# Patient Record
Sex: MALE | Race: BLACK OR AFRICAN AMERICAN
[De-identification: names, ages, dates, MRNs, and addresses within clinical notes are randomized per-mention and may not be internally consistent; named-entity substitution may affect disease eponyms.]

---

## 2017-01-01 ENCOUNTER — HOSPITAL ENCOUNTER (EMERGENCY)
Dept: HOSPITAL 61 - ER | Age: 37
Discharge: HOME | End: 2017-01-01
Payer: COMMERCIAL

## 2017-01-01 VITALS — WEIGHT: 315 LBS | HEIGHT: 74 IN | BODY MASS INDEX: 40.43 KG/M2

## 2017-01-01 VITALS — SYSTOLIC BLOOD PRESSURE: 142 MMHG | DIASTOLIC BLOOD PRESSURE: 85 MMHG

## 2017-01-01 DIAGNOSIS — R09.89: ICD-10-CM

## 2017-01-01 DIAGNOSIS — E11.65: Primary | ICD-10-CM

## 2017-01-01 DIAGNOSIS — Z79.4: ICD-10-CM

## 2017-01-01 DIAGNOSIS — E11.40: ICD-10-CM

## 2017-01-01 DIAGNOSIS — Z91.048: ICD-10-CM

## 2017-01-01 DIAGNOSIS — E66.01: ICD-10-CM

## 2017-01-01 DIAGNOSIS — I10: ICD-10-CM

## 2017-01-01 DIAGNOSIS — Z88.2: ICD-10-CM

## 2017-01-01 DIAGNOSIS — F12.10: ICD-10-CM

## 2017-01-01 LAB
ALBUMIN SERPL-MCNC: 3.4 G/DL (ref 3.4–5)
ALBUMIN/GLOB SERPL: 0.8 {RATIO} (ref 1–1.7)
ALP SERPL-CCNC: 87 U/L (ref 46–116)
ALT SERPL-CCNC: 21 U/L (ref 16–63)
ANION GAP SERPL CALC-SCNC: 7 MMOL/L (ref 6–14)
AST SERPL-CCNC: 8 U/L (ref 15–37)
BACTERIA #/AREA URNS HPF: (no result) /HPF
BASOPHILS # BLD AUTO: 0.1 X10^3/UL (ref 0–0.2)
BASOPHILS NFR BLD: 1 % (ref 0–3)
BILIRUB SERPL-MCNC: 0.2 MG/DL (ref 0.2–1)
BILIRUB UR QL STRIP: NEGATIVE
BUN SERPL-MCNC: 17 MG/DL (ref 8–26)
BUN/CREAT SERPL: 15 (ref 6–20)
CALCIUM SERPL-MCNC: 8.8 MG/DL (ref 8.5–10.1)
CHLORIDE SERPL-SCNC: 99 MMOL/L (ref 98–107)
CO2 SERPL-SCNC: 29 MMOL/L (ref 21–32)
CREAT SERPL-MCNC: 1.1 MG/DL (ref 0.7–1.3)
EOSINOPHIL NFR BLD: 3 % (ref 0–3)
ERYTHROCYTE [DISTWIDTH] IN BLOOD BY AUTOMATED COUNT: 14.2 % (ref 11.5–14.5)
GFR SERPLBLD BASED ON 1.73 SQ M-ARVRAT: 90.6 ML/MIN
GLOBULIN SER-MCNC: 4.5 G/DL (ref 2.2–3.8)
GLUCOSE SERPL-MCNC: 338 MG/DL (ref 70–99)
GLUCOSE UR STRIP-MCNC: >=1000 MG/DL
HCT VFR BLD CALC: 41.1 % (ref 39–53)
HGB BLD-MCNC: 13.1 G/DL (ref 13–17.5)
LYMPHOCYTES # BLD: 2 X10^3/UL (ref 1–4.8)
LYMPHOCYTES NFR BLD AUTO: 20 % (ref 24–48)
MCH RBC QN AUTO: 25 PG (ref 25–35)
MCHC RBC AUTO-ENTMCNC: 32 G/DL (ref 31–37)
MCV RBC AUTO: 77 FL (ref 79–100)
MONOCYTES NFR BLD: 8 % (ref 0–9)
NEUTROPHILS NFR BLD AUTO: 68 % (ref 31–73)
NITRITE UR QL STRIP: NEGATIVE
PH UR STRIP: 5.5 [PH]
PLATELET # BLD AUTO: 295 X10^3/UL (ref 140–400)
POTASSIUM SERPL-SCNC: 4 MMOL/L (ref 3.5–5.1)
PROT SERPL-MCNC: 7.9 G/DL (ref 6.4–8.2)
PROT UR STRIP-MCNC: NEGATIVE MG/DL
RBC # BLD AUTO: 5.31 X10^6/UL (ref 4.3–5.7)
RBC #/AREA URNS HPF: 0 /HPF (ref 0–2)
SODIUM SERPL-SCNC: 135 MMOL/L (ref 136–145)
SP GR UR STRIP: 1.02
SQUAMOUS #/AREA URNS LPF: (no result) /LPF
UROBILINOGEN UR-MCNC: 0.2 MG/DL
WBC # BLD AUTO: 10 X10^3/UL (ref 4–11)
WBC #/AREA URNS HPF: (no result) /HPF (ref 0–4)

## 2017-01-01 PROCEDURE — 82947 ASSAY GLUCOSE BLOOD QUANT: CPT

## 2017-01-01 PROCEDURE — 71020: CPT

## 2017-01-01 PROCEDURE — 80053 COMPREHEN METABOLIC PANEL: CPT

## 2017-01-01 PROCEDURE — 96360 HYDRATION IV INFUSION INIT: CPT

## 2017-01-01 PROCEDURE — 36415 COLL VENOUS BLD VENIPUNCTURE: CPT

## 2017-01-01 PROCEDURE — 81001 URINALYSIS AUTO W/SCOPE: CPT

## 2017-01-01 PROCEDURE — 96361 HYDRATE IV INFUSION ADD-ON: CPT

## 2017-01-01 PROCEDURE — 85027 COMPLETE CBC AUTOMATED: CPT

## 2017-01-01 PROCEDURE — 99285 EMERGENCY DEPT VISIT HI MDM: CPT

## 2017-01-01 NOTE — RAD
Indication: Hyperglycemia and short of air today. Hypertension.



Technique: Two-view chest radiograph was obtained.  Comparison is from October 2, 2012.



Findings: The lungs are clear.  The cardiopulmonary silhouette is within

normal limits.  There is no pleural effusion.  The bony structures are intact.

Leads overlie the patient.



Impression: 

No acute thoracic findings.

## 2017-01-01 NOTE — PHYS DOC
Past Medical History


Past Medical History:  Diabetes-Type II, Hypertension


Additional Past Medical Histor:  Morbid obesity, neuropathy


Past Surgical History:  No Surgical History


Alcohol Use:  Occasionally


Drug Use:  Marijuana





Adult General


Chief Complaint


Chief Complaint:  HYPERGLYCEMIA





HPI


HPI


Patient is a 36  year old female who presents with hyperglycemia. He reports a 

past 3 days his blood sugars been reading high on his home glucometer. He also 

reports polyuria, polydipsia, increased fatigue. He says he feels dehydrated. 

He has been taking his insulin as prescribed; he says he takes 30 units novolog 

3 times a day. Diet has been typical with no significant change. Only other 

acute complaint is runny nose.





Review of Systems


Review of Systems


Constitutional: Fatigue. Denies fever or chills 


Eyes: Denies change in visual acuity or eye pain 


HENT: Rhinorrhea. Denies sore throat 


Respiratory: Denies cough or shortness of breath 


Cardiovascular: Denies chest pain


GI: Denies abdominal pain, nausea, vomiting, bloody stools or diarrhea 


: Denies dysuria or hematuria 


Musculoskeletal: Denies back pain or joint pain 


Endocrine: Polyuria, polydipsia


Neurologic: Denies headache, focal weakness or sensory changes





Current Medications


Current Medications





 Current Medications








 Medications


  (Trade)  Dose


 Ordered  Sig/Pratik  Start Time


 Stop Time Status Last Admin


Dose Admin


 


 Lactated Ringer's


  (Iv Lactated


 Ringers)  1,000 ml @ 


 1,000 mls/hr  1X  ONCE  1/1/17 13:45


 1/1/17 14:44 DC 1/1/17 14:19


1,000 MLS/HR


 


 Sodium Chloride  1,000 ml @ 


 1,000 mls/hr  Q1H  1/1/17 11:46


 1/1/17 12:45 DC 1/1/17 12:39


1,000 MLS/HR











Allergies


Allergies





 Allergies








Coded Allergies Type Severity Reaction Last Updated Verified


 


  Sulfa (Sulfonamide Antibiotics) Allergy Intermediate  3/30/16 Yes


 


  adhesive tape Allergy Intermediate skin sensitive to tape 3/30/16 Yes











Physical Exam


Physical Exam


Constitutional: Well developed, well nourished, no acute distress, non-toxic 

appearance 


HENT: Normocephalic, atraumatic, bilateral external ears normal


Eyes: PERRL, EOMI, conjunctiva normal, no discharge


Neck: Normal range of motion, no stridor


Cardiovascular: Heart rate normal, regular rhythm, no murmur 


Lungs & Thorax:  Bilateral breath sounds clear to auscultation


Abdomen: Obese, bowel sounds normal, soft, non-distended, no TTP


Skin: Warm, dry, no erythema, no rash


Extremities: No obvious deformity, no edema


Neurologic: Somnolent but easily aroused, oriented X 3, no gross deficits noted





Current Patient Data


Vital Signs





 Vital Signs








  Date Time  Temp Pulse Resp B/P Pulse Ox O2 Delivery O2 Flow Rate FiO2


 


1/1/17 15:53  89 28 142/85 98 Room Air  


 


1/1/17 11:10 97.6       





 97.6       








Lab Values





 Laboratory Tests








Test


  1/1/17


11:25 1/1/17


11:37 1/1/17


13:39 1/1/17


14:20


 


White Blood Count


  10.0x10^3/uL


(4.0-11.0) 


  


  


 


 


Red Blood Count


  5.31x10^6/uL


(4.30-5.70) 


  


  


 


 


Hemoglobin


  13.1g/dL


(13.0-17.5) 


  


  


 


 


Hematocrit


  41.1%


(39.0-53.0) 


  


  


 


 


Mean Corpuscular Volume


  77fL ()


L 


  


  


 


 


Mean Corpuscular Hemoglobin 25pg (25-35)     


 


Mean Corpuscular Hemoglobin


Concent 32g/dL (31-37)


  


  


  


 


 


Red Cell Distribution Width


  14.2%


(11.5-14.5) 


  


  


 


 


Platelet Count


  295x10^3/uL


(140-400) 


  


  


 


 


Neutrophils (%) (Auto) 68% (31-73)     


 


Lymphocytes (%) (Auto) 20% (24-48)  L   


 


Monocytes (%) (Auto) 8% (0-9)     


 


Eosinophils (%) (Auto) 3% (0-3)     


 


Basophils (%) (Auto) 1% (0-3)     


 


Neutrophils # (Auto)


  6.8x10^3uL


(1.8-7.7) 


  


  


 


 


Lymphocytes # (Auto)


  2.0x10^3/uL


(1.0-4.8) 


  


  


 


 


Monocytes # (Auto)


  0.8x10^3/uL


(0.0-1.1) 


  


  


 


 


Eosinophils # (Auto)


  0.3x10^3/uL


(0.0-0.7) 


  


  


 


 


Basophils # (Auto)


  0.1x10^3/uL


(0.0-0.2) 


  


  


 


 


Sodium Level


  135mmol/L


(136-145)  L 


  


  


 


 


Potassium Level


  4.0mmol/L


(3.5-5.1) 


  


  


 


 


Chloride Level


  99mmol/L


() 


  


  


 


 


Carbon Dioxide Level


  29mmol/L


(21-32) 


  


  


 


 


Anion Gap 7 (6-14)     


 


Blood Urea Nitrogen


  17mg/dL (8-26)


  


  


  


 


 


Creatinine


  1.1mg/dL


(0.7-1.3) 


  


  


 


 


Estimated GFR


(Cockcroft-Gault) 90.6  


  


  


  


 


 


BUN/Creatinine Ratio 15 (6-20)     


 


Glucose Level


  338mg/dL


(70-99)  H 


  


  


 


 


Calcium Level


  8.8mg/dL


(8.5-10.1) 


  


  


 


 


Total Bilirubin


  0.2mg/dL


(0.2-1.0) 


  


  


 


 


Aspartate Amino Transferase


(AST) 8U/L (15-37)  L


  


  


  


 


 


Alanine Aminotransferase (ALT) 21U/L (16-63)     


 


Alkaline Phosphatase


  87U/L ()


  


  


  


 


 


Total Protein


  7.9g/dL


(6.4-8.2) 


  


  


 


 


Albumin


  3.4g/dL


(3.4-5.0) 


  


  


 


 


Albumin/Globulin Ratio


  0.8 (1.0-1.7)


L 


  


  


 


 


Glucose (Fingerstick)


  


  339mg/dL


(70-99)  H 154mg/dL


(70-99)  H 


 


 


Urine Collection Type    Unknown  


 


Urine Color    Yellow  


 


Urine Clarity    Clear  


 


Urine pH    5.5  


 


Urine Specific Gravity    1.025  


 


Urine Protein


  


  


  


  Negativemg/dL


(NEG-TRACE)


 


Urine Glucose (UA)


  


  


  


  >=1000mg/dL


(NEG)


 


Urine Ketones (Stick)


  


  


  


  Negativemg/dL


(NEG)


 


Urine Blood


  


  


  


  Negative (NEG)


 


 


Urine Nitrite


  


  


  


  Negative (NEG)


 


 


Urine Bilirubin


  


  


  


  Negative (NEG)


 


 


Urine Urobilinogen Dipstick


  


  


  


  0.2mg/dL (0.2


mg/dL)


 


Urine Leukocyte Esterase


  


  


  


  Negative (NEG)


 


 


Urine RBC    0/HPF (0-2)  


 


Urine WBC    1-4/HPF (0-4)  


 


Urine Squamous Epithelial


Cells 


  


  


  Few/LPF  


 


 


Urine Bacteria


  


  


  


  Few/HPF


(0-FEW)


 


Urine Mucus    Slight/LPF  














Test


  1/1/17


15:45 


  


  


 


 


Glucose (Fingerstick)


  112mg/dL


(70-99)  H 


  


  


 





 Laboratory Tests


1/1/17 11:25








 Laboratory Tests


1/1/17 11:25














EKG


EKG


[]





Radiology/Procedures


Radiology/Procedures


CXR:


Impression: 


No acute thoracic findings.





Course & Med Decision Making


Course & Med Decision Making


Pertinent Labs and Imaging studies reviewed. (See chart for details)


Patient is 36-year-old male who presents with hyperglycemia. Had been given 50 

units of NovoLog better prior to coming in, blood glucose level down to 300s on 

arrival to the ED. Will check labs to rule out serious complication such as 

DKA. IV fluid bolus ordered. Labs notable for for blood glucose level greater 

than 300. After fluid bolus, this had gone down to 154. Additional fluids 

ordered. Patient in ED to monitor blood sugar make sure that it did not drop to 

dangerous levels. Repeat fingerstick after several hours in ED was 112. 

Discussed results with patient. Discussed importance of following up with PCP 

for possible adjustment of diabetes meds; also discussed return precautions. 

Patient discharged home.





Dragon Disclaimer


Dragon Disclaimer


This electronic medical record was generated, in whole or in part, using a 

voice recognition dictation system.





Departure


Departure


Impression:  


 Primary Impression:  


 Hyperglycemia


Disposition:  01 HOME, SELF-CARE


Condition:  IMPROVED


Referrals:  


JAROD PATINO MD (PCP)


Patient Instructions:  Hyperglycemia





Additional Instructions:


Thank you for allowing us to provide care today in the Emergency Department.


Schedule a follow up appointment with your primary care doctor. You may need 

your diabetes medications adjusted.


Return promptly to the Emergency Department if you develop any new or 

concerning symptoms.








ROSE MARIE HARMON MD Jan 1, 2017 11:49

## 2017-05-13 ENCOUNTER — HOSPITAL ENCOUNTER (EMERGENCY)
Dept: HOSPITAL 61 - ER | Age: 37
Discharge: HOME | End: 2017-05-13
Payer: COMMERCIAL

## 2017-05-13 VITALS — SYSTOLIC BLOOD PRESSURE: 166 MMHG | DIASTOLIC BLOOD PRESSURE: 87 MMHG

## 2017-05-13 DIAGNOSIS — E66.9: ICD-10-CM

## 2017-05-13 DIAGNOSIS — F12.10: ICD-10-CM

## 2017-05-13 DIAGNOSIS — E11.40: ICD-10-CM

## 2017-05-13 DIAGNOSIS — J18.9: Primary | ICD-10-CM

## 2017-05-13 DIAGNOSIS — Z88.8: ICD-10-CM

## 2017-05-13 DIAGNOSIS — Z88.2: ICD-10-CM

## 2017-05-13 DIAGNOSIS — J45.909: ICD-10-CM

## 2017-05-13 DIAGNOSIS — I10: ICD-10-CM

## 2017-05-13 PROCEDURE — 99284 EMERGENCY DEPT VISIT MOD MDM: CPT

## 2017-05-13 PROCEDURE — 96372 THER/PROPH/DIAG INJ SC/IM: CPT

## 2017-05-13 PROCEDURE — 71020: CPT

## 2017-05-13 NOTE — PHYS DOC
Past Medical History


Past Medical History:  Diabetes-Type II, Hypertension


Additional Past Medical Histor:  Morbid obesity, neuropathy


Past Surgical History:  No Surgical History


Alcohol Use:  Occasionally


Drug Use:  Marijuana





Adult General


Chief Complaint


Chief Complaint:  ASTHMA





HPI


HPI





Patient is a 36  year old gentleman who presents with a history of productive 

cough for the past 4-5 days with shortness of air. Patient has a history of 

asthma and has been doing breathing treatments at home. Patient denies smoking. 

Patient denies any fevers. Patient denies any chest pain. Patient is diabetic 

and severe obese. Patient has no other complaints. Patient denies any cardiac 

history or any history of PE.





Pertinent exam findings:


Tachycardia


Lungs: CTAB





ED course:


1627: CXR ordered


1800: Patient reevaluated who is feeling much better and is ready to go home. 

Explained chest x-ray results the patient may need placement on antibiotics and 

steroids. Patient states he'll follow up was recently in 1-2 days





Pertinent findings:


Two-view chest x-ray shows left lower lobe infiltrates





ED decision-making:


After reviewing the chart, CC/HPI/PMH, PE, chest x-ray results I do not believe 

the patient is having a severe respiratory infection warranting further workup 

and admission at this time. Low suspicion for an acute PE with the well's score 

of 1.5.  On re-examination patient states he feels better and desired to go 

home. I believe the patient is stable for discharge with oral antibiotics.  

Additional verbal discharge instructions were provided to the patient and that 

if symptoms get worse or any new symptoms arise or worsen the patient is to 

returned emergency room immediately.





Review of Systems


Review of Systems





Constitutional: Denies fever or chills []


Eyes: Denies change in visual acuity, redness, or eye pain []


HENT: Denies nasal congestion or sore throat []


Respiratory: SOA, cough


Cardiovascular: No additional information not addressed in HPI []


GI: Denies abdominal pain, nausea, vomiting, bloody stools or diarrhea []


: Denies dysuria or hematuria []


Musculoskeletal: Denies back pain or joint pain []


Integument: Denies rash or skin lesions []


Neurologic: Denies headache, focal weakness or sensory changes []


Endocrine: Denies polyuria or polydipsia []





Current Medications


Current Medications





Current Medications








 Medications


  (Trade)  Dose


 Ordered  Sig/Pratik  Start Time


 Stop Time Status Last Admin


Dose Admin


 


 Dexamethasone


 Sodium Phosphate


  (Decadron)  10 mg  1X  ONCE  5/13/17 16:30


 5/13/17 16:31 DC 5/13/17 16:30


10 MG











Allergies


Allergies





Allergies








Coded Allergies Type Severity Reaction Last Updated Verified


 


  Sulfa (Sulfonamide Antibiotics) Allergy Intermediate  3/30/16 Yes


 


  adhesive tape Allergy Intermediate skin sensitive to tape 3/30/16 Yes











Physical Exam


Physical Exam





Constitutional: Well developed, well nourished, no acute distress, non-toxic 

appearance. []


HENT: Normocephalic, atraumatic, bilateral external ears normal, oropharynx 

moist, no oral exudates, nose normal. []


Eyes: PERRLA, EOMI, conjunctiva normal, no discharge. [] 


Neck: Normal range of motion, no tenderness, supple, no stridor. [] 


Cardiovascular:Tachy, regular rhythm, no murmur []


Lungs & Thorax:  Bilateral breath sounds clear to auscultation []


Abdomen: Bowel sounds normal, soft, no tenderness, no masses, no pulsatile 

masses. [] 


Skin: Warm, dry, no erythema, no rash. [] 


Back: No tenderness, no CVA tenderness. [] 


Extremities: No tenderness, no cyanosis, no clubbing, ROM intact, no edema. [] 


Neurologic: Alert and oriented X 3, normal motor function, normal sensory 

function, no focal deficits noted. []


Psychologic: Affect normal, judgement normal, mood normal. []





Current Patient Data


Vital Signs





 Vital Signs








  Date Time  Temp Pulse Resp B/P (MAP) Pulse Ox O2 Delivery O2 Flow Rate FiO2


 


5/13/17 16:27 98.7 99 20  95 Room Air  





 98.7       











EKG


EKG


[]





Radiology/Procedures


Radiology/Procedures


Two-view chest x-ray 


Infiltrates left lower lobe []





Course & Med Decision Making


Course & Med Decision Making


Pertinent Labs and Imaging studies reviewed. (See chart for details)





[]





Dragon Disclaimer


Dragon Disclaimer


This electronic medical record was generated, in whole or in part, using a 

voice recognition dictation system.





Departure


Departure


Impression:  


 Primary Impression:  


 Left lower lobe pneumonia


Disposition:  01 HOME, SELF-CARE


Condition:  IMPROVED


Referrals:  


UNKNOWN PCP NAME (PCP)


Patient Instructions:  Pneumonia, Adult


Scripts


Azithromycin (ZITHROMAX PACKET) 1 Gm Packet


1 PACKET PO ONCE, #1 PACKET


   Prov: BINU CHO DO         5/13/17 


Prednisone (PREDNISONE) 50 Mg Tablet


1 TAB PO DAILY, #5 TAB


   Prov: BINU CHO DO         5/13/17





Problem Qualifiers








 Primary Impression:  


 Left lower lobe pneumonia


 Pneumonia type:  due to unspecified organism  Qualified Codes:  J18.1 - Lobar 

pneumonia, unspecified organism








BINU CHO DO May 13, 2017 16:29

## 2017-05-14 NOTE — RAD
EXAM: CHEST 2 VIEWS 



History: Shortness of breath



COMPARISON: 1/1/2017



TECHNIQUE: PA and lateral chest radiographs



FINDINGS:  The cardiomediastinal silhouette is within normal limits. The lungs

are clear bilaterally. The costophrenic sulci are clear and well demarcated

bilaterally. 



IMPRESSION:  No radiographic evidence of an acute cardiopulmonary abnormality.

## 2018-06-05 ENCOUNTER — HOSPITAL ENCOUNTER (EMERGENCY)
Dept: HOSPITAL 61 - ER | Age: 38
LOS: 1 days | Discharge: ANOTHER HOSPITAL | End: 2018-06-06
Payer: COMMERCIAL
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
## 2018-06-05 DIAGNOSIS — J45.909: ICD-10-CM

## 2018-06-05 DIAGNOSIS — R07.89: ICD-10-CM

## 2018-06-05 DIAGNOSIS — Z88.8: ICD-10-CM

## 2018-06-05 DIAGNOSIS — Z88.2: ICD-10-CM

## 2018-06-05 DIAGNOSIS — E66.01: ICD-10-CM

## 2018-06-05 DIAGNOSIS — E11.40: ICD-10-CM
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
## 2018-06-05 DIAGNOSIS — M79.671: ICD-10-CM

## 2018-06-05 DIAGNOSIS — I10: ICD-10-CM

## 2018-06-05 DIAGNOSIS — K80.20: Primary | ICD-10-CM

## 2018-06-05 LAB
ADD MAN DIFF?: NO
ALBUMIN SERPL-MCNC: 3.3 G/DL (ref 3.4–5)
ALBUMIN/GLOB SERPL: 0.8 {RATIO} (ref 1–1.7)
ALP SERPL-CCNC: 97 U/L (ref 46–116)
ALT (SGPT): 24 U/L (ref 16–63)
ANION GAP SERPL CALC-SCNC: 10 MMOL/L (ref 6–14)
AST SERPL-CCNC: 10 U/L (ref 15–37)
BASO #: 0.1 X10^3/UL (ref 0–0.2)
BASO %: 1 % (ref 0–3)
BLOOD UREA NITROGEN: 13 MG/DL (ref 8–26)
BUN/CREAT SERPL: 13 (ref 6–20)
CALCIUM: 8.2 MG/DL (ref 8.5–10.1)
CHLORIDE: 100 MMOL/L (ref 98–107)
CK SERPL-CCNC: 87 U/L (ref 39–308)
CK SERPL-CCNC: 92 U/L (ref 39–308)
CKMB INDEX: 1 % (ref 0–4)
CKMB MASS: 0.9 NG/ML (ref 0–3.6)
CO2 SERPL-SCNC: 27 MMOL/L (ref 21–32)
CREAT SERPL-MCNC: 1 MG/DL (ref 0.7–1.3)
EOS #: 0.4 X10^3/UL (ref 0–0.7)
EOS %: 3 % (ref 0–3)
GFR SERPLBLD BASED ON 1.73 SQ M-ARVRAT: 100.7 ML/MIN
GLOBULIN SER-MCNC: 4.3 G/DL (ref 2.2–3.8)
GLUCOSE SERPL-MCNC: 305 MG/DL (ref 70–99)
HCG SERPL-ACNC: 12.1 X10^3/UL (ref 4–11)
HEMATOCRIT: 41.8 % (ref 39–53)
HEMOGLOBIN: 13.8 G/DL (ref 13–17.5)
INR: 1 (ref 0.8–1.1)
LIPASE: 43 U/L (ref 73–393)
LYMPH #: 2.1 X10^3/UL (ref 1–4.8)
LYMPH %: 17 % (ref 24–48)
MEAN CORPUSCULAR HEMOGLOBIN: 26 PG (ref 25–35)
MEAN CORPUSCULAR HGB CONC: 33 G/DL (ref 31–37)
MEAN CORPUSCULAR VOLUME: 78 FL (ref 79–100)
MONO #: 1 X10^3/UL (ref 0–1.1)
MONO %: 8 % (ref 0–9)
NEUT #: 8.6 X10^3UL (ref 1.8–7.7)
NEUT %: 71 % (ref 31–73)
PLATELET COUNT: 339 X10^3/UL (ref 140–400)
POTASSIUM SERPL-SCNC: 4.2 MMOL/L (ref 3.5–5.1)
PROTHROMBIN TIME PATIENT: 12.3 SEC (ref 11.7–14)
RED BLOOD COUNT: 5.34 X10^6/UL (ref 4.3–5.7)
RED CELL DISTRIBUTION WIDTH: 15.2 % (ref 11.5–14.5)
SODIUM: 137 MMOL/L (ref 136–145)
TOTAL BILIRUBIN: 0.4 MG/DL (ref 0.2–1)
TOTAL PROTEIN: 7.6 G/DL (ref 6.4–8.2)
TROPONINI: < 0.017 NG/ML (ref 0–0.06)

## 2018-06-05 PROCEDURE — 83690 ASSAY OF LIPASE: CPT

## 2018-06-05 PROCEDURE — 71260 CT THORAX DX C+: CPT

## 2018-06-05 PROCEDURE — 73630 X-RAY EXAM OF FOOT: CPT

## 2018-06-05 PROCEDURE — 93005 ELECTROCARDIOGRAM TRACING: CPT

## 2018-06-05 PROCEDURE — 85610 PROTHROMBIN TIME: CPT

## 2018-06-05 PROCEDURE — 85025 COMPLETE CBC W/AUTO DIFF WBC: CPT

## 2018-06-05 PROCEDURE — 80053 COMPREHEN METABOLIC PANEL: CPT

## 2018-06-05 PROCEDURE — 36415 COLL VENOUS BLD VENIPUNCTURE: CPT

## 2018-06-05 PROCEDURE — 71045 X-RAY EXAM CHEST 1 VIEW: CPT

## 2018-06-05 PROCEDURE — 82553 CREATINE MB FRACTION: CPT

## 2018-06-05 PROCEDURE — 74177 CT ABD & PELVIS W/CONTRAST: CPT

## 2018-06-05 PROCEDURE — 96375 TX/PRO/DX INJ NEW DRUG ADDON: CPT

## 2018-06-05 PROCEDURE — 84484 ASSAY OF TROPONIN QUANT: CPT

## 2018-06-05 PROCEDURE — 96365 THER/PROPH/DIAG IV INF INIT: CPT

## 2018-06-05 PROCEDURE — 82550 ASSAY OF CK (CPK): CPT

## 2018-06-05 PROCEDURE — 99285 EMERGENCY DEPT VISIT HI MDM: CPT

## 2018-06-05 PROCEDURE — 96368 THER/DIAG CONCURRENT INF: CPT

## 2018-06-05 RX ADMIN — ONDANSETRON 1 MG: 2 INJECTION INTRAMUSCULAR; INTRAVENOUS at 22:28

## 2018-06-05 RX ADMIN — FENTANYL CITRATE 1 MCG: 50 INJECTION INTRAMUSCULAR; INTRAVENOUS at 22:27

## 2018-06-05 RX ADMIN — IOHEXOL 1 ML: 300 INJECTION, SOLUTION INTRAVENOUS at 22:30

## 2018-06-06 RX ADMIN — BACITRACIN 1 MLS/HR: 5000 INJECTION, POWDER, FOR SOLUTION INTRAMUSCULAR at 00:52

## 2018-06-06 RX ADMIN — MORPHINE SULFATE 1 MG: 4 INJECTION, SOLUTION INTRAMUSCULAR; INTRAVENOUS at 00:51

## 2018-07-02 ENCOUNTER — HOSPITAL ENCOUNTER (OUTPATIENT)
Dept: HOSPITAL 61 - SLPLAB | Age: 38
Discharge: HOME | End: 2018-07-02
Attending: NURSE PRACTITIONER
Payer: COMMERCIAL

## 2018-07-02 DIAGNOSIS — I10: ICD-10-CM

## 2018-07-02 DIAGNOSIS — G47.61: ICD-10-CM

## 2018-07-02 DIAGNOSIS — E78.00: ICD-10-CM

## 2018-07-02 DIAGNOSIS — E11.65: ICD-10-CM

## 2018-07-02 DIAGNOSIS — G47.33: Primary | ICD-10-CM

## 2018-07-02 PROCEDURE — 95810 POLYSOM 6/> YRS 4/> PARAM: CPT

## 2018-08-01 ENCOUNTER — HOSPITAL ENCOUNTER (EMERGENCY)
Dept: HOSPITAL 61 - ER | Age: 38
Discharge: HOME | End: 2018-08-01
Payer: COMMERCIAL

## 2018-08-01 DIAGNOSIS — E11.40: ICD-10-CM

## 2018-08-01 DIAGNOSIS — E78.00: ICD-10-CM

## 2018-08-01 DIAGNOSIS — M79.605: ICD-10-CM

## 2018-08-01 DIAGNOSIS — J45.909: ICD-10-CM

## 2018-08-01 DIAGNOSIS — M54.5: Primary | ICD-10-CM

## 2018-08-01 LAB
AMORPHOUS SEDIMENT,UR: PRESENT /HPF
BACTERIA,URINE: (no result) /HPF
BILIRUBIN,URINE: NEGATIVE
CLARITY,URINE: CLEAR
COLOR,URINE: YELLOW
GLUCOSE,URINE: 100 MG/DL
NITRITE,URINE: NEGATIVE
PH,URINE: 5.5
PROTEIN,URINE: 30 MG/DL
RBC,URINE: 0 /HPF (ref 0–2)
SPECIFIC GRAVITY,URINE: >=1.03
SQUAMOUS EPITHELIAL CELL,UR: (no result) /LPF
UROBILINOGEN,URINE: 1 MG/DL

## 2018-08-01 PROCEDURE — 99284 EMERGENCY DEPT VISIT MOD MDM: CPT

## 2018-08-01 PROCEDURE — 87086 URINE CULTURE/COLONY COUNT: CPT

## 2018-08-01 PROCEDURE — 81001 URINALYSIS AUTO W/SCOPE: CPT

## 2018-08-01 PROCEDURE — 96372 THER/PROPH/DIAG INJ SC/IM: CPT

## 2018-08-01 RX ADMIN — KETOROLAC TROMETHAMINE 1 MG: 30 INJECTION, SOLUTION INTRAMUSCULAR at 03:54

## 2018-08-01 RX ADMIN — LIDOCAINE 1 PATCH: 50 PATCH CUTANEOUS at 03:53

## 2018-08-01 RX ADMIN — ORPHENADRINE CITRATE 1 MG: 60 INJECTION INTRAMUSCULAR; INTRAVENOUS at 03:53

## 2018-08-01 RX ADMIN — HYDROCODONE BITARTRATE AND ACETAMINOPHEN 1 TAB: 5; 325 TABLET ORAL at 03:53

## 2018-08-01 RX ADMIN — CEPHALEXIN 1 MG: 250 CAPSULE ORAL at 03:54

## 2019-03-20 ENCOUNTER — HOSPITAL ENCOUNTER (INPATIENT)
Dept: HOSPITAL 61 - ER | Age: 39
LOS: 2 days | Discharge: HOME | DRG: 871 | End: 2019-03-22
Attending: INTERNAL MEDICINE | Admitting: INTERNAL MEDICINE
Payer: COMMERCIAL

## 2019-03-20 VITALS — BODY MASS INDEX: 40.43 KG/M2 | HEIGHT: 74 IN | WEIGHT: 315 LBS

## 2019-03-20 VITALS — SYSTOLIC BLOOD PRESSURE: 128 MMHG | DIASTOLIC BLOOD PRESSURE: 83 MMHG

## 2019-03-20 VITALS — DIASTOLIC BLOOD PRESSURE: 78 MMHG | SYSTOLIC BLOOD PRESSURE: 132 MMHG

## 2019-03-20 VITALS — SYSTOLIC BLOOD PRESSURE: 140 MMHG | DIASTOLIC BLOOD PRESSURE: 78 MMHG

## 2019-03-20 VITALS — DIASTOLIC BLOOD PRESSURE: 58 MMHG | SYSTOLIC BLOOD PRESSURE: 106 MMHG

## 2019-03-20 VITALS — DIASTOLIC BLOOD PRESSURE: 64 MMHG | SYSTOLIC BLOOD PRESSURE: 103 MMHG

## 2019-03-20 VITALS — DIASTOLIC BLOOD PRESSURE: 70 MMHG | SYSTOLIC BLOOD PRESSURE: 144 MMHG

## 2019-03-20 DIAGNOSIS — E11.649: ICD-10-CM

## 2019-03-20 DIAGNOSIS — J45.901: ICD-10-CM

## 2019-03-20 DIAGNOSIS — E11.40: ICD-10-CM

## 2019-03-20 DIAGNOSIS — A41.9: Primary | ICD-10-CM

## 2019-03-20 DIAGNOSIS — Z82.49: ICD-10-CM

## 2019-03-20 DIAGNOSIS — E66.2: ICD-10-CM

## 2019-03-20 DIAGNOSIS — Z87.891: ICD-10-CM

## 2019-03-20 DIAGNOSIS — Z83.3: ICD-10-CM

## 2019-03-20 DIAGNOSIS — Z88.2: ICD-10-CM

## 2019-03-20 DIAGNOSIS — I10: ICD-10-CM

## 2019-03-20 DIAGNOSIS — J20.9: ICD-10-CM

## 2019-03-20 DIAGNOSIS — E78.00: ICD-10-CM

## 2019-03-20 DIAGNOSIS — J96.01: ICD-10-CM

## 2019-03-20 DIAGNOSIS — Z91.048: ICD-10-CM

## 2019-03-20 LAB
ALBUMIN SERPL-MCNC: 3.2 G/DL (ref 3.4–5)
ALBUMIN/GLOB SERPL: 0.7 {RATIO} (ref 1–1.7)
ALP SERPL-CCNC: 103 U/L (ref 46–116)
ALT SERPL-CCNC: 34 U/L (ref 16–63)
ANION GAP SERPL CALC-SCNC: 10 MMOL/L (ref 6–14)
AST SERPL-CCNC: 23 U/L (ref 15–37)
BASOPHILS # BLD AUTO: 0.1 X10^3/UL (ref 0–0.2)
BASOPHILS NFR BLD: 1 % (ref 0–3)
BILIRUB SERPL-MCNC: 0.2 MG/DL (ref 0.2–1)
BUN SERPL-MCNC: 20 MG/DL (ref 8–26)
BUN/CREAT SERPL: 22 (ref 6–20)
CALCIUM SERPL-MCNC: 8.6 MG/DL (ref 8.5–10.1)
CHLORIDE SERPL-SCNC: 102 MMOL/L (ref 98–107)
CO2 SERPL-SCNC: 28 MMOL/L (ref 21–32)
CREAT SERPL-MCNC: 0.9 MG/DL (ref 0.7–1.3)
EOSINOPHIL NFR BLD: 0 % (ref 0–3)
EOSINOPHIL NFR BLD: 0 X10^3/UL (ref 0–0.7)
ERYTHROCYTE [DISTWIDTH] IN BLOOD BY AUTOMATED COUNT: 15.6 % (ref 11.5–14.5)
GFR SERPLBLD BASED ON 1.73 SQ M-ARVRAT: 113.1 ML/MIN
GLOBULIN SER-MCNC: 4.5 G/DL (ref 2.2–3.8)
GLUCOSE SERPL-MCNC: 114 MG/DL (ref 70–99)
HCT VFR BLD CALC: 37.9 % (ref 39–53)
HGB BLD-MCNC: 12.1 G/DL (ref 13–17.5)
INFLUENZA A PATIENT: NEGATIVE
INFLUENZA B PATIENT: NEGATIVE
LYMPHOCYTES # BLD: 1 X10^3/UL (ref 1–4.8)
LYMPHOCYTES NFR BLD AUTO: 8 % (ref 24–48)
MCH RBC QN AUTO: 25 PG (ref 25–35)
MCHC RBC AUTO-ENTMCNC: 32 G/DL (ref 31–37)
MCV RBC AUTO: 78 FL (ref 79–100)
MONO #: 1.2 X10^3/UL (ref 0–1.1)
MONOCYTES NFR BLD: 10 % (ref 0–9)
NEUT #: 10.3 X10^3UL (ref 1.8–7.7)
NEUTROPHILS NFR BLD AUTO: 82 % (ref 31–73)
PLATELET # BLD AUTO: 391 X10^3/UL (ref 140–400)
POTASSIUM SERPL-SCNC: 4.5 MMOL/L (ref 3.5–5.1)
PROT SERPL-MCNC: 7.7 G/DL (ref 6.4–8.2)
RBC # BLD AUTO: 4.88 X10^6/UL (ref 4.3–5.7)
SODIUM SERPL-SCNC: 140 MMOL/L (ref 136–145)
WBC # BLD AUTO: 12.6 X10^3/UL (ref 4–11)

## 2019-03-20 PROCEDURE — 94760 N-INVAS EAR/PLS OXIMETRY 1: CPT

## 2019-03-20 PROCEDURE — 93970 EXTREMITY STUDY: CPT

## 2019-03-20 PROCEDURE — 96375 TX/PRO/DX INJ NEW DRUG ADDON: CPT

## 2019-03-20 PROCEDURE — 94660 CPAP INITIATION&MGMT: CPT

## 2019-03-20 PROCEDURE — 36415 COLL VENOUS BLD VENIPUNCTURE: CPT

## 2019-03-20 PROCEDURE — 80048 BASIC METABOLIC PNL TOTAL CA: CPT

## 2019-03-20 PROCEDURE — 93005 ELECTROCARDIOGRAM TRACING: CPT

## 2019-03-20 PROCEDURE — 94644 CONT INHLJ TX 1ST HOUR: CPT

## 2019-03-20 PROCEDURE — C8929 TTE W OR WO FOL WCON,DOPPLER: HCPCS

## 2019-03-20 PROCEDURE — 94640 AIRWAY INHALATION TREATMENT: CPT

## 2019-03-20 PROCEDURE — 84484 ASSAY OF TROPONIN QUANT: CPT

## 2019-03-20 PROCEDURE — 83880 ASSAY OF NATRIURETIC PEPTIDE: CPT

## 2019-03-20 PROCEDURE — 87040 BLOOD CULTURE FOR BACTERIA: CPT

## 2019-03-20 PROCEDURE — 80053 COMPREHEN METABOLIC PANEL: CPT

## 2019-03-20 PROCEDURE — 71045 X-RAY EXAM CHEST 1 VIEW: CPT

## 2019-03-20 PROCEDURE — 96374 THER/PROPH/DIAG INJ IV PUSH: CPT

## 2019-03-20 PROCEDURE — 83605 ASSAY OF LACTIC ACID: CPT

## 2019-03-20 PROCEDURE — 85025 COMPLETE CBC W/AUTO DIFF WBC: CPT

## 2019-03-20 PROCEDURE — 82962 GLUCOSE BLOOD TEST: CPT

## 2019-03-20 PROCEDURE — 87804 INFLUENZA ASSAY W/OPTIC: CPT

## 2019-03-20 PROCEDURE — 71275 CT ANGIOGRAPHY CHEST: CPT

## 2019-03-20 PROCEDURE — 5A09357 ASSISTANCE WITH RESPIRATORY VENTILATION, LESS THAN 24 CONSECUTIVE HOURS, CONTINUOUS POSITIVE AIRWAY PRESSURE: ICD-10-PCS | Performed by: INTERNAL MEDICINE

## 2019-03-20 RX ADMIN — INSULIN GLARGINE SCH UNITS: 100 INJECTION, SOLUTION SUBCUTANEOUS at 14:15

## 2019-03-20 RX ADMIN — IPRATROPIUM BROMIDE AND ALBUTEROL SULFATE SCH ML: .5; 3 SOLUTION RESPIRATORY (INHALATION) at 15:21

## 2019-03-20 RX ADMIN — BUDESONIDE SCH MG: 0.5 INHALANT RESPIRATORY (INHALATION) at 19:39

## 2019-03-20 RX ADMIN — INSULIN LISPRO SCH UNITS: 100 INJECTION, SOLUTION INTRAVENOUS; SUBCUTANEOUS at 12:39

## 2019-03-20 RX ADMIN — IPRATROPIUM BROMIDE AND ALBUTEROL SULFATE SCH ML: .5; 3 SOLUTION RESPIRATORY (INHALATION) at 07:53

## 2019-03-20 RX ADMIN — INSULIN LISPRO SCH UNITS: 100 INJECTION, SOLUTION INTRAVENOUS; SUBCUTANEOUS at 18:59

## 2019-03-20 RX ADMIN — IPRATROPIUM BROMIDE AND ALBUTEROL SULFATE SCH ML: .5; 3 SOLUTION RESPIRATORY (INHALATION) at 11:11

## 2019-03-20 RX ADMIN — AMITRIPTYLINE HYDROCHLORIDE SCH MG: 50 TABLET, FILM COATED ORAL at 21:27

## 2019-03-20 RX ADMIN — IPRATROPIUM BROMIDE AND ALBUTEROL SULFATE SCH ML: .5; 3 SOLUTION RESPIRATORY (INHALATION) at 19:39

## 2019-03-20 RX ADMIN — INSULIN GLARGINE SCH UNITS: 100 INJECTION, SOLUTION SUBCUTANEOUS at 21:30

## 2019-03-20 RX ADMIN — LISINOPRIL SCH MG: 20 TABLET ORAL at 12:34

## 2019-03-20 RX ADMIN — DOXYCYCLINE HYCLATE SCH MG: 100 TABLET, COATED ORAL at 18:55

## 2019-03-20 RX ADMIN — INSULIN LISPRO SCH UNITS: 100 INJECTION, SOLUTION INTRAVENOUS; SUBCUTANEOUS at 12:38

## 2019-03-20 NOTE — NUR
SW following for discharge planning. Discussed with RN, pt is from home with girlfriend. RN 
advised no SW needs at this time. SW will continue to follow.

## 2019-03-20 NOTE — NUR
Patient was placed on a bariatric bed at 1030. His blood glucose at 0700 was 398 then at 
1100 it went up to 459. MD informed and ordered were received. Patient remains asymptomatic 
at this time.

## 2019-03-20 NOTE — PHYS DOC
Past Medical History


Past Medical History:  Asthma, Diabetes-Type II, High Cholesterol, Hypertension


Additional Past Medical Histor:  Morbid obesity, neuropathy


Past Surgical History:  No Surgical History


Alcohol Use:  Occasionally


Drug Use:  Marijuana





Adult General


Chief Complaint


Chief Complaint:  SHORTNESS OF BREATH





HPI


HPI





Patient is a 38  year old patient has a history of asthma coughing up yellow 

sputum having fevers having chest pain with coughing and quite short of breath 

overall.


The chest pain is center of the chest feeling like pressure but also worse with 

coughing.





Review of Systems


Review of Systems





Constitutional: 


Eyes: Denies change in visual acuity, redness, or eye pain []


HENT: D





GI: Denies abdominal pain, nausea, vomiting, bloody stools or diarrhea []


: Denies dysuria or hematuria []


Musculoskeletal: Denies back pain or joint pain []





Neurologic: Denies headache, focal weakness or sensory changes []








All other systems were reviewed and found to be within normal limits, except as 

documented in this note.





Current Medications


Current Medications





Current Medications








 Medications


  (Trade)  Dose


 Ordered  Sig/Pratik  Start Time


 Stop Time Status Last Admin


Dose Admin


 


 Albuterol Sulfate


  (Ventolin Neb


 Soln)  10 mg  1X  ONCE  3/20/19 01:00


 3/20/19 01:01 DC 3/20/19 00:47


10 MG


 


 Methylprednisolone


 Sodium Succinate


  (SOLU-Medrol


 125MG VIAL)  125 mg  1X  ONCE  3/20/19 01:00


 3/20/19 01:01 DC 3/20/19 01:13


125 MG











Allergies


Allergies





Allergies








Coded Allergies Type Severity Reaction Last Updated Verified


 


  Sulfa (Sulfonamide Antibiotics) Allergy Intermediate  3/30/16 Yes


 


  adhesive tape Allergy Intermediate skin sensitive to tape 3/30/16 Yes











Physical Exam


Physical Exam





Constitutional: Well developed, well nourished, no acute distress, non-toxic 

appearance. []


HENT: Normocephalic, atraumatic, bilateral external ears normal, oropharynx 

moist, no oral exudates, nose normal. []


Eyes: PERRLA,  conjunctiva normal, no discharge. [] 


Neck: Normal range of motion, no tenderness, supple, no stridor. [] 


Cardiovascular: mild tachy no definite murmurs. 


Lungs & Thorax: wheezing noted b/l. pt has frequent reactive cough. 


Abdomen: Bowel sounds normal, soft, no tenderness, no masses, no pulsatile 

masses. [] 


Skin: Warm, dry, no erythema, no rash. [] 


Back: No tenderness, no CVA tenderness. [] 


Extremities: No tenderness, no cyanosis, no clubbing, ROM intact, no edema. [] 


Neurologic: Alert and oriented X 3, normal motor function, normal sensory 

function, no focal deficits noted. []





Current Patient Data


Vital Signs





 Vital Signs








  Date Time  Temp Pulse Resp B/P (MAP) Pulse Ox O2 Delivery O2 Flow Rate FiO2


 


3/20/19 00:46     96 Room Air  


 


3/20/19 00:28  116  143/65 (91)    


 


3/20/19 00:25 99.5  30     





 99.5       








Lab Values





 Laboratory Tests








Test


 3/20/19


00:56


 


White Blood Count


 12.6 x10^3/uL


(4.0-11.0)  H


 


Red Blood Count


 4.88 x10^6/uL


(4.30-5.70)


 


Hemoglobin


 12.1 g/dL


(13.0-17.5)  L


 


Hematocrit


 37.9 %


(39.0-53.0)  L


 


Mean Corpuscular Volume


 78 fL ()


L


 


Mean Corpuscular Hemoglobin 25 pg (25-35)  


 


Mean Corpuscular Hemoglobin


Concent 32 g/dL


(31-37)


 


Red Cell Distribution Width


 15.6 %


(11.5-14.5)  H


 


Platelet Count


 391 x10^3/uL


(140-400)


 


Neutrophils (%) (Auto) 82 % (31-73)  H


 


Lymphocytes (%) (Auto) 8 % (24-48)  L


 


Monocytes (%) (Auto) 10 % (0-9)  H


 


Eosinophils (%) (Auto) 0 % (0-3)  


 


Basophils (%) (Auto) 1 % (0-3)  


 


Neutrophils # (Auto)


 10.3 x10^3uL


(1.8-7.7)  H


 


Lymphocytes # (Auto)


 1.0 x10^3/uL


(1.0-4.8)


 


Monocytes # (Auto)


 1.2 x10^3/uL


(0.0-1.1)  H


 


Eosinophils # (Auto)


 0.0 x10^3/uL


(0.0-0.7)


 


Basophils # (Auto)


 0.1 x10^3/uL


(0.0-0.2)


 


Sodium Level


 140 mmol/L


(136-145)


 


Potassium Level


 4.5 mmol/L


(3.5-5.1)


 


Chloride Level


 102 mmol/L


()


 


Carbon Dioxide Level


 28 mmol/L


(21-32)


 


Anion Gap 10 (6-14)  


 


Blood Urea Nitrogen


 20 mg/dL


(8-26)


 


Creatinine


 0.9 mg/dL


(0.7-1.3)


 


Estimated GFR


(Cockcroft-Gault) 113.1  





 


BUN/Creatinine Ratio 22 (6-20)  H


 


Glucose Level


 114 mg/dL


(70-99)  H


 


Lactic Acid Level


 1.7 mmol/L


(0.4-2.0)


 


Calcium Level


 8.6 mg/dL


(8.5-10.1)


 


Total Bilirubin


 0.2 mg/dL


(0.2-1.0)


 


Aspartate Amino Transferase


(AST) 23 U/L (15-37)





 


Alanine Aminotransferase (ALT)


 34 U/L (16-63)





 


Alkaline Phosphatase


 103 U/L


()


 


Troponin I Quantitative


 < 0.017 ng/mL


(0.000-0.055)


 


NT-Pro-B-Type Natriuretic


Peptide 62 pg/mL


(0-124)


 


Total Protein


 7.7 g/dL


(6.4-8.2)


 


Albumin


 3.2 g/dL


(3.4-5.0)  L


 


Albumin/Globulin Ratio


 0.7 (1.0-1.7)


L


 


Influenza Type A Antigen


 Negative


(NEGATIVE)


 


Influenza Type B Antigen


 Negative


(NEGATIVE)





 Laboratory Tests


3/20/19 00:56








 Laboratory Tests


3/20/19 00:56














EKG


EKG


Sinus tach rate 113 no obvious acute ischemic changes noted interpreted by me 

time of encounter[]





Radiology/Procedures


Radiology/Procedures


[]


Impressions:


Chest x-ray very poor quality film due to body habitus I thought I might 

suspect a small left basilar pneumonia however this is a questionable read 

awaiting final read





Course & Med Decision Making


Course & Med Decision Making


Pertinent Labs and Imaging studies reviewed. (See chart for details)





[]38-year-old male with known asthma presenting with coughing having fevers at 

home short of breath wheezing on examination oxygen saturation is okay however 

patient appears moderately short of breath given his body habitus and his 

underlying asthma he had continued wheezing despite beta agonist therapy in the 

emergency room possible pneumonia on my read of chest x-ray final read is 

pending. Given his continued bronchospasm he will be admitted to the service of 

Dr. Lerma for further evaluation and management. I did give some antibiotics 

while we are waiting for final read of x-ray. Cardiac workup in the emergency 

room was negative





Dragon Disclaimer


Dragon Disclaimer


This electronic medical record was generated, in whole or in part, using a 

voice recognition dictation system.





Departure


Departure


Impression:  


 Primary Impression:  


 Asthma exacerbation


Disposition:  09 ADMITTED AS INPATIENT


Admitting Physician:  Other


Condition:  STABLE


Referrals:  


NO PCP (PCP)











FADY RUFF MD Mar 20, 2019 05:08

## 2019-03-20 NOTE — PDOC1
History and Physical


Date of Admission


Date of Admission


DATE: 3/20/19 


TIME: 08:05





Identification/Chief Complaint


Chief Complaint


Shortness of breath





Source


Source:  Chart review, Patient





History of Present Illness


History of Present Illness


Mr Haq is a 38 year old Male patient w/ PMHx asthma, HTN, DM2, morbid 

obesity, and newly diagnosed sleep apnea recently who presented to ED coughing 

up yellow sputum having fevers having chest pain with wheezing and quite short 

of breath overall.


He has associated chest pain in center of the chest feeling like pressure but 

also worse with coughing and with palpation.


He denies any GI or  symptoms





Past Medical History


Cardiovascular:  HTN


Pulmonary:  Asthma, Bronchitis


GI:  No pertinent hx


Heme/Onc:  No pertinent hx


Hepatobiliary:  No pertinent hx


Psych:  No pertinent hx


Rheumatologic:  No pertinent hx


Infectious disease:  No pertinent hx


ENT:  No pertinent hx


Renal/:  No pertinent hx


Endocrine:  Diabetes


Dermatology:  No pertinent hx





Past Surgical History


Past Surgical History:  No pertinent history





Family History


Family History:  Diabetes, High Cholestrol, Hypertension





Social History


Smoke:  No


ALCOHOL:  none


Drugs:  None





Current Medications


Current Medications





Current Medications


Albuterol Sulfate (Ventolin Neb Soln) 10 mg 1X  ONCE CONT NEB  Last 

administered on 3/20/19at 00:47;  Start 3/20/19 at 01:00;  Stop 3/20/19 at 01:01

;  Status DC


Methylprednisolone Sodium Succinate (SOLU-Medrol 125MG VIAL) 125 mg 1X  ONCE IV

  Last administered on 3/20/19at 01:13;  Start 3/20/19 at 01:00;  Stop 3/20/19 

at 01:01;  Status DC


Albuterol/ Ipratropium (Duoneb) 3 ml RTQID NEB  Last administered on 3/20/19at 

07:53;  Start 3/20/19 at 08:00;  Stop 3/21/19 at 07:59


Ceftriaxone Sodium (Rocephin) 1 gm 1X  ONCE IVP  Last administered on 3/20/19at 

02:33;  Start 3/20/19 at 02:30;  Stop 3/20/19 at 02:31;  Status DC


Doxycycline Hyclate (Vibra-Tab) 100 mg 1X  ONCE PO  Last administered on 3/20/

19at 02:33;  Start 3/20/19 at 02:30;  Stop 3/20/19 at 02:31;  Status DC





Active Scripts


Active


Keflex (Cephalexin) 500 Mg Capsule 1 Cap PO TID


Norco 5-325 Tablet (Acetaminophen/Hydrocodone Bitart) 1 Each Tablet 1 Tab PO 

PRN Q6HRS PRN 5 Days


Orphenadrine Citrate 100 Mg Tablet.er 1 Tab PO BID PRN


Naproxen 500 Mg Tablet 1 Tab PO BID PRN


Lidocaine 1 Each Adh..patch 1 Each TP DAILY PRN 5 Days


     Apply to affected area for 12 hours then remove and keep


     off for 12 hours.  May repeat.


Zithromax Packet (Azithromycin) 1 Gm Packet 1 Packet PO ONCE


Prednisone 50 Mg Tablet 1 Tab PO DAILY


Reported


Lantus Solostar (Insulin Glargine,Hum.rec.anlog) 100 Unit/1 Ml Insuln.pen 30 

Unit SQ BID


Amitriptyline Hcl 50 Mg Tablet 1 Tab PO QHS


Novolog (Insulin Aspart) 100 Unit/1 Ml Cartridge 30 Unit SQ TIDAC


Aspirin 81 Mg Tab.chew 1 Tab PO DAILY


Lisinopril 20 Mg Tablet 1 Tab PO DAILY





Allergies


Allergies:  


Coded Allergies:  


     Sulfa (Sulfonamide Antibiotics) (Verified  Allergy, Intermediate, 3/30/16)


     adhesive tape (Verified  Allergy, Intermediate, skin sensitive to tape, 3/

30/16)





ROS


General:  YES: Fatigue, Malaise; 


   No: Chills, Night Sweats, Appetite, Other


PSYCHOLOGICAL ROS:  No: Anxiety, Behavioral Disorder, Concentration difficultie

, Decreased libido, Depression, Disorientation, Hallucinations, Hostility, 

Irritablity, Memory difficulties, Mood Swings, Obsessive thoughts, Physical 

abuse, Sexual abuse, Sleep disturbances, Suicidal ideation, Other


Eyes:  No Blurry vision, No Decreased vision, No Double vision, No Dry eyes, No 

Excessive tearing, No Eye Pain, No Itchy Eyes, No Loss of vision, No Photophobia

, No Scotomata, No Uses contacts, No Uses glasses, No Other


HEENT:  No: Heacaches, Visual Changes, Hearing change, Nasal congestion, Nasal 

discharge, Oral lesions, Sinus pain, Sore Throat, Epistaxis, Sneezing, Snoring, 

Tinnitus, Vertigo, Vocal changes, Other


ALLERGY AND IMMUNOLOGY:  No: Hives, Insect Bite Sensitivity, Itchy/Watery Eyes, 

Nasal Congestion, Post Nasal Drip, Seasonal Allergies, Other


Hematological and Lymphatic:  No: Bleeding Problems, Blood Clots, Blood 

Transfusions, Brusing, Night Sweats, Pallor, Swollen Lymph Nodes, Other


ENDOCRINE:  No: Breast Changes, Galactorrhea, Hair Pattern Changes, Hot Flashes

, Malaise/lethargy, Mood Swings, Palpitations, Polydipsia/polyuria, Skin Changes

, Temperature Intolerance, Unexpected Weight Changes, Other


Breast:  No New/Changing Breast Lumps, No Nipple changes, No Nipple discharge, 

No Other


Respiratory:  YES: Cough, Pleuritic Pain, Shortness of breath, SOB with 

excertion, Sputum Changes, Tachypnea, Wheezing; 


   No: Hemoptysis, Orthopnea, Stridor, Other


Cardiovascular:  yes Chest Pain; 


   No Palpitations, No Orthopnea, No Paroxysmal Noc. Dyspnea, No Edema, No Lt 

Headedness, No Other


Gastrointestinal:  Yes Nausea; 


   No Vomiting, No Abdominal Pain, No Diarrhea, No Constipation, No Melena, No 

Hematochezia, No Other


Genitourinary:  No Dysuria, No Frequency, No Incontinence, No Hematuria, No 

Retention, No Discharge, No Urgency, No Pain, No Flank Pain, No Other, No , No 

, No , No , No , No , No 


Musculoskeletal:  No Gait Disturbance, No Joint Pain, No Joint Stiffness, No 

Joint Swelling, No Muscle Pain, No Muscular Weakness, No Pain In:, No Swelling 

In:, No Other


Neurological:  No Behavorial Changes, No Bowel/Bladder ControlChng, No Confusion

, No Dizziness, No Gait Disturbance, No Headaches, No Impaired Coord/balance, 

No Memory Loss, No Numbness/Tingling, No Seizures, No Speech Problems, No 

Tremors, No Visual Changes, No Weakness, No Other


Skin:  No Dry Skin, No Eczema, No Hair Changes, No Lumps, No Mole Changes, No 

Mottling, No Nail Changes, No Pruritus, No Rash, No Skin Lesion Changes, No 

Other, No Acne





Vitals


Vitals





Vital Signs








  Date Time  Temp Pulse Resp B/P (MAP) Pulse Ox O2 Delivery O2 Flow Rate FiO2


 


3/20/19 07:53     98 Room Air  


 


3/20/19 04:15 98.8 106 22 106/58 (74)    





 98.8       











Labs


Labs





Laboratory Tests








Test


 3/20/19


00:56 3/20/19


07:52


 


White Blood Count


 12.6 x10^3/uL


(4.0-11.0) 





 


Red Blood Count


 4.88 x10^6/uL


(4.30-5.70) 





 


Hemoglobin


 12.1 g/dL


(13.0-17.5) 





 


Hematocrit


 37.9 %


(39.0-53.0) 





 


Mean Corpuscular Volume 78 fL ()  


 


Mean Corpuscular Hemoglobin 25 pg (25-35)  


 


Mean Corpuscular Hemoglobin


Concent 32 g/dL


(31-37) 





 


Red Cell Distribution Width


 15.6 %


(11.5-14.5) 





 


Platelet Count


 391 x10^3/uL


(140-400) 





 


Neutrophils (%) (Auto) 82 % (31-73)  


 


Lymphocytes (%) (Auto) 8 % (24-48)  


 


Monocytes (%) (Auto) 10 % (0-9)  


 


Eosinophils (%) (Auto) 0 % (0-3)  


 


Basophils (%) (Auto) 1 % (0-3)  


 


Neutrophils # (Auto)


 10.3 x10^3uL


(1.8-7.7) 





 


Lymphocytes # (Auto)


 1.0 x10^3/uL


(1.0-4.8) 





 


Monocytes # (Auto)


 1.2 x10^3/uL


(0.0-1.1) 





 


Eosinophils # (Auto)


 0.0 x10^3/uL


(0.0-0.7) 





 


Basophils # (Auto)


 0.1 x10^3/uL


(0.0-0.2) 





 


Sodium Level


 140 mmol/L


(136-145) 





 


Potassium Level


 4.5 mmol/L


(3.5-5.1) 





 


Chloride Level


 102 mmol/L


() 





 


Carbon Dioxide Level


 28 mmol/L


(21-32) 





 


Anion Gap 10 (6-14)  


 


Blood Urea Nitrogen


 20 mg/dL


(8-26) 





 


Creatinine


 0.9 mg/dL


(0.7-1.3) 





 


Estimated GFR


(Cockcroft-Gault) 113.1 


 





 


BUN/Creatinine Ratio 22 (6-20)  


 


Glucose Level


 114 mg/dL


(70-99) 





 


Lactic Acid Level


 1.7 mmol/L


(0.4-2.0) 





 


Calcium Level


 8.6 mg/dL


(8.5-10.1) 





 


Total Bilirubin


 0.2 mg/dL


(0.2-1.0) 





 


Aspartate Amino Transf


(AST/SGOT) 23 U/L (15-37) 


 





 


Alanine Aminotransferase


(ALT/SGPT) 34 U/L (16-63) 


 





 


Alkaline Phosphatase


 103 U/L


() 





 


Troponin I Quantitative


 < 0.017 ng/mL


(0.000-0.055) 





 


NT-Pro-B-Type Natriuretic


Peptide 62 pg/mL


(0-124) 





 


Total Protein


 7.7 g/dL


(6.4-8.2) 





 


Albumin


 3.2 g/dL


(3.4-5.0) 





 


Albumin/Globulin Ratio 0.7 (1.0-1.7)  


 


Influenza Type A Antigen


 Negative


(NEGATIVE) 





 


Influenza Type B Antigen


 Negative


(NEGATIVE) 





 


Glucose (Fingerstick)


 


 398 mg/dL


(70-99)








Laboratory Tests








Test


 3/20/19


00:56 3/20/19


07:52


 


White Blood Count


 12.6 x10^3/uL


(4.0-11.0) 





 


Red Blood Count


 4.88 x10^6/uL


(4.30-5.70) 





 


Hemoglobin


 12.1 g/dL


(13.0-17.5) 





 


Hematocrit


 37.9 %


(39.0-53.0) 





 


Mean Corpuscular Volume 78 fL ()  


 


Mean Corpuscular Hemoglobin 25 pg (25-35)  


 


Mean Corpuscular Hemoglobin


Concent 32 g/dL


(31-37) 





 


Red Cell Distribution Width


 15.6 %


(11.5-14.5) 





 


Platelet Count


 391 x10^3/uL


(140-400) 





 


Neutrophils (%) (Auto) 82 % (31-73)  


 


Lymphocytes (%) (Auto) 8 % (24-48)  


 


Monocytes (%) (Auto) 10 % (0-9)  


 


Eosinophils (%) (Auto) 0 % (0-3)  


 


Basophils (%) (Auto) 1 % (0-3)  


 


Neutrophils # (Auto)


 10.3 x10^3uL


(1.8-7.7) 





 


Lymphocytes # (Auto)


 1.0 x10^3/uL


(1.0-4.8) 





 


Monocytes # (Auto)


 1.2 x10^3/uL


(0.0-1.1) 





 


Eosinophils # (Auto)


 0.0 x10^3/uL


(0.0-0.7) 





 


Basophils # (Auto)


 0.1 x10^3/uL


(0.0-0.2) 





 


Sodium Level


 140 mmol/L


(136-145) 





 


Potassium Level


 4.5 mmol/L


(3.5-5.1) 





 


Chloride Level


 102 mmol/L


() 





 


Carbon Dioxide Level


 28 mmol/L


(21-32) 





 


Anion Gap 10 (6-14)  


 


Blood Urea Nitrogen


 20 mg/dL


(8-26) 





 


Creatinine


 0.9 mg/dL


(0.7-1.3) 





 


Estimated GFR


(Cockcroft-Gault) 113.1 


 





 


BUN/Creatinine Ratio 22 (6-20)  


 


Glucose Level


 114 mg/dL


(70-99) 





 


Lactic Acid Level


 1.7 mmol/L


(0.4-2.0) 





 


Calcium Level


 8.6 mg/dL


(8.5-10.1) 





 


Total Bilirubin


 0.2 mg/dL


(0.2-1.0) 





 


Aspartate Amino Transf


(AST/SGOT) 23 U/L (15-37) 


 





 


Alanine Aminotransferase


(ALT/SGPT) 34 U/L (16-63) 


 





 


Alkaline Phosphatase


 103 U/L


() 





 


Troponin I Quantitative


 < 0.017 ng/mL


(0.000-0.055) 





 


NT-Pro-B-Type Natriuretic


Peptide 62 pg/mL


(0-124) 





 


Total Protein


 7.7 g/dL


(6.4-8.2) 





 


Albumin


 3.2 g/dL


(3.4-5.0) 





 


Albumin/Globulin Ratio 0.7 (1.0-1.7)  


 


Influenza Type A Antigen


 Negative


(NEGATIVE) 





 


Influenza Type B Antigen


 Negative


(NEGATIVE) 





 


Glucose (Fingerstick)


 


 398 mg/dL


(70-99)











VTE Prophylaxis Ordered


VTE Prophylaxis Devices:  Yes


VTE Pharmacological Prophylaxi:  No





Assessment/Plan


Assessment/Plan


A/P:


Acute asthma exacerbation - will cont steroids, nebs, add pulmicort. He does 

not have a primary pulmonologist, would like to see one while in house


Acute bronchitis - meets sepsis criteria with his RR and leukocytosis, covered 

with rocephin and doxy. Given IVF


Chest pain - EKG and trop negative, seems pleuritic


HTN - will continue home meds


DM2 - on 30u BID lantus and 35u TID novolog with frequent hypoglycemia, I will 

reduce his novolog dosing and add bolus plus sliding scale to 25U TID and high 

sliding


Morbid obesity - counseled on weight loss


Newly diagnosed sleep apnea - he would like a repeat CPAP titration tonight





FEN - ADA diet


PPX - SCDs


FULL CODE


Inpatient for acute asthma exacerbation











GUDELIA SCHMID MD Mar 20, 2019 08:07

## 2019-03-20 NOTE — EKG
[facility]

              [address] [place] [address]

Test Date:    2019               Test Time:    00:30:27

Pat Name:     TERESA SAMSON         Department:   

Patient ID:   [id number]           Room:         569 1

Gender:       M                        Technician:   

:          1980               Requested By: FADY RUFF

Order Number: [id number]           Reading MD:   Keo Mederos MD

                                 Measurements

Intervals                              Axis          

Rate:         113                      P:            54

MA:           140                      QRS:          17

QRSD:         90                       T:            40

QT:           306                                    

QTc:          419                                    

                           Interpretive Statements

SINUS TACHYCARDIA

VENTRICULAR PREMATURE COMPLEX(ES)





Electronically Signed On 3- 16:15:37 CDT by Keo Mederos MD

## 2019-03-20 NOTE — NUR
The patient, TERESA SAMSON, 39 y/o, M admitted by GUDELIA SCHMID MD, was given 
written information regarding hospital policies, unit procedures and contact persons. 
Patient arrived to room via wheelchair assisted by ED staff member. 



Valuables were checked and noted. Patient is currently laying in bed at this time. Patient 
was provided with a hospital gown and ice water upon patient request. Patient states that he 
is not experiencing any pain or shortness of breath at this time. This RN will continue to 
monitor the patient.

## 2019-03-20 NOTE — RAD
Indication:fever

 

TECHNIQUE:Portable AP chest X-ray

 

COMPARISON:6/12/2018

 

FINDINGS: Heart is normal in size. Bilateral prominent bronchial markings 

are seen with interstitial opacities. No focal consolidation. No 

pneumothorax or pleural effusion. Visualized bony thorax is within normal 

limits.

 

IMPRESSION: Findings of bronchitis/atypical/viral infection.

 

Electronically signed by: Stanton Benoit DO (3/20/2019 7:32 AM) [facility]

## 2019-03-21 VITALS — DIASTOLIC BLOOD PRESSURE: 67 MMHG | SYSTOLIC BLOOD PRESSURE: 110 MMHG

## 2019-03-21 VITALS — DIASTOLIC BLOOD PRESSURE: 74 MMHG | SYSTOLIC BLOOD PRESSURE: 120 MMHG

## 2019-03-21 VITALS — DIASTOLIC BLOOD PRESSURE: 73 MMHG | SYSTOLIC BLOOD PRESSURE: 113 MMHG

## 2019-03-21 VITALS — SYSTOLIC BLOOD PRESSURE: 118 MMHG | DIASTOLIC BLOOD PRESSURE: 77 MMHG

## 2019-03-21 VITALS — DIASTOLIC BLOOD PRESSURE: 79 MMHG | SYSTOLIC BLOOD PRESSURE: 132 MMHG

## 2019-03-21 VITALS — DIASTOLIC BLOOD PRESSURE: 86 MMHG | SYSTOLIC BLOOD PRESSURE: 144 MMHG

## 2019-03-21 VITALS — DIASTOLIC BLOOD PRESSURE: 98 MMHG | SYSTOLIC BLOOD PRESSURE: 173 MMHG

## 2019-03-21 VITALS — DIASTOLIC BLOOD PRESSURE: 74 MMHG | SYSTOLIC BLOOD PRESSURE: 123 MMHG

## 2019-03-21 VITALS — SYSTOLIC BLOOD PRESSURE: 126 MMHG | DIASTOLIC BLOOD PRESSURE: 65 MMHG

## 2019-03-21 LAB
ANION GAP SERPL CALC-SCNC: 8 MMOL/L (ref 6–14)
BUN SERPL-MCNC: 15 MG/DL (ref 8–26)
CALCIUM SERPL-MCNC: 8.2 MG/DL (ref 8.5–10.1)
CHLORIDE SERPL-SCNC: 97 MMOL/L (ref 98–107)
CO2 SERPL-SCNC: 31 MMOL/L (ref 21–32)
CREAT SERPL-MCNC: 1.1 MG/DL (ref 0.7–1.3)
GFR SERPLBLD BASED ON 1.73 SQ M-ARVRAT: 89.7 ML/MIN
GLUCOSE SERPL-MCNC: 293 MG/DL (ref 70–99)
POTASSIUM SERPL-SCNC: 3.9 MMOL/L (ref 3.5–5.1)
SODIUM SERPL-SCNC: 136 MMOL/L (ref 136–145)

## 2019-03-21 PROCEDURE — 5A09357 ASSISTANCE WITH RESPIRATORY VENTILATION, LESS THAN 24 CONSECUTIVE HOURS, CONTINUOUS POSITIVE AIRWAY PRESSURE: ICD-10-PCS | Performed by: INTERNAL MEDICINE

## 2019-03-21 RX ADMIN — INSULIN GLARGINE SCH UNITS: 100 INJECTION, SOLUTION SUBCUTANEOUS at 08:45

## 2019-03-21 RX ADMIN — DOXYCYCLINE HYCLATE SCH MG: 100 TABLET, COATED ORAL at 20:57

## 2019-03-21 RX ADMIN — INSULIN GLARGINE SCH UNITS: 100 INJECTION, SOLUTION SUBCUTANEOUS at 21:08

## 2019-03-21 RX ADMIN — IPRATROPIUM BROMIDE AND ALBUTEROL SULFATE SCH ML: .5; 3 SOLUTION RESPIRATORY (INHALATION) at 20:12

## 2019-03-21 RX ADMIN — DOXYCYCLINE HYCLATE SCH MG: 100 TABLET, COATED ORAL at 08:42

## 2019-03-21 RX ADMIN — BUDESONIDE SCH MG: 0.5 INHALANT RESPIRATORY (INHALATION) at 20:12

## 2019-03-21 RX ADMIN — FAMOTIDINE SCH MG: 20 TABLET ORAL at 20:57

## 2019-03-21 RX ADMIN — SERTRALINE HYDROCHLORIDE SCH MG: 50 TABLET ORAL at 16:28

## 2019-03-21 RX ADMIN — INSULIN LISPRO SCH UNITS: 100 INJECTION, SOLUTION INTRAVENOUS; SUBCUTANEOUS at 08:18

## 2019-03-21 RX ADMIN — INSULIN LISPRO SCH UNITS: 100 INJECTION, SOLUTION INTRAVENOUS; SUBCUTANEOUS at 08:19

## 2019-03-21 RX ADMIN — AMITRIPTYLINE HYDROCHLORIDE SCH MG: 50 TABLET, FILM COATED ORAL at 20:57

## 2019-03-21 RX ADMIN — IPRATROPIUM BROMIDE AND ALBUTEROL SULFATE SCH ML: .5; 3 SOLUTION RESPIRATORY (INHALATION) at 14:47

## 2019-03-21 RX ADMIN — BUDESONIDE SCH MG: 0.5 INHALANT RESPIRATORY (INHALATION) at 08:32

## 2019-03-21 RX ADMIN — ASPIRIN 81 MG SCH MG: 81 TABLET ORAL at 08:42

## 2019-03-21 RX ADMIN — IPRATROPIUM BROMIDE AND ALBUTEROL SULFATE SCH ML: .5; 3 SOLUTION RESPIRATORY (INHALATION) at 12:34

## 2019-03-21 RX ADMIN — IPRATROPIUM BROMIDE AND ALBUTEROL SULFATE SCH ML: .5; 3 SOLUTION RESPIRATORY (INHALATION) at 08:32

## 2019-03-21 RX ADMIN — LISINOPRIL SCH MG: 20 TABLET ORAL at 08:41

## 2019-03-21 RX ADMIN — INSULIN LISPRO SCH UNITS: 100 INJECTION, SOLUTION INTRAVENOUS; SUBCUTANEOUS at 12:14

## 2019-03-21 RX ADMIN — INSULIN LISPRO SCH UNITS: 100 INJECTION, SOLUTION INTRAVENOUS; SUBCUTANEOUS at 12:15

## 2019-03-21 RX ADMIN — INSULIN LISPRO SCH UNITS: 100 INJECTION, SOLUTION INTRAVENOUS; SUBCUTANEOUS at 17:06

## 2019-03-21 NOTE — RAD
Examination: CT angiography chest

 

HISTORY: History of chest pain, shortness of breath

 

COMPARISON: None available

 

TECHNIQUE: Axial CT and radiographic images of chest were performed with 

IV contrast. Coronal and sagittal 3-D MIP reformats are performed 

 

Exposure: One or more of the following individualized dose reduction 

techniques were utilized for this examination:  1. Automated exposure 

control  2. Adjustment of the mA and/or kV according to patient size  3. 

Use of iterative reconstruction technique

 

FINDINGS:

 

The central airways are patent.

 

Mild cardiomegaly.

 

There is not enough contrast within the pulmonary arteries and its 

branches. Examination is nondiagnostic for pulmonary embolism.

 

The caliber of the aorta grossly appears unremarkable.

 

Few prominent mediastinal lymph nodes identified with the largest 

measuring 1.2 cm in the pretracheal region. Minimal bibasilar lung 

atelectasis. The visualized liver, spleen, adrenals grossly appears 

unremarkable.

 

Multiple large gallstones identified in the gallbladder.

 

Mild degenerative changes thoracic spine.

 

Examination limited due to patient body habitus.

 

 

IMPRESSION:

 

1. Examination is nondiagnostic as there is not enough contrast within the

pulmonary artery and its branches for evaluation of pulmonary embolism.

 

2. Minimal bibasilar lung atelectasis.

 

3. Cholelithiasis.

 

4. Minimal prominent mediastinal lymph nodes, nonspecific.

 

Electronically signed by: Pablo Sheikh MD (3/21/2019 3:19 PM) [id number]

## 2019-03-21 NOTE — NUR
Dr. Lipscomb notified [facility] called at 1440 with patient complaint of chest pressure 
and pain rating at 9-10/10 and chest tightness.  Zoila RN ICU and Natalie RN nursing 
supervisor responded, 12 lead EKG shows ST, patient had breathing treatment and pain 
medication and pain now 5-6/10.  Patient verb. feeling better and breathing easier.  See 
frequent VS record and orders.

## 2019-03-21 NOTE — CONS
DATE OF CONSULTATION:  



ATTENDING PHYSICIAN:  Dr. Ramon.



REASON FOR CONSULTATION:  Dyspnea.



HISTORY OF PRESENT ILLNESS:  The patient is a 38-year-old male who weighs 533

pounds with a BMI of 67.  He is morbidly obese.  He presented to the hospital

with complaint of shortness of breath for over a week.  He had a cough with

green sputum production.  He had some subjective fever.  He has also chest pain

along with wheezing.  The patient has no significant history of tobacco use.  He

had no headaches, no nausea, vomiting, no diarrhea.  He said he has been

diagnosed with sleep apnea; however, he has not been approved for CPAP because

of his insurance.  The patient has gained about 20 pounds in the last one month.

 He quit tobacco in 2004, smoked probably for less than 10 years.  No history of

deep vein thrombosis or pulmonary embolism.  He has some lower extremity edema

as well.  His chest x-ray showed prominent interstitial markings.  I have been

asked to see him for further evaluation.



PAST MEDICAL HISTORY:  History of asthmatic bronchitis, hypertension, morbid

obesity, obstructive sleep apnea, diabetes.



PAST SURGICAL HISTORY:  No recent surgeries.



FAMILY HISTORY:  Diabetes, dyslipidemia and hypertension.



SOCIAL HISTORY:  Nonsmoker.  He quit in 2004.  Smoked less than 10 years.



ALLERGIES:  SULFA.



CURRENT MEDICATIONS:  Reviewed as listed in the MRAD including oral prednisone,

nebulizers and oral doxycycline.



REVIEW OF SYSTEMS:  Twelve-point system obtained.  Pertinent positives discussed

in my history of present illness, otherwise noncontributory.  All systems that

were negative were reviewed as well.



PHYSICAL EXAMINATION:

VITAL SIGNS:  Reviewed.  Pulse ox 96% room air, blood pressure on the high side.

 Afebrile.

HEENT:  Sclerae nonicteric.

NECK:  Supple.

LUNGS:  No wheezing.

CARDIOVASCULAR:  Regular rate and rhythm.

ABDOMEN:  Markedly obese.

EXTREMITIES:  With 1+ pitting edema.



LABORATORY DATA:  Reviewed.  Influenza screen is negative.  BUN 15, creatinine

1.1.  White cell count 12.6, hemoglobin 12.1 and platelets are 391.



IMPRESSION:

1.  Dyspnea with acute hypoxic respiratory failure requiring BiPAP initially and

now on room air.  He has mildly prominent interstitial markings.  He has cough

with green sputum production and subjective fever at home and has chest pain. 

The differential diagnosis would include the following.

A.  Acute viral pneumonitis.  Influenza screen, however, was negative.

B.  A 20-pound weight gain in last one month contributing to the patient's

symptoms.  Possible acute cor pulmonale.

C.  Chest pain, could be atypical, but needs to rule out thromboembolic disease.

 With this size, we will see if we can get a CT angiogram.

2.  Morbid obesity with suspected obstructive sleep apnea.  He says he was

diagnosed with sleep apnea, but his insurance has not approved CPAP.

3.  Underlying obesity hypoventilation syndrome.

4.  No significant history of tobacco use.

5.  Abnormal chest x-ray with prominent interstitial markings, likely viral

pneumonitis.  Needs to do an echo to rule out any LV dysfunction and congestive

heart failure.



RECOMMENDATIONS:

1.  Continue with present bronchodilators.

2.  Oral prednisone.

3.  Continue oral antibiotics.

4.  We will obtain venous Dopplers of lower extremities and will also obtain a

CTA chest.

5.  Weight loss is strongly emphasized to the patient.

6.  P.r.n.  BiPAP.

7.  Further recommendations to follow.

 



______________________________

MOLLY DIANA MD DR:  CHLOE/jairo  JOB#:  [id number] / [id number]

DD:  03/21/2019 10:19  DT:  03/21/2019 10:51

## 2019-03-21 NOTE — RAD
Bilateral lower extremity venous duplex study 3/21/2019 10:19 AM

 

Clinical History: Shortness of breath, EDEMA

 

Comparison: None

 

Technique: Using a combination of real time ultrasound imaging and 

color-flow and pulse Doppler imaging techniques along with graded 

compression and augmentation, duplex evaluation of the deep venous system 

of the both lower extremities was performed. Multiple images were 

obtained.

 

Findings: Exam somewhat limited by body habitus. There is no sonographic 

evidence of deep venous thrombosis involving the visualized deep venous 

structures of either lower extremity.

 

Impression: No evidence of deep venous thrombosis involving either lower 

extremity.

 

Electronically signed by: Joey Ramirez MD (3/21/2019 2:16 PM) [id number]

## 2019-03-21 NOTE — EKG
[facility]

              [address] [place] [address]

Test Date:    2019               Test Time:    14:53:35

Pat Name:     TERESA SAMSON         Department:   

Patient ID:   [id number]           Room:         569 1

Gender:       M                        Technician:   

:          1980               Requested By: GUDELIA SCHMID

Order Number: [id number]           Reading MD:   Keo Mederos MD

                                 Measurements

Intervals                              Axis          

Rate:         108                      P:            51

MT:           150                      QRS:          42

QRSD:         88                       T:            43

QT:           316                                    

QTc:          427                                    

                           Interpretive Statements

SINUS TACHYCARDIA



Electronically Signed On 3- 17:26:01 CDT by Keo Mederos MD

## 2019-03-21 NOTE — CARD
MR#: [id number]

Account#: [id number]

Accession#: [id number]

Date of Study: 03/21/2019

Ordering Physician: MOLLY DIANA, 

Referring Physician: GUDELIA SCHMID, 

Tech: Tati Farah ALEC





--------------- APPROVED REPORT --------------





EXAM: Two-dimensional and M-mode echocardiogram with Doppler, color Doppler with contrast.



Other Information 

Quality : Technically LimitedHR: 97bpm

Rhythm : NSRTechnically limited study due to  body habitus.



INDICATION

Congestive Heart Failure 



Echo Enhancing Agent

Indication: Endocardial border delineation

Agent/Amount Used: Optison 1mL



2D DIMENSIONS 

RVDd2.9 (2.9-3.5cm)Left Atrium(2D)3.6 (1.6-4.0cm)

IVSd1.4 (0.7-1.1cm)Aortic Root(2D)3.5 (2.0-3.7cm)

LVDd5.4 (3.9-5.9cm)LVOT Diameter2.5 (1.8-2.4cm)

PWd1.5 (0.7-1.1cm)LVDs3.6 (2.5-4.0cm)

FS (%) 33.4 %SV86.9 ml

LVEF(%)61.5 (>50%)



M-Mode DIMENSIONS 

Left Atrium(MM)3.48 (2.5-4.0cm)Aortic Root4.01 (2.2-3.7cm)



Aortic Valve

AoV Peak Suhail.119.8cm/sAoV VTI18.1cm

AO Peak GR.5.7mmHgLVOT Peak Suhail.107.9cm/s

AO Mean GR.3mmHgAVA (VMAX)4.28cm2

BAILEY   (VTI)3.90cm2



Mitral Valve

MV E Ecuhtykl57.2cm/sMV E Peak Gr.4mmHg

MV DECEL [id number] A Vwupeyvs60.9cm/s

MV E Mean Gr.2mmHgE/A  Ratio1.2

MV A Hosygfuv05ad



Pulmonary Valve

PV Peak Cmnnypso58.9cm/s



 LEFT VENTRICLE 

The left ventricle is normal size. There is moderate concentric left ventricular hypertrophy. The lef
t ventricular systolic function is normal and the ejection fraction is within normal range. The Eject
ion Fraction is 60-65%. There is normal LV segmental wall motion. Tissue Doppler imaging reveals mode
rate left ventricular diastolic dysfunction.



 RIGHT VENTRICLE 

The right ventricle is not well visualized.



 ATRIA 

Not well visualized. 



 AORTIC VALVE 

The aortic valve is not well visualized.



 MITRAL VALVE 

The mitral valve is not well visualized.



 TRICUSPID VALVE 

The tricuspid valve is not well visualized.



 PULMONIC VALVE 

The pulmonic valve is not well visualized.



 GREAT VESSELS 

Not well visualized. 



 PERICARDIAL EFFUSION 

There is no evidence of significant pericardial effusion.



Critical Notification

Critical Value: No



<Conclusion>

The left ventricular systolic function is normal and the ejection fraction is within normal range. Th
e Ejection Fraction is 60-65%.

There is normal LV segmental wall motion.

Technically very difficult study despite contrast use. Unable to visualize majority of the RV and masha
ves. 



Signed by : Keo Mederos, 

Electronically Approved : 03/21/2019 12:05:24

## 2019-03-21 NOTE — PDOC
PROGRESS NOTES


Chief Complaint


Chief Complaint


A/P:


Acute asthma exacerbation - will cont steroids, nebs, add pulmicort. He does 

not have a primary pulmonologist, would like to see one while in house


Acute bronchitis - meets sepsis criteria with his RR and leukocytosis, covered 

with rocephin and doxy. Given IVF


Chest pain - EKG and trop negative, seems pleuritic. He states he has been 

gaining weight, Echo shows normal cardiac function


HTN - will continue home meds


DM2 - on 30u BID lantus and 35u TID novolog with frequent hypoglycemia, I will 

reduce his novolog dosing and add bolus plus sliding scale to 25U TID and high 

sliding


Morbid obesity - counseled on weight loss


Newly diagnosed sleep apnea - BIPAP 26/22 overnight. Unfortunately, apparently 

his insurance has not approved this





FEN - ADA diet


PPX - SCDs


FULL CODE


Inpatient for acute asthma exacerbation, may have element of cor pulmonale, 

will consult pulm on his case. May need CT chest





History of Present Illness


History of Present Illness


Mr Haq is a 38 year old Male patient w/ PMHx asthma, HTN, DM2, morbid 

obesity, and newly diagnosed sleep apnea recently who presented to ED coughing 

up yellow sputum having fevers having chest pain with wheezing and quite short 

of breath overall.


He has associated chest pain in center of the chest feeling like pressure but 

also worse with coughing and with palpation.


He denies any GI or  symptoms





Feeling a bit better today. Going for CTPA per pulmonology.





ECHO:


The left ventricular systolic function is normal and the ejection fraction is 

within normal range. The Ejection Fraction is 60-65%.


There is normal LV segmental wall motion.


Technically very difficult study despite contrast use. Unable to visualize 

majority of the RV and valves.





Vitals


Vitals





Vital Signs








  Date Time  Temp Pulse Resp B/P (MAP) Pulse Ox O2 Delivery O2 Flow Rate FiO2


 


3/21/19 05:51      BiPAP/CPAP  


 


3/21/19 03:00 98.0 94 20 113/73 (86) 98   





 98.0       











Physical Exam


General:  Alert, Oriented X3, Cooperative, mild distress


Heart:  Regular rate, Normal S1, Normal S2


Lungs:  Other (Scattered bilateral wheezing)


Abdomen:  Normal bowel sounds, Soft


Extremities:  No clubbing, No cyanosis


Skin:  No rashes, No breakdown





Labs


LABS





Laboratory Tests








Test


 3/20/19


11:28 3/20/19


16:57 3/20/19


20:48


 


Glucose (Fingerstick)


 459 mg/dL


(70-99) 322 mg/dL


(70-99) 397 mg/dL


(70-99)











Comment


Review of Relevant


I have reviewed the following items martha (where applicable) has been applied.


Labs





Laboratory Tests








Test


 3/20/19


00:56 3/20/19


07:52 3/20/19


11:28 3/20/19


16:57


 


White Blood Count


 12.6 x10^3/uL


(4.0-11.0) 


 


 





 


Red Blood Count


 4.88 x10^6/uL


(4.30-5.70) 


 


 





 


Hemoglobin


 12.1 g/dL


(13.0-17.5) 


 


 





 


Hematocrit


 37.9 %


(39.0-53.0) 


 


 





 


Mean Corpuscular Volume 78 fL ()    


 


Mean Corpuscular Hemoglobin 25 pg (25-35)    


 


Mean Corpuscular Hemoglobin


Concent 32 g/dL


(31-37) 


 


 





 


Red Cell Distribution Width


 15.6 %


(11.5-14.5) 


 


 





 


Platelet Count


 391 x10^3/uL


(140-400) 


 


 





 


Neutrophils (%) (Auto) 82 % (31-73)    


 


Lymphocytes (%) (Auto) 8 % (24-48)    


 


Monocytes (%) (Auto) 10 % (0-9)    


 


Eosinophils (%) (Auto) 0 % (0-3)    


 


Basophils (%) (Auto) 1 % (0-3)    


 


Neutrophils # (Auto)


 10.3 x10^3uL


(1.8-7.7) 


 


 





 


Lymphocytes # (Auto)


 1.0 x10^3/uL


(1.0-4.8) 


 


 





 


Monocytes # (Auto)


 1.2 x10^3/uL


(0.0-1.1) 


 


 





 


Eosinophils # (Auto)


 0.0 x10^3/uL


(0.0-0.7) 


 


 





 


Basophils # (Auto)


 0.1 x10^3/uL


(0.0-0.2) 


 


 





 


Sodium Level


 140 mmol/L


(136-145) 


 


 





 


Potassium Level


 4.5 mmol/L


(3.5-5.1) 


 


 





 


Chloride Level


 102 mmol/L


() 


 


 





 


Carbon Dioxide Level


 28 mmol/L


(21-32) 


 


 





 


Anion Gap 10 (6-14)    


 


Blood Urea Nitrogen


 20 mg/dL


(8-26) 


 


 





 


Creatinine


 0.9 mg/dL


(0.7-1.3) 


 


 





 


Estimated GFR


(Cockcroft-Gault) 113.1 


 


 


 





 


BUN/Creatinine Ratio 22 (6-20)    


 


Glucose Level


 114 mg/dL


(70-99) 


 


 





 


Lactic Acid Level


 1.7 mmol/L


(0.4-2.0) 


 


 





 


Calcium Level


 8.6 mg/dL


(8.5-10.1) 


 


 





 


Total Bilirubin


 0.2 mg/dL


(0.2-1.0) 


 


 





 


Aspartate Amino Transf


(AST/SGOT) 23 U/L (15-37) 


 


 


 





 


Alanine Aminotransferase


(ALT/SGPT) 34 U/L (16-63) 


 


 


 





 


Alkaline Phosphatase


 103 U/L


() 


 


 





 


Troponin I Quantitative


 < 0.017 ng/mL


(0.000-0.055) 


 


 





 


NT-Pro-B-Type Natriuretic


Peptide 62 pg/mL


(0-124) 


 


 





 


Total Protein


 7.7 g/dL


(6.4-8.2) 


 


 





 


Albumin


 3.2 g/dL


(3.4-5.0) 


 


 





 


Albumin/Globulin Ratio 0.7 (1.0-1.7)    


 


Influenza Type A Antigen


 Negative


(NEGATIVE) 


 


 





 


Influenza Type B Antigen


 Negative


(NEGATIVE) 


 


 





 


Glucose (Fingerstick)


 


 398 mg/dL


(70-99) 459 mg/dL


(70-99) 322 mg/dL


(70-99)


 


Test


 3/20/19


20:48 


 


 





 


Glucose (Fingerstick)


 397 mg/dL


(70-99) 


 


 











Laboratory Tests








Test


 3/20/19


11:28 3/20/19


16:57 3/20/19


20:48


 


Glucose (Fingerstick)


 459 mg/dL


(70-99) 322 mg/dL


(70-99) 397 mg/dL


(70-99)








Microbiology


3/20/19 Blood Culture - Preliminary, Resulted


          NO GROWTH AFTER 1 DAY


Medications





Current Medications


Albuterol Sulfate (Ventolin Neb Soln) 10 mg 1X  ONCE CONT NEB  Last 

administered on 3/20/19at 00:47;  Start 3/20/19 at 01:00;  Stop 3/20/19 at 01:01

;  Status DC


Methylprednisolone Sodium Succinate (SOLU-Medrol 125MG VIAL) 125 mg 1X  ONCE IV

  Last administered on 3/20/19at 01:13;  Start 3/20/19 at 01:00;  Stop 3/20/19 

at 01:01;  Status DC


Albuterol/ Ipratropium (Duoneb) 3 ml RTQID NEB  Last administered on 3/20/19at 

11:11;  Start 3/20/19 at 08:00;  Stop 3/20/19 at 15:10;  Status DC


Ceftriaxone Sodium (Rocephin) 1 gm 1X  ONCE IVP  Last administered on 3/20/19at 

02:33;  Start 3/20/19 at 02:30;  Stop 3/20/19 at 02:31;  Status DC


Doxycycline Hyclate (Vibra-Tab) 100 mg 1X  ONCE PO  Last administered on 3/20/

19at 02:33;  Start 3/20/19 at 02:30;  Stop 3/20/19 at 02:31;  Status DC


Amitriptyline HCl (Elavil) 50 mg QHS PO  Last administered on 3/20/19at 21:27;  

Start 3/20/19 at 21:00


Aspirin (Children'S Aspirin) 81 mg DAILY PO ;  Start 3/21/19 at 09:00


Acetaminophen/ Hydrocodone Bitart (Lortab 5/325) 1 tab PRN Q6HRS  PRN PO PAIN;  

Start 3/20/19 at 11:00


Insulin Glargine (Lantus) 30 units BID SQ  Last administered on 3/20/19at 21:30

;  Start 3/20/19 at 11:30;  Stop 3/21/19 at 07:59;  Status DC


Lisinopril (Prinivil) 20 mg DAILY PO  Last administered on 3/20/19at 12:34;  

Start 3/20/19 at 11:30


Non-Formulary Medication (Insulin Aspart (Novolog)) 10 unit TIDAC SQ ;  Start 3/

20/19 at 11:30;  Stop 3/20/19 at 11:30;  Status DC


Insulin Human Lispro (HumaLOG) 0-7 UNITS TIDWMEALS SQ  Last administered on 3/20

/19at 18:59;  Start 3/20/19 at 12:00


Dextrose (Dextrose 50%-Water Syringe) 12.5 gm PRN Q15MIN  PRN IV SEE COMMENTS;  

Start 3/20/19 at 11:00


Insulin Human Lispro (HumaLOG) 10 units TIDWMEALS SQ ;  Start 3/20/19 at 12:00;

  Stop 3/20/19 at 12:00;  Status DC


Insulin Human Lispro (HumaLOG) 25 units TIDWMEALS SQ  Last administered on 3/20/

19at 18:59;  Start 3/20/19 at 12:00;  Stop 3/21/19 at 08:00;  Status DC


Budesonide (Pulmicort) 0.5 mg RTBID NEB  Last administered on 3/20/19at 19:39;  

Start 3/20/19 at 20:00


Albuterol/ Ipratropium (Duoneb) 3 ml RTQID NEB  Last administered on 3/20/19at 

19:39;  Start 3/20/19 at 16:00


Doxycycline Hyclate (Vibra-Tab) 100 mg BID PO  Last administered on 3/20/19at 18

:55;  Start 3/20/19 at 15:30


Prednisone (Prednisone) 20 mg DAILY PO ;  Start 3/21/19 at 09:00


Insulin Glargine (Lantus) 35 units BID SQ ;  Start 3/21/19 at 09:00;  Status UNV


Insulin Human Lispro (HumaLOG) 30 units TIDWMEALS SQ ;  Start 3/21/19 at 08:00;

  Status UNV





Active Scripts


Active


Norco 5-325 Tablet (Acetaminophen/Hydrocodone Bitart) 1 Each Tablet 1 Tab PO 

PRN Q6HRS PRN 5 Days


Orphenadrine Citrate 100 Mg Tablet.er 1 Tab PO BID PRN


Naproxen 500 Mg Tablet 1 Tab PO BID PRN


Reported


Atorvastatin Calcium 40 Mg Tablet 40 Mg PO QHS


Sertraline Hcl 100 Mg Tablet 100 Mg PO DAILY


Fluticasone Propionate Nasal Spray (Fluticasone Propionate) 16 Gm [place].susp 2 

Spr ASIM DAILY


Famotidine 40 Mg Tablet 40 Mg PO DAILY


Ibuprofen 800 Mg Tablet 1 Tab PO PRN Q6-8HRS PRN


Trazodone Hcl 100 Mg Tablet 100 Mg PO PRN QHS PRN


Lantus Solostar (Insulin Glargine,Hum.rec.anlog) 100 Unit/1 Ml Insuln.pen 30 

Unit SQ BID


Amitriptyline Hcl 50 Mg Tablet 2 Tab PO QHS


Novolog (Insulin Aspart) 100 Unit/1 Ml Cartridge 50 Unit SQ TIDAC


Aspirin 81 Mg Tab.chew 1 Tab PO DAILY


Lisinopril 20 Mg Tablet 2 Tab PO DAILY


Vitals/I & O





Vital Sign - Last 24 Hours








 3/20/19 3/20/19 3/20/19 3/20/19





 11:00 11:11 12:34 15:00


 


Temp 98.2   97.4





 98.2   97.4


 


Pulse 106  106 101


 


Resp 24   20


 


B/P (MAP) 140/78 (98)  140/78 103/64 (77)


 


Pulse Ox 92   97


 


O2 Delivery Room Air Room Air  Room Air


 


    





    





 3/20/19 3/20/19 3/20/19 3/20/19





 15:21 19:00 19:40 19:40


 


Temp  98.0  





  98.0  


 


Pulse  100  


 


Resp  20  


 


B/P (MAP)  128/83 (98)  


 


Pulse Ox  99  


 


O2 Delivery Room Air Room Air Room Air Room Air


 


    





    





 3/20/19 3/20/19 3/21/19 3/21/19





 20:00 23:00 01:23 03:00


 


Temp  98.0  98.0





  98.0  98.0


 


Pulse  98  94


 


Resp  20  20


 


B/P (MAP)  144/90 (108)  113/73 (86)


 


Pulse Ox  100  98


 


O2 Delivery Room Air Room Air BiPAP/CPAP Room Air


 


    





    





 3/21/19 3/21/19  





 03:09 05:51  


 


O2 Delivery BiPAP/CPAP BiPAP/CPAP  














Intake and Output   


 


 3/20/19 3/20/19 3/21/19





 14:59 22:59 06:59


 


Intake Total  200 ml 300 ml


 


Balance  200 ml 300 ml

















GUDELIA SCHMID MD Mar 21, 2019 08:01

## 2019-03-22 VITALS — DIASTOLIC BLOOD PRESSURE: 102 MMHG | SYSTOLIC BLOOD PRESSURE: 161 MMHG

## 2019-03-22 VITALS — DIASTOLIC BLOOD PRESSURE: 81 MMHG | SYSTOLIC BLOOD PRESSURE: 116 MMHG

## 2019-03-22 VITALS — DIASTOLIC BLOOD PRESSURE: 74 MMHG | SYSTOLIC BLOOD PRESSURE: 153 MMHG

## 2019-03-22 RX ADMIN — INSULIN LISPRO SCH UNITS: 100 INJECTION, SOLUTION INTRAVENOUS; SUBCUTANEOUS at 12:29

## 2019-03-22 RX ADMIN — ASPIRIN 81 MG SCH MG: 81 TABLET ORAL at 08:41

## 2019-03-22 RX ADMIN — LISINOPRIL SCH MG: 20 TABLET ORAL at 08:41

## 2019-03-22 RX ADMIN — FAMOTIDINE SCH MG: 20 TABLET ORAL at 08:42

## 2019-03-22 RX ADMIN — IPRATROPIUM BROMIDE AND ALBUTEROL SULFATE SCH ML: .5; 3 SOLUTION RESPIRATORY (INHALATION) at 11:10

## 2019-03-22 RX ADMIN — INSULIN LISPRO SCH UNITS: 100 INJECTION, SOLUTION INTRAVENOUS; SUBCUTANEOUS at 08:00

## 2019-03-22 RX ADMIN — INSULIN GLARGINE SCH UNITS: 100 INJECTION, SOLUTION SUBCUTANEOUS at 08:49

## 2019-03-22 RX ADMIN — IPRATROPIUM BROMIDE AND ALBUTEROL SULFATE SCH ML: .5; 3 SOLUTION RESPIRATORY (INHALATION) at 07:37

## 2019-03-22 RX ADMIN — BUDESONIDE SCH MG: 0.5 INHALANT RESPIRATORY (INHALATION) at 07:37

## 2019-03-22 RX ADMIN — DOXYCYCLINE HYCLATE SCH MG: 100 TABLET, COATED ORAL at 08:41

## 2019-03-22 RX ADMIN — INSULIN LISPRO SCH UNITS: 100 INJECTION, SOLUTION INTRAVENOUS; SUBCUTANEOUS at 12:31

## 2019-03-22 RX ADMIN — SERTRALINE HYDROCHLORIDE SCH MG: 50 TABLET ORAL at 08:42

## 2019-03-22 NOTE — PDOC
PULMONARY PROGRESS NOTES


Subjective


no soa/ mild cough


Vitals





Vital Signs








  Date Time  Temp Pulse Resp B/P (MAP) Pulse Ox O2 Delivery O2 Flow Rate FiO2


 


3/22/19 11:12     100 Room Air  


 


3/22/19 08:41  98  161/102    


 


3/22/19 07:00 98.1  17     





 98.1       








General:  Alert, No acute distress


Lungs:  Other (decrease bs)


Cardiovascular:  S1


Abdomen:  Soft


Neuro Exam:  Alert


Extremities:  Other (1+edema)


Labs





Laboratory Tests








Test


 3/20/19


16:57 3/20/19


20:48 3/21/19


07:41 3/21/19


08:24


 


Glucose (Fingerstick)


 322 mg/dL


(70-99) 397 mg/dL


(70-99) 274 mg/dL


(70-99) 





 


Sodium Level


 


 


 


 136 mmol/L


(136-145)


 


Potassium Level


 


 


 


 3.9 mmol/L


(3.5-5.1)


 


Chloride Level


 


 


 


 97 mmol/L


()


 


Carbon Dioxide Level


 


 


 


 31 mmol/L


(21-32)


 


Anion Gap    8 (6-14) 


 


Blood Urea Nitrogen


 


 


 


 15 mg/dL


(8-26)


 


Creatinine


 


 


 


 1.1 mg/dL


(0.7-1.3)


 


Estimated GFR


(Cockcroft-Gault) 


 


 


 89.7 





 


Glucose Level


 


 


 


 293 mg/dL


(70-99)


 


Calcium Level


 


 


 


 8.2 mg/dL


(8.5-10.1)


 


Troponin I Quantitative


 


 


 


 < 0.017 ng/mL


(0.000-0.055)


 


Test


 3/21/19


11:37 3/21/19


16:52 3/21/19


21:00 





 


Glucose (Fingerstick)


 197 mg/dL


(70-99) 340 mg/dL


(70-99) 258 mg/dL


(70-99) 











Laboratory Tests








Test


 3/21/19


16:52 3/21/19


21:00


 


Glucose (Fingerstick)


 340 mg/dL


(70-99) 258 mg/dL


(70-99)








Medications





Active Scripts








 Medications  Dose


 Route/Sig


 Max Daily Dose Days Date Category


 


 Atorvastatin


 Calcium 40 Mg


 Tablet  40 Mg


 PO QHS


   3/20/19 Reported


 


 Sertraline Hcl


 100 Mg Tablet  100 Mg


 PO DAILY


   3/20/19 Reported


 


 Fluticasone


 Propionate Nasal


 Spray


  (Fluticasone


 Propionate) 16 Gm


 Spray.susp  2 Spr


 ASIM DAILY


   3/20/19 Reported


 


 Famotidine 40 Mg


 Tablet  40 Mg


 PO DAILY


   3/20/19 Reported


 


 Ibuprofen 800 Mg


 Tablet  1 Tab


 PO PRN Q6-8HRS PRN


   3/20/19 Reported


 


 Trazodone Hcl 100


 Mg Tablet  100 Mg


 PO PRN QHS PRN


   3/20/19 Reported


 


 Norco 5-325


 Tablet


  (Acetaminophen/Hydrocodone


 Bitart) 1 Each


 Tablet  1 Tab


 PO PRN Q6HRS PRN


  5 8/1/18 Rx


 


 Orphenadrine


 Citrate 100 Mg


 Tablet.er  1 Tab


 PO BID PRN


   8/1/18 Rx


 


 Naproxen 500 Mg


 Tablet  1 Tab


 PO BID PRN


   8/1/18 Rx


 


 Lantus Solostar


  (Insulin


 Glargine,Hum.rec.anlog)


 100 Unit/1 Ml


 Insuln.pen  30 Unit


 SQ BID


   3/28/16 Reported


 


 Amitriptyline Hcl


 50 Mg Tablet  2 Tab


 PO QHS


   1/25/16 Reported


 


 Novolog (Insulin


 Aspart) 100


 Unit/1 Ml


 Cartridge  50 Unit


 SQ TIDAC


   1/25/16 Reported


 


 Aspirin 81 Mg


 Tab.chew  1 Tab


 PO DAILY


   1/25/16 Reported


 


 Lisinopril 20 Mg


 Tablet  2 Tab


 PO DAILY


   1/25/16 Reported











Impression


.


1.  Dyspnea with acute hypoxic respiratory failure requiring BiPAP initially


.  He has mildly prominent interstitial markings.  He has cough


with green sputum production and subjective fever at home and has chest pain. 


The differential diagnosis would include the following.


A.  Acute viral pneumonitis.  Influenza screen, however, was negative.


B.  A 20-pound weight gain in last one month contributing to the patient's


symptoms.  Possible acute cor pulmonale.


C.  Chest pain, likely atypical, 


2.  Morbid obesity with suspected obstructive sleep apnea.  He says he was


diagnosed with sleep apnea, but his insurance has not approved CPAP.


3.  Underlying obesity hypoventilation syndrome.


4.  No significant history of tobacco use.


5.  Abnormal chest x-ray with prominent interstitial markings, likely viral


pneumonitis.





Plan


.





1.  Continue with present bronchodilators.


2.  Oral prednisone.


3.  Continue oral antibiotics.


4.  Neg venous Dopplers of lower extremities /CTA chest done . No contrast seen 

in [place]. Not a PE study. do not think clinically he has PE. no need for further 

testing.


5.  Weight loss is strongly emphasized to the patient.


6.  P.r.n.  BiPAP.


7.     Pt has gallstones. not symptomatic. Leave up to PCP











MOLLY DIANA MD Mar 22, 2019 11:40

## 2019-03-22 NOTE — PDOC3
Discharge Summary


Visit Information


Date of Admission:  Mar 20, 2019


Date of Discharge:  Mar 22, 2019


Admitting Diagnosis:  Asthma exacerbation


Final Diagnosis


Asthma exacerbation





Brief Hospital Course


Allergies





 Allergies








Coded Allergies Type Severity Reaction Last Updated Verified


 


  Sulfa (Sulfonamide Antibiotics) Allergy Intermediate  3/30/16 Yes


 


  adhesive tape Allergy Intermediate skin sensitive to tape 3/30/16 Yes








Vital Signs





Vital Signs








  Date Time  Temp Pulse Resp B/P (MAP) Pulse Ox O2 Delivery O2 Flow Rate FiO2


 


3/22/19 11:12     100 Room Air  


 


3/22/19 11:00 98.0 101 16 116/81 (93)    





 98.0       








Lab Results





Laboratory Tests








Test


 3/20/19


16:57 3/20/19


20:48 3/21/19


07:41 3/21/19


08:24


 


Glucose (Fingerstick)


 322 mg/dL


(70-99) 397 mg/dL


(70-99) 274 mg/dL


(70-99) 





 


Sodium Level


 


 


 


 136 mmol/L


(136-145)


 


Potassium Level


 


 


 


 3.9 mmol/L


(3.5-5.1)


 


Chloride Level


 


 


 


 97 mmol/L


()


 


Carbon Dioxide Level


 


 


 


 31 mmol/L


(21-32)


 


Anion Gap    8 (6-14) 


 


Blood Urea Nitrogen


 


 


 


 15 mg/dL


(8-26)


 


Creatinine


 


 


 


 1.1 mg/dL


(0.7-1.3)


 


Estimated GFR


(Cockcroft-Gault) 


 


 


 89.7 





 


Glucose Level


 


 


 


 293 mg/dL


(70-99)


 


Calcium Level


 


 


 


 8.2 mg/dL


(8.5-10.1)


 


Troponin I Quantitative


 


 


 


 < 0.017 ng/mL


(0.000-0.055)


 


Test


 3/21/19


11:37 3/21/19


16:52 3/21/19


21:00 3/22/19


08:02


 


Glucose (Fingerstick)


 197 mg/dL


(70-99) 340 mg/dL


(70-99) 258 mg/dL


(70-99) 143 mg/dL


(70-99)


 


Test


 3/22/19


10:52 


 


 





 


Glucose (Fingerstick)


 258 mg/dL


(70-99) 


 


 











Laboratory Tests








Test


 3/21/19


16:52 3/21/19


21:00 3/22/19


08:02 3/22/19


10:52


 


Glucose (Fingerstick)


 340 mg/dL


(70-99) 258 mg/dL


(70-99) 143 mg/dL


(70-99) 258 mg/dL


(70-99)








Brief Hospital Course


Mr Haq is a 38 year old Male patient w/ PMHx asthma, HTN, DM2, morbid 

obesity, and newly diagnosed sleep apnea recently who presented to ED coughing 

up yellow sputum having fevers having chest pain with wheezing and quite short 

of breath overall.


He has associated chest pain in center of the chest feeling like pressure but 

also worse with coughing and with palpation.


He denies any GI or  symptoms


3/21: CTPA negative for PE, not enough contrast, though. LE dopplers negative 

for DVT. Echo WNL (unable to visualize RV and valves, however)





Feeling a bit better today. Breathing improved. Unfortunately he cannot get 

BIPAP as he is on medicaid, apparently.





ECHO:


The left ventricular systolic function is normal and the ejection fraction is 

within normal range. The Ejection Fraction is 60-65%.


There is normal LV segmental wall motion.


Technically very difficult study despite contrast use. Unable to visualize 

majority of the RV and valves.





Greater than 30 minutes spent on discharge including prescriptions





A/P:


Acute asthma exacerbation - will cont steroids, nebs, add pulmicort. He does 

not have a primary pulmonologist, would like to see one while in house


Acute bronchitis - meets sepsis criteria with his RR and leukocytosis, covered 

with rocephin and doxy. Given IVF


Chest pain - EKG and trop negative, seems pleuritic. He states he has been 

gaining weight, Echo shows normal cardiac function


HTN - will continue home meds


DM2 - on 30u BID lantus and 35u TID novolog with frequent hypoglycemia, I will 

reduce his novolog dosing and add bolus plus sliding scale to 25U TID and high 

sliding


Morbid obesity - counseled on weight loss


Newly diagnosed sleep apnea - BIPAP 26/22 overnight. Unfortunately, apparently 

his insurance has not approved this





Discharge Information


Condition at Discharge:  Improved


Follow Up:  Weeks (2)


Disposition/Orders:  D/C to Home


Scheduled


Amitriptyline Hcl (Amitriptyline Hcl) 50 Mg Tablet, 2 TAB PO QHS for sleep, #30 

Ref 1 (Reported)


   Entered as Reported by: ELGIN VIZCARRA on 1/25/16 1612


   Last Action: Reviewed on 3/20/19 2126 by ANYI CASTILLO


Aspirin (Aspirin) 81 Mg Tab.chew, 1 TAB PO DAILY, #30 Ref 3 (Reported)


   Entered as Reported by: ELGIN VIZCARRA on 1/25/16 1611


   Last Action: Reviewed on 3/20/19 2126 by ANYI CASTILLO


Atorvastatin Calcium (Atorvastatin Calcium) 40 Mg Tablet, 40 MG PO QHS for 

cholesterol, (Reported)


   Entered as Reported by: ANYI CASTILLO on 3/20/19 2125


   Last Action: Continued on 3/21/19 1520 by GUDELIA SCHMID MD


Doxycycline Hyclate (Doxycycline Hyclate) 100 Mg Tablet, 100 MG PO BID for 

Pneumonia for 7 Days, #14


   Prescribed by: GUDELIA SCHMID MD on 3/22/19 1441


Famotidine (Famotidine) 40 Mg Tablet, 40 MG PO DAILY for indigestion, (Reported)


   Entered as Reported by: ANYI CASTILLO on 3/20/19 2125


   Last Action: Converted on 3/21/19 1520 by GUDELIA SCHMID MD


Fluticasone Propionate (Fluticasone Propionate Nasal Spray) 16 Gm [place].susp, 2 

SPR ASIM DAILY for sinuses, (Reported)


   Entered as Reported by: ANYI CASTILLO on 3/20/19 2125


   Last Action: Continued on 3/21/19 1520 by GUDELIA SCHMID MD


Insulin Aspart (Novolog) 100 Unit/1 Ml Cartridge, 50 UNIT SQ TIDAC for blood 

sugar, (Reported)


   Entered as Reported by: ELGIN VIZCARRA on 1/25/16 1612


   Last Action: Reviewed on 3/20/19 2126 by ANYI GREEN


Insulin Glargine,Hum.rec.anlog (Lantus Solostar) 100 Unit/1 Ml Insuln.pen, 30 

UNIT SQ BID, #15 Ref 3 (Reported)


   Entered as Reported by: RACHELE GRADY on 3/28/16 1834


   Last Action: Reviewed on 3/20/19 2126 by ANYI GREEN


Ipratropium/Albuterol Sulfate (Combivent Respimat Inhal) 4 Gm Aer.w.adap, 2 INH 

IH QID for Bronchitis for 30 Days, #1 Ref 2


   Prescribed by: GUDELIA SCHMID MD on 3/22/19 1441


Lisinopril (Lisinopril) 20 Mg Tablet, 2 TAB PO DAILY for blood pressure, #30 

Ref 5 (Reported)


   Entered as Reported by: ELGIN VIZCARRA on 1/25/16 1610


   Last Action: Reviewed on 3/20/19 2126 by ANYI CASTILLO


Prednisone (Prednisone) 20 Mg Tablet, 1 TAB PO DAILY for COPD, #5


   Prescribed by: GUDELIA SCHMID MD on 3/22/19 1441


Sertraline Hcl (Sertraline Hcl) 100 Mg Tablet, 100 MG PO DAILY for bipolar, (

Reported)


   Entered as Reported by: ANYI CASTILLO on 3/20/19 2125


   Last Action: Converted on 3/21/19 1520 by GUDELIA SCHMID MD





Scheduled PRN


Hydrocodone/Apap 5-325 (Norco 5-325 Tablet) 1 Each Tablet, 1 TAB PO PRN Q6HRS 

PRN for PAIN for 5 Days, #10 Ref 0


   Prescribed by: MEGHA WADDELL D.O. on 8/1/18 0354


   Last Action: Reviewed on 3/20/19 2126 by ANYI CASTILLO


Orphenadrine Citrate (Orphenadrine Citrate) 100 Mg Tablet.er, 1 TAB PO BID PRN 

for MUSCLE PAIN, #14 Ref 0


   Prescribed by: MEGHA WADDELL D.O. on 8/1/18 0354


   Last Action: HELD on 3/21/19 1519 by GUDELIA SCHMID MD


Trazodone Hcl (Trazodone Hcl) 100 Mg Tablet, 100 MG PO PRN QHS PRN for INSOMNIA,

 (Reported)


   Entered as Reported by: ANYI CASTILLO on 3/20/19 2125


   Last Action: Continued on 3/21/19 1520 by GUDELIA SCHMID MD





Discontinued Medications


Ibuprofen (Ibuprofen) 800 Mg Tablet, 1 TAB PO PRN Q6-8HRS PRN for PAIN, (

Reported)


   Entered as Reported by: ANYI CASTILLO on 3/20/19 2125


   Last Action: HELD on 3/21/19 1519 by GUDELIA SCHMID MD


Naproxen (Naproxen) 500 Mg Tablet, 1 TAB PO BID PRN for PAIN, #20 Ref 0


   Prescribed by: MEGHA WADDELL D.O. on 8/1/18 0354


   Last Action: HELD on 3/21/19 1519 by MD SHARMAINE LUGO CHRISTOPHER S MD Mar 22, 2019 14:52

## 2019-03-22 NOTE — NUR
pt discharged home with family.  meds and follow up reviewed.  pt given script for bipap and 
instructed to obtain compassionate care services through [facility] for assistance in 
obtaining device.  pt v/u. pt stable upon dc.

## 2019-03-22 NOTE — PDOC
PROGRESS NOTES


Chief Complaint


Chief Complaint


A/P:


Acute asthma exacerbation - will cont steroids, nebs, add pulmicort. He does 

not have a primary pulmonologist, would like to see one while in house


Acute bronchitis - meets sepsis criteria with his RR and leukocytosis, covered 

with rocephin and doxy. Given IVF


Chest pain - EKG and trop negative, seems pleuritic. He states he has been 

gaining weight, Echo shows normal cardiac function


HTN - will continue home meds


DM2 - on 30u BID lantus and 35u TID novolog with frequent hypoglycemia, I will 

reduce his novolog dosing and add bolus plus sliding scale to 25U TID and high 

sliding


Morbid obesity - counseled on weight loss


Newly diagnosed sleep apnea - BIPAP 26/22 overnight. Unfortunately, apparently 

his insurance has not approved this





FEN - ADA diet


PPX - SCDs


FULL CODE


Inpatient for acute asthma exacerbation, may have element of cor pulmonale, 

will consult pulm on his case. May need CT chest





History of Present Illness


History of Present Illness


Mr Haq is a 38 year old Male patient w/ PMHx asthma, HTN, DM2, morbid 

obesity, and newly diagnosed sleep apnea recently who presented to ED coughing 

up yellow sputum having fevers having chest pain with wheezing and quite short 

of breath overall.


He has associated chest pain in center of the chest feeling like pressure but 

also worse with coughing and with palpation.


He denies any GI or  symptoms


3/21: CTPA negative for PE, not enough contrast, though. LE dopplers negative 

for DVT. Echo WNL (unable to visualize RV and valves, however)





Feeling a bit better today. Breathing improved. Unfortunately he cannot get 

BIPAP as he is on medicaid, apparently.





ECHO:


The left ventricular systolic function is normal and the ejection fraction is 

within normal range. The Ejection Fraction is 60-65%.


There is normal LV segmental wall motion.


Technically very difficult study despite contrast use. Unable to visualize 

majority of the RV and valves.





Vitals


Vitals





Vital Signs








  Date Time  Temp Pulse Resp B/P (MAP) Pulse Ox O2 Delivery O2 Flow Rate FiO2


 


3/22/19 07:41     100 Room Air  


 


3/22/19 03:00 98.3 98 20 161/102 (121)    





 98.3       











Physical Exam


General:  Alert, Oriented X3, Cooperative, mild distress


Heart:  Regular rate, Normal S1, Normal S2


Lungs:  Other (Scattered bilateral wheezing)


Abdomen:  Normal bowel sounds, Soft


Extremities:  No clubbing, No cyanosis


Skin:  No rashes, No breakdown





Labs


LABS





Laboratory Tests








Test


 3/21/19


08:24 3/21/19


11:37 3/21/19


16:52 3/21/19


21:00


 


Sodium Level


 136 mmol/L


(136-145) 


 


 





 


Potassium Level


 3.9 mmol/L


(3.5-5.1) 


 


 





 


Chloride Level


 97 mmol/L


() 


 


 





 


Carbon Dioxide Level


 31 mmol/L


(21-32) 


 


 





 


Anion Gap 8 (6-14)    


 


Blood Urea Nitrogen


 15 mg/dL


(8-26) 


 


 





 


Creatinine


 1.1 mg/dL


(0.7-1.3) 


 


 





 


Estimated GFR


(Cockcroft-Gault) 89.7 


 


 


 





 


Glucose Level


 293 mg/dL


(70-99) 


 


 





 


Calcium Level


 8.2 mg/dL


(8.5-10.1) 


 


 





 


Troponin I Quantitative


 < 0.017 ng/mL


(0.000-0.055) 


 


 





 


Glucose (Fingerstick)


 


 197 mg/dL


(70-99) 340 mg/dL


(70-99) 258 mg/dL


(70-99)











Comment


Review of Relevant


I have reviewed the following items martha (where applicable) has been applied.


Labs





Laboratory Tests








Test


 3/20/19


11:28 3/20/19


16:57 3/20/19


20:48 3/21/19


07:41


 


Glucose (Fingerstick)


 459 mg/dL


(70-99) 322 mg/dL


(70-99) 397 mg/dL


(70-99) 274 mg/dL


(70-99)


 


Test


 3/21/19


08:24 3/21/19


11:37 3/21/19


16:52 3/21/19


21:00


 


Sodium Level


 136 mmol/L


(136-145) 


 


 





 


Potassium Level


 3.9 mmol/L


(3.5-5.1) 


 


 





 


Chloride Level


 97 mmol/L


() 


 


 





 


Carbon Dioxide Level


 31 mmol/L


(21-32) 


 


 





 


Anion Gap 8 (6-14)    


 


Blood Urea Nitrogen


 15 mg/dL


(8-26) 


 


 





 


Creatinine


 1.1 mg/dL


(0.7-1.3) 


 


 





 


Estimated GFR


(Cockcroft-Gault) 89.7 


 


 


 





 


Glucose Level


 293 mg/dL


(70-99) 


 


 





 


Calcium Level


 8.2 mg/dL


(8.5-10.1) 


 


 





 


Troponin I Quantitative


 < 0.017 ng/mL


(0.000-0.055) 


 


 





 


Glucose (Fingerstick)


 


 197 mg/dL


(70-99) 340 mg/dL


(70-99) 258 mg/dL


(70-99)








Laboratory Tests








Test


 3/21/19


08:24 3/21/19


11:37 3/21/19


16:52 3/21/19


21:00


 


Sodium Level


 136 mmol/L


(136-145) 


 


 





 


Potassium Level


 3.9 mmol/L


(3.5-5.1) 


 


 





 


Chloride Level


 97 mmol/L


() 


 


 





 


Carbon Dioxide Level


 31 mmol/L


(21-32) 


 


 





 


Anion Gap 8 (6-14)    


 


Blood Urea Nitrogen


 15 mg/dL


(8-26) 


 


 





 


Creatinine


 1.1 mg/dL


(0.7-1.3) 


 


 





 


Estimated GFR


(Cockcroft-Gault) 89.7 


 


 


 





 


Glucose Level


 293 mg/dL


(70-99) 


 


 





 


Calcium Level


 8.2 mg/dL


(8.5-10.1) 


 


 





 


Troponin I Quantitative


 < 0.017 ng/mL


(0.000-0.055) 


 


 





 


Glucose (Fingerstick)


 


 197 mg/dL


(70-99) 340 mg/dL


(70-99) 258 mg/dL


(70-99)








Microbiology


3/20/19 Blood Culture - Preliminary, Resulted


          NO GROWTH AFTER 2 DAYS


Medications





Current Medications


Albuterol Sulfate (Ventolin Neb Soln) 10 mg 1X  ONCE CONT NEB  Last 

administered on 3/20/19at 00:47;  Start 3/20/19 at 01:00;  Stop 3/20/19 at 01:01

;  Status DC


Methylprednisolone Sodium Succinate (SOLU-Medrol 125MG VIAL) 125 mg 1X  ONCE IV

  Last administered on 3/20/19at 01:13;  Start 3/20/19 at 01:00;  Stop 3/20/19 

at 01:01;  Status DC


Albuterol/ Ipratropium (Duoneb) 3 ml RTQID NEB  Last administered on 3/20/19at 

11:11;  Start 3/20/19 at 08:00;  Stop 3/20/19 at 15:10;  Status DC


Ceftriaxone Sodium (Rocephin) 1 gm 1X  ONCE IVP  Last administered on 3/20/19at 

02:33;  Start 3/20/19 at 02:30;  Stop 3/20/19 at 02:31;  Status DC


Doxycycline Hyclate (Vibra-Tab) 100 mg 1X  ONCE PO  Last administered on 3/20/

19at 02:33;  Start 3/20/19 at 02:30;  Stop 3/20/19 at 02:31;  Status DC


Amitriptyline HCl (Elavil) 50 mg QHS PO  Last administered on 3/21/19at 20:57;  

Start 3/20/19 at 21:00


Aspirin (Children'S Aspirin) 81 mg DAILY PO  Last administered on 3/21/19at 08:

42;  Start 3/21/19 at 09:00


Acetaminophen/ Hydrocodone Bitart (Lortab 5/325) 1 tab PRN Q6HRS  PRN PO PAIN 

Last administered on 3/21/19at 14:44;  Start 3/20/19 at 11:00


Insulin Glargine (Lantus) 30 units BID SQ  Last administered on 3/20/19at 21:30

;  Start 3/20/19 at 11:30;  Stop 3/21/19 at 07:59;  Status DC


Lisinopril (Prinivil) 20 mg DAILY PO  Last administered on 3/21/19at 08:41;  

Start 3/20/19 at 11:30


Non-Formulary Medication (Insulin Aspart (Novolog)) 10 unit TIDAC SQ ;  Start 3/

20/19 at 11:30;  Stop 3/20/19 at 11:30;  Status DC


Insulin Human Lispro (HumaLOG) 0-7 UNITS TIDWMEALS SQ  Last administered on 3/21

/19at 17:06;  Start 3/20/19 at 12:00


Dextrose (Dextrose 50%-Water Syringe) 12.5 gm PRN Q15MIN  PRN IV SEE COMMENTS;  

Start 3/20/19 at 11:00


Insulin Human Lispro (HumaLOG) 10 units TIDWMEALS SQ ;  Start 3/20/19 at 12:00;

  Stop 3/20/19 at 12:00;  Status DC


Insulin Human Lispro (HumaLOG) 25 units TIDWMEALS SQ  Last administered on 3/20/

19at 18:59;  Start 3/20/19 at 12:00;  Stop 3/21/19 at 08:00;  Status DC


Budesonide (Pulmicort) 0.5 mg RTBID NEB  Last administered on 3/22/19at 07:37;  

Start 3/20/19 at 20:00


Albuterol/ Ipratropium (Duoneb) 3 ml RTQID NEB  Last administered on 3/22/19at 

07:37;  Start 3/20/19 at 16:00


Doxycycline Hyclate (Vibra-Tab) 100 mg BID PO  Last administered on 3/21/19at 20

:57;  Start 3/20/19 at 15:30


Prednisone (Prednisone) 20 mg DAILY PO  Last administered on 3/21/19at 08:42;  

Start 3/21/19 at 09:00


Insulin Glargine (Lantus) 35 units BID SQ  Last administered on 3/21/19at 21:08

;  Start 3/21/19 at 09:00


Insulin Human Lispro (HumaLOG) 30 units TIDWMEALS SQ  Last administered on 3/21/

19at 17:06;  Start 3/21/19 at 08:00


Iohexol (Omnipaque 350 Mg/ml) 100 ml 1X  ONCE IV ;  Start 3/21/19 at 10:30;  

Stop 3/21/19 at 10:31;  Status DC


Info (CONTRAST GIVEN -- Rx MONITORING) 1 each PRN DAILY  PRN MC SEE COMMENTS;  

Start 3/21/19 at 10:30;  Stop 3/23/19 at 10:29


Perflutren Protein Type A Microsphe (Optison) 0.66 mg PRN 1X  PRN IV SEE 

COMMENTS Last administered on 3/21/19at 11:06;  Start 3/21/19 at 11:00;  Stop 3/

22/19 at 10:59


Perflutren Protein Type A Microsphe (Optison) 0.66 mg STK-MED ONCE IV ;  Start 3

/21/19 at 10:52;  Stop 3/21/19 at 10:53;  Status DC


Atorvastatin Calcium (Lipitor) 40 mg QHS PO  Last administered on 3/21/19at 20:

57;  Start 3/21/19 at 21:00


Fluticasone Propionate (Flonase) 2 spray DAILY NS ;  Start 3/22/19 at 09:00


Trazodone HCl (Desyrel) 100 mg PRN QHS  PRN PO INSOMNIA;  Start 3/21/19 at 15:30


Famotidine (Pepcid) 20 mg BID PO  Last administered on 3/21/19at 20:57;  Start 3

/21/19 at 21:00


Sertraline HCl (Zoloft) 100 mg DAILY PO  Last administered on 3/21/19at 16:28;  

Start 3/21/19 at 16:00





Active Scripts


Active


Norco 5-325 Tablet (Acetaminophen/Hydrocodone Bitart) 1 Each Tablet 1 Tab PO 

PRN Q6HRS PRN 5 Days


Orphenadrine Citrate 100 Mg Tablet.er 1 Tab PO BID PRN


Naproxen 500 Mg Tablet 1 Tab PO BID PRN


Reported


Atorvastatin Calcium 40 Mg Tablet 40 Mg PO QHS


Sertraline Hcl 100 Mg Tablet 100 Mg PO DAILY


Fluticasone Propionate Nasal Spray (Fluticasone Propionate) 16 Gm [place].susp 2 

Spr ASIM DAILY


Famotidine 40 Mg Tablet 40 Mg PO DAILY


Ibuprofen 800 Mg Tablet 1 Tab PO PRN Q6-8HRS PRN


Trazodone Hcl 100 Mg Tablet 100 Mg PO PRN QHS PRN


Lantus Solostar (Insulin Glargine,Hum.rec.anlog) 100 Unit/1 Ml Insuln.pen 30 

Unit SQ BID


Amitriptyline Hcl 50 Mg Tablet 2 Tab PO QHS


Novolog (Insulin Aspart) 100 Unit/1 Ml Cartridge 50 Unit SQ TIDAC


Aspirin 81 Mg Tab.chew 1 Tab PO DAILY


Lisinopril 20 Mg Tablet 2 Tab PO DAILY


Vitals/I & O





Vital Sign - Last 24 Hours








 3/21/19 3/21/19 3/21/19 3/21/19





 08:00 08:33 08:41 11:00


 


Temp    98.0





    98.0


 


Pulse   95 78


 


Resp    26


 


B/P (MAP)   173/98 118/77 (91)


 


Pulse Ox  96  95


 


O2 Delivery Room Air Room Air  Room Air


 


    





    





 3/21/19 3/21/19 3/21/19 3/21/19





 12:34 14:40 14:44 14:45


 


Pulse  124  108


 


Resp  20 20 24


 


B/P (MAP)  120/74 (89)  132/79 (96)


 


Pulse Ox  94  100


 


O2 Delivery Room Air Room Air  RT treatment





 3/21/19 3/21/19 3/21/19 3/21/19





 14:47 14:50 14:55 15:44


 


Pulse  109 108 


 


Resp  20 20 18


 


B/P (MAP)  144/86 (105) 126/65 (85) 


 


Pulse Ox 98 96 95 


 


O2 Delivery Room Air Room Air Room Air Room Air





 3/21/19 3/21/19 3/21/19 3/21/19





 19:00 20:07 20:10 23:00


 


Temp 97.6   98.4





 97.6   98.4


 


Pulse 103   101


 


Resp 20   20


 


B/P (MAP) 110/67 (81)   123/74 (90)


 


Pulse Ox 99  98 96


 


O2 Delivery Room Air Room Air Room Air Room Air


 


    





    





 3/22/19 3/22/19 3/22/19 3/22/19





 01:28 03:00 03:51 05:08


 


Temp  98.3  





  98.3  


 


Pulse  98  


 


Resp  20  


 


B/P (MAP)  161/102 (121)  


 


Pulse Ox  96  


 


O2 Delivery BiPAP/CPAP BiPAP/CPAP BiPAP/CPAP BiPAP/CPAP


 


    





    





 3/22/19   





 07:41   


 


Pulse Ox 100   


 


O2 Delivery Room Air   














Intake and Output   


 


 3/21/19 3/21/19 3/22/19





 15:00 23:00 07:00


 


Intake Total 240 ml 320 ml 500 ml


 


Balance 240 ml 320 ml 500 ml

















GUDELIA SCHMID MD Mar 22, 2019 07:58

## 2019-03-22 NOTE — NUR
SW following. Discussed with RN, pt is from home with wife. RN advised no SW needs and 
anticipates pt will discharge home today with self care.

## 2019-12-05 ENCOUNTER — HOSPITAL ENCOUNTER (EMERGENCY)
Dept: HOSPITAL 61 - ER | Age: 39
Discharge: HOME | End: 2019-12-05
Payer: MEDICAID

## 2019-12-05 VITALS — BODY MASS INDEX: 40.43 KG/M2 | HEIGHT: 74 IN | WEIGHT: 315 LBS

## 2019-12-05 VITALS — SYSTOLIC BLOOD PRESSURE: 164 MMHG | DIASTOLIC BLOOD PRESSURE: 86 MMHG

## 2019-12-05 DIAGNOSIS — M79.661: Primary | ICD-10-CM

## 2019-12-05 DIAGNOSIS — E11.40: ICD-10-CM

## 2019-12-05 DIAGNOSIS — Z88.2: ICD-10-CM

## 2019-12-05 DIAGNOSIS — R22.41: ICD-10-CM

## 2019-12-05 DIAGNOSIS — I10: ICD-10-CM

## 2019-12-05 DIAGNOSIS — J45.909: ICD-10-CM

## 2019-12-05 DIAGNOSIS — R06.02: ICD-10-CM

## 2019-12-05 DIAGNOSIS — E66.01: ICD-10-CM

## 2019-12-05 DIAGNOSIS — E78.00: ICD-10-CM

## 2019-12-05 DIAGNOSIS — R07.89: ICD-10-CM

## 2019-12-05 DIAGNOSIS — Z88.8: ICD-10-CM

## 2019-12-05 DIAGNOSIS — Z87.891: ICD-10-CM

## 2019-12-05 LAB
ALBUMIN SERPL-MCNC: 3 G/DL (ref 3.4–5)
ALBUMIN/GLOB SERPL: 0.7 {RATIO} (ref 1–1.7)
ALP SERPL-CCNC: 97 U/L (ref 46–116)
ALT SERPL-CCNC: 17 U/L (ref 16–63)
ANION GAP SERPL CALC-SCNC: 7 MMOL/L (ref 6–14)
APTT BLD: 29 SEC (ref 24–38)
APTT PPP: YELLOW S
AST SERPL-CCNC: 16 U/L (ref 15–37)
BACTERIA #/AREA URNS HPF: 0 /HPF
BASOPHILS # BLD AUTO: 0.1 X10^3/UL (ref 0–0.2)
BASOPHILS NFR BLD: 1 % (ref 0–3)
BILIRUB SERPL-MCNC: 0.1 MG/DL (ref 0.2–1)
BILIRUB UR QL STRIP: NEGATIVE
BUN SERPL-MCNC: 13 MG/DL (ref 8–26)
BUN/CREAT SERPL: 12 (ref 6–20)
CALCIUM SERPL-MCNC: 8.3 MG/DL (ref 8.5–10.1)
CHLORIDE SERPL-SCNC: 103 MMOL/L (ref 98–107)
CK SERPL-CCNC: 188 U/L (ref 39–308)
CO2 SERPL-SCNC: 28 MMOL/L (ref 21–32)
CREAT SERPL-MCNC: 1.1 MG/DL (ref 0.7–1.3)
D DIMER PPP FEU-MCNC: 0.55 UG/MLFEU (ref 0–0.5)
EOSINOPHIL NFR BLD: 0.4 X10^3/UL (ref 0–0.7)
EOSINOPHIL NFR BLD: 4 % (ref 0–3)
ERYTHROCYTE [DISTWIDTH] IN BLOOD BY AUTOMATED COUNT: 16.3 % (ref 11.5–14.5)
FIBRINOGEN PPP-MCNC: CLEAR MG/DL
GFR SERPLBLD BASED ON 1.73 SQ M-ARVRAT: 89.3 ML/MIN
GLOBULIN SER-MCNC: 4.4 G/DL (ref 2.2–3.8)
GLUCOSE SERPL-MCNC: 113 MG/DL (ref 70–99)
HCT VFR BLD CALC: 37 % (ref 39–53)
HGB BLD-MCNC: 11.9 G/DL (ref 13–17.5)
LYMPHOCYTES # BLD: 1.6 X10^3/UL (ref 1–4.8)
LYMPHOCYTES NFR BLD AUTO: 16 % (ref 24–48)
MAGNESIUM SERPL-MCNC: 2 MG/DL (ref 1.8–2.4)
MCH RBC QN AUTO: 25 PG (ref 25–35)
MCHC RBC AUTO-ENTMCNC: 32 G/DL (ref 31–37)
MCV RBC AUTO: 78 FL (ref 79–100)
MONO #: 0.9 X10^3/UL (ref 0–1.1)
MONOCYTES NFR BLD: 9 % (ref 0–9)
NEUT #: 7 X10^3/UL (ref 1.8–7.7)
NEUTROPHILS NFR BLD AUTO: 70 % (ref 31–73)
NITRITE UR QL STRIP: NEGATIVE
PH UR STRIP: 5.5 [PH]
PLATELET # BLD AUTO: 370 X10^3/UL (ref 140–400)
POTASSIUM SERPL-SCNC: 4.2 MMOL/L (ref 3.5–5.1)
PROT SERPL-MCNC: 7.4 G/DL (ref 6.4–8.2)
PROT UR STRIP-MCNC: NEGATIVE MG/DL
PROTHROMBIN TIME: 12.6 SEC (ref 11.7–14)
RBC # BLD AUTO: 4.72 X10^6/UL (ref 4.3–5.7)
RBC #/AREA URNS HPF: 0 /HPF (ref 0–2)
SODIUM SERPL-SCNC: 138 MMOL/L (ref 136–145)
SQUAMOUS #/AREA URNS LPF: (no result) /LPF
UROBILINOGEN UR-MCNC: 0.2 MG/DL
WBC # BLD AUTO: 10 X10^3/UL (ref 4–11)
WBC #/AREA URNS HPF: (no result) /HPF (ref 0–4)

## 2019-12-05 PROCEDURE — 80053 COMPREHEN METABOLIC PANEL: CPT

## 2019-12-05 PROCEDURE — 36415 COLL VENOUS BLD VENIPUNCTURE: CPT

## 2019-12-05 PROCEDURE — 93926 LOWER EXTREMITY STUDY: CPT

## 2019-12-05 PROCEDURE — 96374 THER/PROPH/DIAG INJ IV PUSH: CPT

## 2019-12-05 PROCEDURE — 93971 EXTREMITY STUDY: CPT

## 2019-12-05 PROCEDURE — 85730 THROMBOPLASTIN TIME PARTIAL: CPT

## 2019-12-05 PROCEDURE — 81001 URINALYSIS AUTO W/SCOPE: CPT

## 2019-12-05 PROCEDURE — 82553 CREATINE MB FRACTION: CPT

## 2019-12-05 PROCEDURE — 85025 COMPLETE CBC W/AUTO DIFF WBC: CPT

## 2019-12-05 PROCEDURE — 83735 ASSAY OF MAGNESIUM: CPT

## 2019-12-05 PROCEDURE — 85610 PROTHROMBIN TIME: CPT

## 2019-12-05 PROCEDURE — 93005 ELECTROCARDIOGRAM TRACING: CPT

## 2019-12-05 PROCEDURE — 84484 ASSAY OF TROPONIN QUANT: CPT

## 2019-12-05 PROCEDURE — 83880 ASSAY OF NATRIURETIC PEPTIDE: CPT

## 2019-12-05 PROCEDURE — 71275 CT ANGIOGRAPHY CHEST: CPT

## 2019-12-05 PROCEDURE — 99285 EMERGENCY DEPT VISIT HI MDM: CPT

## 2019-12-05 PROCEDURE — 85379 FIBRIN DEGRADATION QUANT: CPT

## 2019-12-05 NOTE — EKG
[facility]

              [address] [place] [address]

Test Date:    2019               Test Time:    10:14:44

Pat Name:     TERESA SAMSON         Department:   

Patient ID:   [id number]           Room:          

Gender:       M                        Technician:   

:          1980               Requested By: KATYA RODRIGUES

Order Number: [id number]           Reading MD:     

                                 Measurements

Intervals                              Axis          

Rate:         90                       P:            45

WI:           158                      QRS:          13

QRSD:         94                       T:            46

QT:           350                                    

QTc:          432                                    

                           Interpretive Statements

SINUS RHYTHM

NORMAL ECG

RI6.01

No previous ECG available for comparison

## 2019-12-05 NOTE — RAD
CT chest with contrast dated 12/5/2019.

 

No comparison available.

 

Clinical data indication: Elevated d-dimer. Shortness of breath and chest 

pain.

 

TECHNIQUE:

 

Contiguous axial imaging the chest performed following the intravenous 

demonstration of 100 cc Omnipaque 350. Study was performed as dedicated PE

protocol with thin cut coronal MIPS 3-D reconstruction. 

One or more of the following individualized dose reduction techniques were

utilized for this examination:  

1. Automated exposure control  

2. Adjustment of the mA and/or kV according to patient size  

3. Use of iterative reconstruction technique

 

FINDINGS:

 

Study is limited due to missed timing of contrast bolus. There is no 

evidence of central, main lobar or proximal segmental pulmonary embolus. 

The distal segmental and subsegmental branches are not well evaluated.

 

Heart size upper limits of normal. No pericardial effusion. No 

mediastinal, hilar or axillary lymphadenopathy.

 

Central airways are patent. Lungs are clear. No consolidation or pleural 

effusion. No pneumothorax.

 

Images of the upper abdomen show calcified stones in the gallbladder 

lumen. No acute bony abnormality. Multilevel spondylosis.

 

IMPRESSION:

1. Limited exam. No evidence of central, main lobar or proximal segmental 

pulmonary embolus. The distal segmental and subsegmental branches are not 

well evaluated due to missed timing of contrast bolus.

2. Clear lungs.

3. Cholelithiasis.

 

Electronically signed by: Richard Rosen MD (12/5/2019 12:38 PM) 

[id number]

## 2019-12-05 NOTE — RAD
EXAM: Right lower extremity venous Doppler sonogram; right lower extremity

arterial Doppler sonogram.

 

HISTORY: Pain and swelling.

 

TECHNIQUE: Gray scale and color Doppler sonographic evaluation of the 

right lower extremity veins and arteries with spectral waveform analysis 

was performed.

 

FINDINGS: There are triphasic and biphasic waveforms there are normal peak

systolic velocities throughout the right lower extremity arteries, with 

exception of the right peroneal artery which is not seen.

 

There is normal color flow, normal compressibility and there are normal 

spectral waveforms in the common femoral, superficial femoral, popliteal, 

and posterior tibial veins. The peroneal veins are not seen. There is soft

tissue edema.

 

IMPRESSION: 

1. Limited exam due to patient body habitus and soft tissue edema. The 

right peroneal artery and veins are not seen.

2. No Doppler evidence of lower extremity deep venous thrombosis or 

significant stenosis involving the visualized veins and arteries.

 

Electronically signed by: Katheryn Martinez MD (12/5/2019 11:41 AM) [id number]

## 2019-12-05 NOTE — PHYS DOC
Past Medical History


Past Medical History:  Asthma, Diabetes-Type II, High Cholesterol, Hypertension,

Other


Additional Past Medical Histor:  Morbid obesity, neuropathy


Past Surgical History:  Other


Additional Past Surgical Histo:  L SHOULDER ORIF


Alcohol Use:  Occasionally


Drug Use:  Marijuana





Adult General


Chief Complaint


Chief Complaint:  LOWER EXTREMITY SWELLING





HPI


HPI





Patient is a 39  year old AA male who presents to the emergency Department today

with complaints of right lower leg swelling and pain for the last 2 days. 

Patient states that his leg feels cool to touch this morning he denies any 

drainage or known injury. He also complains of chest pain and shortness of br

eath for the last 2 days. Patient states that he has been diaphoretic and 

nauseated with the chest pain. He denies any recent cough, vomiting, diarrhea, 

abdominal pain, back pain,recent decrease in mobility, recent travel, or recent 

air flights. Patient states that his pain as a 10 out of 10 on the pain scale, 

he denies any alleviating or exacerbating factors. He reports that he quit 

smoking cigarettes 3 years ago, he denies alcohol use, and states that he does 

not routinely use illicit drugs he only smokes marijuana on occasion. He denies 

any family cardiac history.





Review of Systems


Review of Systems





Constitutional: Denies fever or chills []


Eyes: Denies change in visual acuity, redness, or eye pain []


HENT: Denies nasal congestion or sore throat []


Respiratory: Denies cough, see history of present illness


Cardiovascular: Denies palpitations, No additional information not addressed in 

HPI []


GI: Denies abdominal pain, vomiting, bloody stools or diarrhea; see history of 

present illness[]


: Denies dysuria or hematuria []


Musculoskeletal: Denies back pain or joint pain []


Integument: Reports bruise to left hand and discoloration of right lower 

extremity with swelling of right lower extremity


Neurologic: Denies headache, focal weakness or sensory changes []


Endocrine: Denies polyuria or polydipsia []





Complete systems were reviewed and found to be within normal limits, except as 

documented in this note.





Current Medications


Current Medications





Current Medications








 Medications


  (Trade)  Dose


 Ordered  Sig/Pratik  Start Time


 Stop Time Status Last Admin


Dose Admin


 


 Aspirin


  (Alex Aspirin)  325 mg  1X  ONCE  12/5/19 10:30


 12/5/19 10:31 DC 12/5/19 10:37


325 MG


 


 Info


  (CONTRAST GIVEN


 -- Rx MONITORING)  1 each  PRN DAILY  PRN  12/5/19 12:15


 12/5/19 13:50 DC  





 


 Iohexol


  (Omnipaque 350


 Mg/ml)  100 ml  1X  ONCE  12/5/19 12:15


 12/5/19 12:16 DC 12/5/19 12:32


100 ML


 


 Morphine Sulfate


  (Morphine


 Sulfate)  4 mg  1X  ONCE  12/5/19 10:30


 12/5/19 10:31 DC 12/5/19 10:38


4 MG











Allergies


Allergies





Allergies








Coded Allergies Type Severity Reaction Last Updated Verified


 


  Sulfa (Sulfonamide Antibiotics) Allergy Intermediate  3/30/16 Yes


 


  adhesive tape Allergy Intermediate skin sensitive to tape 3/30/16 Yes











Physical Exam


Physical Exam





Constitutional: Well developed, well nourished, no acute distress, non-toxic 

appearance, obese []


HENT: Normocephalic, atraumatic, bilateral external ears normal, oropharynx 

moist, no oral exudates, nose normal. []


Eyes: PERRLA, EOMI, conjunctiva normal, no discharge. [] 


Neck: Normal range of motion, no stridor. [] 


Cardiovascular:Heart rate regular rhythm, no murmur []


Lungs & Thorax:  Bilateral breath sounds clear to auscultation, retractions, 

regular rate []


Abdomen: Bowel sounds normal, soft, no tenderness, no masses, no pulsatile 

masses. [] 


Skin: Warm, dry, no erythema, no rash; darkening of RLE, RLE 2+ edema and 

coolness to the touch; 1 cm bruise noted to L palm. [] 


Back: No tenderness


Extremities: RLE: TTP,  2+ edema, no deformity, weak pedal pulse; LLE: 

nontender, no cyanosis, no clubbing, ROM intact, 1+ edema. [] 


Neurologic: Alert and oriented X 3, no focal deficits noted. []


Psychologic: Affect normal, judgement normal, mood normal. []





Current Patient Data


Vital Signs





                                   Vital Signs








  Date Time  Temp Pulse Resp B/P (MAP) Pulse Ox O2 Delivery O2 Flow Rate FiO2


 


12/5/19 13:28  86 21 164/86 (112) 96 Room Air  


 


12/5/19 10:04 98.4       





 98.4       








Lab Values





                                Laboratory Tests








Test


 12/5/19


10:31 12/5/19


13:11


 


White Blood Count


 10.0 x10^3/uL


(4.0-11.0) 





 


Red Blood Count


 4.72 x10^6/uL


(4.30-5.70) 





 


Hemoglobin


 11.9 g/dL


(13.0-17.5)  L 





 


Hematocrit


 37.0 %


(39.0-53.0)  L 





 


Mean Corpuscular Volume


 78 fL ()


L 





 


Mean Corpuscular Hemoglobin 25 pg (25-35)   


 


Mean Corpuscular Hemoglobin


Concent 32 g/dL


(31-37) 





 


Red Cell Distribution Width


 16.3 %


(11.5-14.5)  H 





 


Platelet Count


 370 x10^3/uL


(140-400) 





 


Neutrophils (%) (Auto) 70 % (31-73)   


 


Lymphocytes (%) (Auto) 16 % (24-48)  L 


 


Monocytes (%) (Auto) 9 % (0-9)   


 


Eosinophils (%) (Auto) 4 % (0-3)  H 


 


Basophils (%) (Auto) 1 % (0-3)   


 


Neutrophils # (Auto)


 7.0 x10^3/uL


(1.8-7.7) 





 


Lymphocytes # (Auto)


 1.6 x10^3/uL


(1.0-4.8) 





 


Monocytes # (Auto)


 0.9 x10^3/uL


(0.0-1.1) 





 


Eosinophils # (Auto)


 0.4 x10^3/uL


(0.0-0.7) 





 


Basophils # (Auto)


 0.1 x10^3/uL


(0.0-0.2) 





 


Prothrombin Time


 12.6 SEC


(11.7-14.0) 





 


Prothrombin Time INR 1.0 (0.8-1.1)   


 


Activated Partial


Thromboplast Time 29 SEC (24-38)


 





 


D-Dimer ([place])


 0.55 ug/mlFEU


(0.00-0.50)  H 





 


Sodium Level


 138 mmol/L


(136-145) 





 


Potassium Level


 4.2 mmol/L


(3.5-5.1) 





 


Chloride Level


 103 mmol/L


() 





 


Carbon Dioxide Level


 28 mmol/L


(21-32) 





 


Anion Gap 7 (6-14)   


 


Blood Urea Nitrogen


 13 mg/dL


(8-26) 





 


Creatinine


 1.1 mg/dL


(0.7-1.3) 





 


Estimated GFR


(Cockcroft-Gault) 89.3  


 





 


BUN/Creatinine Ratio 12 (6-20)   


 


Glucose Level


 113 mg/dL


(70-99)  H 





 


Calcium Level


 8.3 mg/dL


(8.5-10.1)  L 





 


Magnesium Level


 2.0 mg/dL


(1.8-2.4) 





 


Total Bilirubin


 0.1 mg/dL


(0.2-1.0)  L 





 


Aspartate Amino Transferase


(AST) 16 U/L (15-37)


 





 


Alanine Aminotransferase (ALT)


 17 U/L (16-63)


 





 


Alkaline Phosphatase


 97 U/L


() 





 


Creatine Kinase


 188 U/L


() 





 


Creatine Kinase MB (Mass)


 3.2 ng/mL


(0.0-3.6) 





 


Creatine Kinase MB Relative


Index 1.7 % (0-4)  


 





 


Troponin I Quantitative


 < 0.017 ng/mL


(0.000-0.055) 





 


NT-Pro-B-Type Natriuretic


Peptide 269 pg/mL


(0-124)  H 





 


Total Protein


 7.4 g/dL


(6.4-8.2) 





 


Albumin


 3.0 g/dL


(3.4-5.0)  L 





 


Albumin/Globulin Ratio


 0.7 (1.0-1.7)


L 





 


Urine Collection Type  Void  


 


Urine Color  Yellow  


 


Urine Clarity  Clear  


 


Urine pH  5.5  


 


Urine Specific Gravity  1.020  


 


Urine Protein


 


 Negative mg/dL


(NEG-TRACE)


 


Urine Glucose (UA)


 


 Negative mg/dL


(NEG)


 


Urine Ketones (Stick)


 


 Negative mg/dL


(NEG)


 


Urine Blood


 


 Negative (NEG)





 


Urine Nitrite


 


 Negative (NEG)





 


Urine Bilirubin


 


 Negative (NEG)





 


Urine Urobilinogen Dipstick


 


 0.2 mg/dL (0.2


mg/dL)


 


Urine Leukocyte Esterase


 


 Negative (NEG)





 


Urine RBC  0 /HPF (0-2)  


 


Urine WBC


 


 1-4 /HPF (0-4)





 


Urine Squamous Epithelial


Cells 


 Occ /LPF  





 


Urine Bacteria


 


 0 /HPF (0-FEW)





 


Urine Mucus  Slight /LPF  





                                Laboratory Tests


12/5/19 10:31








                                Laboratory Tests


12/5/19 10:31














EKG


EKG


1014- NSR rate 90, no STEMI read by Dr. Hester[]





Radiology/Procedures


Radiology/Procedures


PROCEDURE: DUPLEX LOWER EX ARTERIAL RIGHT





 


EXAM: Right lower extremity venous Doppler sonogram; right lower extremity


arterial Doppler sonogram.


 


HISTORY: Pain and swelling.


 


TECHNIQUE: Gray scale and color Doppler sonographic evaluation of the 


right lower extremity veins and arteries with spectral waveform analysis 


was performed.


 


FINDINGS: There are triphasic and biphasic waveforms there are normal peak


systolic velocities throughout the right lower extremity arteries, with 


exception of the right peroneal artery which is not seen.


 


There is normal color flow, normal compressibility and there are normal 


spectral waveforms in the common femoral, superficial femoral, popliteal, 


and posterior tibial veins. The peroneal veins are not seen. There is soft


tissue edema.


 


IMPRESSION: 


1. Limited exam due to patient body habitus and soft tissue edema. The 


right peroneal artery and veins are not seen.


2. No Doppler evidence of lower extremity deep venous thrombosis or 


significant stenosis involving the visualized veins and arteries.[]





PROCEDURE: CT ANGIOGRAPHY CHEST





CT chest with contrast dated 12/5/2019.


 


No comparison available.


 


Clinical data indication: Elevated d-dimer. Shortness of breath and chest 


pain.


 


TECHNIQUE:


 


Contiguous axial imaging the chest performed following the intravenous 


demonstration of 100 cc Omnipaque 350. Study was performed as dedicated PE


protocol with thin cut coronal MIPS 3-D reconstruction. 


One or more of the following individualized dose reduction techniques were


utilized for this examination:  


1. Automated exposure control  


2. Adjustment of the mA and/or kV according to patient size  


3. Use of iterative reconstruction technique


 


FINDINGS:


 


Study is limited due to missed timing of contrast bolus. There is no 


evidence of central, main lobar or proximal segmental pulmonary embolus. 


The distal segmental and subsegmental branches are not well evaluated.


 


Heart size upper limits of normal. No pericardial effusion. No 


mediastinal, hilar or axillary lymphadenopathy.


 


Central airways are patent. Lungs are clear. No consolidation or pleural 


effusion. No pneumothorax.


 


Images of the upper abdomen show calcified stones in the gallbladder 


lumen. No acute bony abnormality. Multilevel spondylosis.


 


IMPRESSION:


1. Limited exam. No evidence of central, main lobar or proximal segmental 


pulmonary embolus. The distal segmental and subsegmental branches are not 


well evaluated due to missed timing of contrast bolus.


2. Clear lungs.


3. Cholelithiasis.





Course & Med Decision Making


Course & Med Decision Making


Pertinent Labs and Imaging studies reviewed. (See chart for details)


Patient is a 39-year-old -American male who presented to the emergency 

department with complaints of right lower leg swelling, coolness, and pain for 

the last 2 days in addition to chest pain and shortness of breath for the last 2

 days. His heart score was a 3, low suspicion for acute coronary syndrome. 


C revealed a hemoglobin of 11.9, hematocrit of 37, patient reported history of 

anemia, he denied any blood in his stools or urine. PT and INR were within 

normal limits, d-dimer was 0.55, CMP: [place] 113, calcium 8.3, , CK-MB 

negative, CK MB index also negative, troponin less than 0.017; UA revealed white

 blood cell count of 1-4, patient was asymptomatic. Patient's vital signs were 

stable throughout his stay, patient was noted to be hypertensive his highest 

blood pressure was 170/96, the lowest blood pressure was 163/77. Patient 

reported a history of hypertension. 


The ultrasound of his right lower extremity was negative for stenosis or a DVT, 

CT chest revealed no pulmonary embolism


The patient was given 325 mg of aspirin, and 4 mg of morphine in the emergency 

department.


Patient was encouraged to follow up with his primary care doctor for further 

evaluation of his extremity swelling, recommended elevation of legs when 

resting. Return to the ER if symptoms worsen.


The patient verbalized an understanding of home care, medications, follow-up, 

and return to ED instructions and was in agreement with the plan of care.


[]





Dragon Disclaimer


Dragon Disclaimer


This electronic medical record was generated, in whole or in part, using a voice

 recognition dictation system.





Departure


Departure


Impression:  


   Primary Impression:  


   Pain and swelling of right lower extremity


   Additional Impressions:  


   Chest pain of uncertain etiology


   Shortness of breath


Disposition:  01 HOME, SELF-CARE


Condition:  STABLE


Referrals:  


NO PCP (PCP)


Patient Instructions:  Chest Pain (Nonspecific), Easy-to-Read, Peripheral Edema,

 Shortness of Breath, Easy-to-Read





Additional Instructions:  


Your arterial and venous ultrasounds are negative today. EKG revealed no acute 

findings. Follow-up with your primary care doctor for further evaluation and 

treatment of the swelling in your lower extremities. Return to the ER if your 

symptoms worsen.





The HEART Score for CP Pts


HEART Score for Chest Pain:  








HEART Score for Chest Pain Response (Comments) Value


 


History Slighlty/Non-Suspicious 0


 


ECG Normal 0


 


Age >45 - < 65 1


 


Risk Factors >3 Risk Factors or Hx CAD 2


 


Troponin < Normal Limit 0


 


Total  3








Risk Factors:


Risk Factors:  DM, Current or recent (<one month) smoker, HTN, HLP, family 

history of CAD, obesity.


Risk Scores:


Score 0 - 3:  2.5% MACE over next 6 weeks - Discharge Home


Score 4 - 6:  20.3% MACE over next 6 weeks - Admit for Clinical Observation


Score 7 - 10:  72.7% MACE over next 6 weeks - Early Invasive Strategies





Problem Qualifiers











KATYA RODRIGUES        Dec 5, 2019 12:02

## 2021-01-04 ENCOUNTER — HOSPITAL ENCOUNTER (INPATIENT)
Dept: HOSPITAL 61 - ER | Age: 41
LOS: 1 days | Discharge: HOME | DRG: 871 | End: 2021-01-05
Attending: INTERNAL MEDICINE | Admitting: INTERNAL MEDICINE
Payer: MEDICAID

## 2021-01-04 VITALS — DIASTOLIC BLOOD PRESSURE: 92 MMHG | SYSTOLIC BLOOD PRESSURE: 203 MMHG

## 2021-01-04 VITALS — SYSTOLIC BLOOD PRESSURE: 116 MMHG | DIASTOLIC BLOOD PRESSURE: 92 MMHG

## 2021-01-04 VITALS — WEIGHT: 315 LBS | HEIGHT: 74 IN | BODY MASS INDEX: 40.43 KG/M2

## 2021-01-04 VITALS — DIASTOLIC BLOOD PRESSURE: 93 MMHG | SYSTOLIC BLOOD PRESSURE: 168 MMHG

## 2021-01-04 VITALS — DIASTOLIC BLOOD PRESSURE: 101 MMHG | SYSTOLIC BLOOD PRESSURE: 187 MMHG

## 2021-01-04 VITALS — SYSTOLIC BLOOD PRESSURE: 161 MMHG | DIASTOLIC BLOOD PRESSURE: 89 MMHG

## 2021-01-04 DIAGNOSIS — E78.00: ICD-10-CM

## 2021-01-04 DIAGNOSIS — E11.649: ICD-10-CM

## 2021-01-04 DIAGNOSIS — Z88.2: ICD-10-CM

## 2021-01-04 DIAGNOSIS — Z87.891: ICD-10-CM

## 2021-01-04 DIAGNOSIS — E78.5: ICD-10-CM

## 2021-01-04 DIAGNOSIS — E66.01: ICD-10-CM

## 2021-01-04 DIAGNOSIS — Z20.822: ICD-10-CM

## 2021-01-04 DIAGNOSIS — J45.901: ICD-10-CM

## 2021-01-04 DIAGNOSIS — I10: ICD-10-CM

## 2021-01-04 DIAGNOSIS — A41.9: Primary | ICD-10-CM

## 2021-01-04 DIAGNOSIS — Z91.048: ICD-10-CM

## 2021-01-04 DIAGNOSIS — E11.40: ICD-10-CM

## 2021-01-04 DIAGNOSIS — J18.9: ICD-10-CM

## 2021-01-04 DIAGNOSIS — J20.9: ICD-10-CM

## 2021-01-04 DIAGNOSIS — Z82.49: ICD-10-CM

## 2021-01-04 DIAGNOSIS — G47.00: ICD-10-CM

## 2021-01-04 DIAGNOSIS — G47.33: ICD-10-CM

## 2021-01-04 DIAGNOSIS — F31.9: ICD-10-CM

## 2021-01-04 DIAGNOSIS — Z83.3: ICD-10-CM

## 2021-01-04 DIAGNOSIS — J44.0: ICD-10-CM

## 2021-01-04 LAB
ALBUMIN SERPL-MCNC: 3.2 G/DL (ref 3.4–5)
ALBUMIN/GLOB SERPL: 0.8 {RATIO} (ref 1–1.7)
ALP SERPL-CCNC: 99 U/L (ref 46–116)
ALT SERPL-CCNC: 30 U/L (ref 16–63)
ANION GAP SERPL CALC-SCNC: 15 MMOL/L (ref 6–14)
AST SERPL-CCNC: 19 U/L (ref 15–37)
BASOPHILS # BLD AUTO: 0.1 X10^3/UL (ref 0–0.2)
BASOPHILS NFR BLD: 1 % (ref 0–3)
BILIRUB SERPL-MCNC: 0.3 MG/DL (ref 0.2–1)
BUN SERPL-MCNC: 19 MG/DL (ref 8–26)
BUN/CREAT SERPL: 17 (ref 6–20)
CALCIUM SERPL-MCNC: 8.8 MG/DL (ref 8.5–10.1)
CHLORIDE SERPL-SCNC: 103 MMOL/L (ref 98–107)
CO2 SERPL-SCNC: 25 MMOL/L (ref 21–32)
CREAT SERPL-MCNC: 1.1 MG/DL (ref 0.7–1.3)
EOSINOPHIL NFR BLD: 0.3 X10^3/UL (ref 0–0.7)
EOSINOPHIL NFR BLD: 2 % (ref 0–3)
ERYTHROCYTE [DISTWIDTH] IN BLOOD BY AUTOMATED COUNT: 14.9 % (ref 11.5–14.5)
GFR SERPLBLD BASED ON 1.73 SQ M-ARVRAT: 88.8 ML/MIN
GLUCOSE SERPL-MCNC: 130 MG/DL (ref 70–99)
HCT VFR BLD CALC: 39.9 % (ref 39–53)
HGB BLD-MCNC: 12.8 G/DL (ref 13–17.5)
INFLUENZA A PATIENT: NEGATIVE
INFLUENZA B PATIENT: NEGATIVE
LYMPHOCYTES # BLD: 1.9 X10^3/UL (ref 1–4.8)
LYMPHOCYTES NFR BLD AUTO: 13 % (ref 24–48)
MCH RBC QN AUTO: 25 PG (ref 25–35)
MCHC RBC AUTO-ENTMCNC: 32 G/DL (ref 31–37)
MCV RBC AUTO: 77 FL (ref 79–100)
MONO #: 1.2 X10^3/UL (ref 0–1.1)
MONOCYTES NFR BLD: 8 % (ref 0–9)
NEUT #: 11.3 X10^3/UL (ref 1.8–7.7)
NEUTROPHILS NFR BLD AUTO: 77 % (ref 31–73)
PLATELET # BLD AUTO: 389 X10^3/UL (ref 140–400)
POTASSIUM SERPL-SCNC: 4 MMOL/L (ref 3.5–5.1)
PROT SERPL-MCNC: 7.1 G/DL (ref 6.4–8.2)
RBC # BLD AUTO: 5.18 X10^6/UL (ref 4.3–5.7)
SODIUM SERPL-SCNC: 143 MMOL/L (ref 136–145)
WBC # BLD AUTO: 14.8 X10^3/UL (ref 4–11)

## 2021-01-04 PROCEDURE — 87804 INFLUENZA ASSAY W/OPTIC: CPT

## 2021-01-04 PROCEDURE — 82962 GLUCOSE BLOOD TEST: CPT

## 2021-01-04 PROCEDURE — 87040 BLOOD CULTURE FOR BACTERIA: CPT

## 2021-01-04 PROCEDURE — 36415 COLL VENOUS BLD VENIPUNCTURE: CPT

## 2021-01-04 PROCEDURE — 96374 THER/PROPH/DIAG INJ IV PUSH: CPT

## 2021-01-04 PROCEDURE — 80048 BASIC METABOLIC PNL TOTAL CA: CPT

## 2021-01-04 PROCEDURE — G0378 HOSPITAL OBSERVATION PER HR: HCPCS

## 2021-01-04 PROCEDURE — 83880 ASSAY OF NATRIURETIC PEPTIDE: CPT

## 2021-01-04 PROCEDURE — 80053 COMPREHEN METABOLIC PANEL: CPT

## 2021-01-04 PROCEDURE — 87186 SC STD MICRODIL/AGAR DIL: CPT

## 2021-01-04 PROCEDURE — 87205 SMEAR GRAM STAIN: CPT

## 2021-01-04 PROCEDURE — 99285 EMERGENCY DEPT VISIT HI MDM: CPT

## 2021-01-04 PROCEDURE — 83605 ASSAY OF LACTIC ACID: CPT

## 2021-01-04 PROCEDURE — 85025 COMPLETE CBC W/AUTO DIFF WBC: CPT

## 2021-01-04 PROCEDURE — 71045 X-RAY EXAM CHEST 1 VIEW: CPT

## 2021-01-04 PROCEDURE — 96375 TX/PRO/DX INJ NEW DRUG ADDON: CPT

## 2021-01-04 PROCEDURE — 84484 ASSAY OF TROPONIN QUANT: CPT

## 2021-01-04 PROCEDURE — U0003 INFECTIOUS AGENT DETECTION BY NUCLEIC ACID (DNA OR RNA); SEVERE ACUTE RESPIRATORY SYNDROME CORONAVIRUS 2 (SARS-COV-2) (CORONAVIRUS DISEASE [COVID-19]), AMPLIFIED PROBE TECHNIQUE, MAKING USE OF HIGH THROUGHPUT TECHNOLOGIES AS DESCRIBED BY CMS-2020-01-R: HCPCS

## 2021-01-04 PROCEDURE — 93005 ELECTROCARDIOGRAM TRACING: CPT

## 2021-01-04 PROCEDURE — 87077 CULTURE AEROBIC IDENTIFY: CPT

## 2021-01-04 RX ADMIN — FLUTICASONE FUROATE AND VILANTEROL TRIFENATATE SCH PUFF: 200; 25 POWDER RESPIRATORY (INHALATION) at 10:35

## 2021-01-04 RX ADMIN — HYDROCODONE BITARTRATE AND ACETAMINOPHEN PRN TAB: 5; 325 TABLET ORAL at 22:19

## 2021-01-04 RX ADMIN — INSULIN LISPRO SCH UNITS: 100 INJECTION, SOLUTION INTRAVENOUS; SUBCUTANEOUS at 17:29

## 2021-01-04 RX ADMIN — LABETALOL HYDROCHLORIDE PRN MG: 5 INJECTION, SOLUTION INTRAVENOUS at 17:19

## 2021-01-04 RX ADMIN — ENOXAPARIN SODIUM SCH MG: 100 INJECTION SUBCUTANEOUS at 22:10

## 2021-01-04 RX ADMIN — INSULIN LISPRO SCH UNITS: 100 INJECTION, SOLUTION INTRAVENOUS; SUBCUTANEOUS at 17:28

## 2021-01-04 RX ADMIN — DOXYCYCLINE HYCLATE SCH MG: 100 TABLET, COATED ORAL at 22:08

## 2021-01-04 NOTE — PDOC1
History and Physical


Date of Admission


Date of Admission


DATE: 1/4/21 


TIME: 07:25





Identification/Chief Complaint


Chief Complaint


Cough





Source


Source:  Patient





History of Present Illness


History of Present Illness


Mr Haq is a 38 year old Male patient w/ PMHx asthma, HTN, DM2, morbid 

obesity, and newly diagnosed sleep apnea recently who presented to ED coughing 

up yellow sputum having fevers with wheezing and quite short of breath which has

been progressive for the past 3 days prior to presentation.


He has associated chest pain in center of the chest feeling like pressure but 

also worse with coughing and with palpation.


Also c/o hot and cold flashes, and N/V x1 day.


Pt unaware of any recent covid exposure.


He denies any  symptoms


EKG Sinus rhythm no ST elevation no ST depression no acute MI.


Chest radiograph with bilateral lower lobe haziness.


Labs with WBC 14.8, Hb 12.8, platelets 389, Na 143, K 4, BUN 19, Cr 1.1, glucose

130. Rapid influenza negative. Troponin negative


Admitted for further care.





Past Medical History


Cardiovascular:  HTN


Pulmonary:  Asthma, Bronchitis


GI:  No pertinent hx


Heme/Onc:  No pertinent hx


Hepatobiliary:  No pertinent hx


Psych:  No pertinent hx


Rheumatologic:  No pertinent hx


Infectious disease:  No pertinent hx


Renal/:  No pertinent hx


Endocrine:  Diabetes





Past Surgical History


Past Surgical History:  No pertinent history





Family History


Family History:  Diabetes, High Cholestrol, Hypertension





Social History


Smoke:  No


ALCOHOL:  none


Drugs:  None





Current Problem List


Problem List


Problems


Medical Problems:


(1) Person under investigation for COVID-19


Status: Acute  





(2) Pneumonia


Status: Acute  











Current Medications


Current Medications





Current Medications


Succinylcholine Chloride (Anectine) 100 mg 1X  ONCE IV ;  Start 1/4/21 at 05:30;

 Stop 1/4/21 at 04:53;  Status DC


Etomidate (Amidate) 20 mg 1X  ONCE IV ;  Start 1/4/21 at 05:30;  Stop 1/4/21 at 

04:53;  Status DC


Fentanyl Citrate (Fentanyl 2ml Vial) 25 mcg PRN Q1HR  PRN IV COMM;  Start 1/4/21

at 05:00;  Stop 1/4/21 at 04:53;  Status DC


Fentanyl Citrate (Fentanyl 2ml Vial) 50 mcg PRN Q1HR  PRN IV SEE COMMENTS;  

Start 1/4/21 at 05:00;  Stop 1/4/21 at 04:53;  Status DC


Chlorhexidine Gluconate (Peridex) 15 ml BID MM ;  Start 1/4/21 at 09:00;  Stop 

1/4/21 at 04:53;  Status DC


Morphine Sulfate (Morphine Sulfate) 2 mg PRN Q1HR  PRN IV SEE COMMENTS.;  Start 

1/4/21 at 05:00;  Stop 1/4/21 at 04:53;  Status DC


Morphine Sulfate (Morphine Sulfate) 4 mg PRN Q1HR  PRN IV SEE COMMENTS.;  Start 

1/4/21 at 05:00;  Stop 1/4/21 at 04:53;  Status DC


Ceftriaxone Sodium (Rocephin) 1 gm 1X  ONCE IVP  Last administered on 1/4/21at 

05:17;  Start 1/4/21 at 05:30;  Stop 1/4/21 at 05:31;  Status DC


Dexamethasone Sodium Phosphate (Decadron) 10 mg 1X  ONCE IVP  Last administered 

on 1/4/21at 05:16;  Start 1/4/21 at 05:30;  Stop 1/4/21 at 05:31;  Status DC


Ondansetron HCl (Zofran) 4 mg PRN Q8HRS  PRN IV NAUSEA/VOMITING 1st choice;  

Start 1/4/21 at 05:30;  Stop 1/5/21 at 05:29





Active Scripts


Active


Combivent Respimat Inhal (Ipratropium/Albuterol Sulfate) 4 Gm Aer.w.adap 2 Inh 

IH QID 30 Days


Prednisone 20 Mg Tablet 1 Tab PO DAILY


Doxycycline Hyclate 100 Mg Tablet 100 Mg PO BID 7 Days


Freedom 5-325 Tablet (Acetaminophen/Hydrocodone Bitart) 1 Each Tablet 1 Tab PO PRN

Q6HRS PRN 5 Days


Orphenadrine Citrate 100 Mg Tablet.er 1 Tab PO BID PRN


Reported


Atorvastatin Calcium 40 Mg Tablet 40 Mg PO QHS


Sertraline Hcl 100 Mg Tablet 100 Mg PO DAILY


Fluticasone Propionate Nasal Spray (Fluticasone Propionate) 16 Gm [place].susp 2 

Spr ASIM DAILY


Famotidine 40 Mg Tablet 40 Mg PO DAILY


Trazodone Hcl 100 Mg Tablet 100 Mg PO PRN QHS PRN


Lantus Solostar (Insulin Glargine,Hum.rec.anlog) 100 Unit/1 Ml Insuln.pen 30 

Unit SQ BID


Amitriptyline Hcl 50 Mg Tablet 2 Tab PO QHS


Novolog (Insulin Aspart) 100 Unit/1 Ml Cartridge 50 Unit SQ TIDAC


Aspirin 81 Mg Tab.chew 1 Tab PO DAILY


Lisinopril 20 Mg Tablet 2 Tab PO DAILY





Allergies


Allergies:  


Coded Allergies:  


     Sulfa (Sulfonamide Antibiotics) (Verified  Allergy, Intermediate, 3/30/16)


     adhesive tape (Verified  Allergy, Intermediate, skin sensitive to tape, 

3/30/16)





ROS


General:  YES: Chills, Fatigue, Malaise; 


   No: Night Sweats, Appetite, Other


PSYCHOLOGICAL ROS:  No: Anxiety, Behavioral Disorder, Concentration difficultie,

Decreased libido, Depression, Disorientation, Hallucinations, Hostility, 

Irritablity, Memory difficulties, Mood Swings, Obsessive thoughts, Physical 

abuse, Sexual abuse, Sleep disturbances, Suicidal ideation, Other


Eyes:  No Blurry vision, No Decreased vision, No Double vision, No Dry eyes, No 

Excessive tearing, No Eye Pain, No Itchy Eyes, No Loss of vision, No 

Photophobia, No Scotomata, No Uses contacts, No Uses glasses, No Other


HEENT:  No: Heacaches, Visual Changes, Hearing change, Nasal congestion, Nasal 

discharge, Oral lesions, Sinus pain, Sore Throat, Epistaxis, Sneezing, Snoring, 

Tinnitus, Vertigo, Vocal changes, Other


ALLERGY AND IMMUNOLOGY:  No: Hives, Insect Bite Sensitivity, Itchy/Watery Eyes, 

Nasal Congestion, Post Nasal Drip, Seasonal Allergies, Other


Hematological and Lymphatic:  No: Bleeding Problems, Blood Clots, Blood 

Transfusions, Brusing, Night Sweats, Pallor, Swollen Lymph Nodes, Other


ENDOCRINE:  No: Breast Changes, Galactorrhea, Hair Pattern Changes, Hot Flashes,

Malaise/lethargy, Mood Swings, Palpitations, Polydipsia/polyuria, Skin Changes, 

Temperature Intolerance, Unexpected Weight Changes, Other


Breast:  No New/Changing Breast Lumps, No Nipple changes, No Nipple discharge, N

o Other


Respiratory:  YES: Cough, Pleuritic Pain, Shortness of breath, SOB with 

excertion, Tachypnea, Wheezing; 


   No: Hemoptysis, Orthopnea, Sputum Changes, Stridor, Other


Cardiovascular:  yes Chest Pain; 


   No Palpitations, No Orthopnea, No Paroxysmal Noc. Dyspnea, No Edema, No Lt 

Headedness, No Other


Gastrointestinal:  Yes Nausea, Yes Vomiting; 


   No Abdominal Pain, No Diarrhea, No Constipation, No Melena, No Hematochezia, 

No Other


Genitourinary:  No Dysuria, No Frequency, No Incontinence, No Hematuria, No 

Retention, No Discharge, No Urgency, No Pain, No Flank Pain, No Other, No , No ,

No , No , No , No , No 


Musculoskeletal:  No Gait Disturbance, No Joint Pain, No Joint Stiffness, No 

Joint Swelling, No Muscle Pain, No Muscular Weakness, No Pain In:, No Swelling 

In:, No Other


Neurological:  No Behavorial Changes, No Bowel/Bladder ControlChng, No 

Confusion, No Dizziness, No Gait Disturbance, No Headaches, No Impaired 

Coord/balance, No Memory Loss, No Numbness/Tingling, No Seizures, No Speech 

Problems, No Tremors, No Visual Changes, No Weakness, No Other


Skin:  No Dry Skin, No Eczema, No Hair Changes, No Lumps, No Mole Changes, No 

Mottling, No Nail Changes, No Pruritus, No Rash, No Skin Lesion Changes, No 

Other, No Acne





Physical Exam


General:  Alert, Oriented X3, Cooperative, moderate distress


HEENT:  Atraumatic, PERRLA, EOMI, Mucous membr. moist/pink


Lungs:  Other (Diffuse scattered wheezing, prolonged expiratory phase)


Heart:  S1S2, RRR, no thrills, no rubs, no gallops, no murmurs


Abdomen:  Normal bowel sounds, Soft, No tenderness, No hepatosplenomegaly, No 

masses


Rectal Exam:  not examined


Extremities:  No clubbing, No cyanosis, No edema, Normal pulses, No 

tenderness/swelling


Skin:  No rashes, No breakdown, No significant lesion


Neuro:  Normal gait, Normal speech, Strength at 5/5 X4 ext, Normal tone, 

Sensation intact, Cranial nerves 3-12 NL, Reflexes 2+


Psych/Mental Status:  Mental status NL, Mood NL





Vitals


Vitals





Vital Signs








  Date Time  Temp Pulse Resp B/P (MAP) Pulse Ox O2 Delivery O2 Flow Rate FiO2


 


1/4/21 07:21 97.8 87 22 194/104 (134) 98 Room Air  





 97.8       











Labs


Labs





Laboratory Tests








Test


 1/4/21


05:00 1/4/21


05:25


 


White Blood Count


 14.8 x10^3/uL


(4.0-11.0) 





 


Red Blood Count


 5.18 x10^6/uL


(4.30-5.70) 





 


Hemoglobin


 12.8 g/dL


(13.0-17.5) 





 


Hematocrit


 39.9 %


(39.0-53.0) 





 


Mean Corpuscular Volume 77 fL ()  


 


Mean Corpuscular Hemoglobin 25 pg (25-35)  


 


Mean Corpuscular Hemoglobin


Concent 32 g/dL


(31-37) 





 


Red Cell Distribution Width


 14.9 %


(11.5-14.5) 





 


Platelet Count


 389 x10^3/uL


(140-400) 





 


Neutrophils (%) (Auto) 77 % (31-73)  


 


Lymphocytes (%) (Auto) 13 % (24-48)  


 


Monocytes (%) (Auto) 8 % (0-9)  


 


Eosinophils (%) (Auto) 2 % (0-3)  


 


Basophils (%) (Auto) 1 % (0-3)  


 


Neutrophils # (Auto)


 11.3 x10^3/uL


(1.8-7.7) 





 


Lymphocytes # (Auto)


 1.9 x10^3/uL


(1.0-4.8) 





 


Monocytes # (Auto)


 1.2 x10^3/uL


(0.0-1.1) 





 


Eosinophils # (Auto)


 0.3 x10^3/uL


(0.0-0.7) 





 


Basophils # (Auto)


 0.1 x10^3/uL


(0.0-0.2) 





 


Sodium Level


 143 mmol/L


(136-145) 





 


Potassium Level


 4.0 mmol/L


(3.5-5.1) 





 


Chloride Level


 103 mmol/L


() 





 


Carbon Dioxide Level


 25 mmol/L


(21-32) 





 


Anion Gap 15 (6-14)  


 


Blood Urea Nitrogen


 19 mg/dL


(8-26) 





 


Creatinine


 1.1 mg/dL


(0.7-1.3) 





 


Estimated GFR


(Cockcroft-Gault) 88.8 


 





 


BUN/Creatinine Ratio 17 (6-20)  


 


Glucose Level


 130 mg/dL


(70-99) 





 


Lactic Acid Level


 1.2 mmol/L


(0.4-2.0) 





 


Calcium Level


 8.8 mg/dL


(8.5-10.1) 





 


Total Bilirubin


 0.3 mg/dL


(0.2-1.0) 





 


Aspartate Amino Transf


(AST/SGOT) 19 U/L (15-37) 


 





 


Alanine Aminotransferase


(ALT/SGPT) 30 U/L (16-63) 


 





 


Alkaline Phosphatase


 99 U/L


() 





 


Troponin I Quantitative


 < 0.017 ng/mL


(0.000-0.055) 





 


NT-Pro-B-Type Natriuretic


Peptide 43 pg/mL


(0-124) 





 


Total Protein


 7.1 g/dL


(6.4-8.2) 





 


Albumin


 3.2 g/dL


(3.4-5.0) 





 


Albumin/Globulin Ratio 0.8 (1.0-1.7)  


 


Influenza Type A Antigen


 


 Negative


(NEGATIVE)


 


Influenza Type B Antigen


 


 Negative


(NEGATIVE)








Laboratory Tests








Test


 1/4/21


05:00 1/4/21


05:25


 


White Blood Count


 14.8 x10^3/uL


(4.0-11.0) 





 


Red Blood Count


 5.18 x10^6/uL


(4.30-5.70) 





 


Hemoglobin


 12.8 g/dL


(13.0-17.5) 





 


Hematocrit


 39.9 %


(39.0-53.0) 





 


Mean Corpuscular Volume 77 fL ()  


 


Mean Corpuscular Hemoglobin 25 pg (25-35)  


 


Mean Corpuscular Hemoglobin


Concent 32 g/dL


(31-37) 





 


Red Cell Distribution Width


 14.9 %


(11.5-14.5) 





 


Platelet Count


 389 x10^3/uL


(140-400) 





 


Neutrophils (%) (Auto) 77 % (31-73)  


 


Lymphocytes (%) (Auto) 13 % (24-48)  


 


Monocytes (%) (Auto) 8 % (0-9)  


 


Eosinophils (%) (Auto) 2 % (0-3)  


 


Basophils (%) (Auto) 1 % (0-3)  


 


Neutrophils # (Auto)


 11.3 x10^3/uL


(1.8-7.7) 





 


Lymphocytes # (Auto)


 1.9 x10^3/uL


(1.0-4.8) 





 


Monocytes # (Auto)


 1.2 x10^3/uL


(0.0-1.1) 





 


Eosinophils # (Auto)


 0.3 x10^3/uL


(0.0-0.7) 





 


Basophils # (Auto)


 0.1 x10^3/uL


(0.0-0.2) 





 


Sodium Level


 143 mmol/L


(136-145) 





 


Potassium Level


 4.0 mmol/L


(3.5-5.1) 





 


Chloride Level


 103 mmol/L


() 





 


Carbon Dioxide Level


 25 mmol/L


(21-32) 





 


Anion Gap 15 (6-14)  


 


Blood Urea Nitrogen


 19 mg/dL


(8-26) 





 


Creatinine


 1.1 mg/dL


(0.7-1.3) 





 


Estimated GFR


(Cockcroft-Gault) 88.8 


 





 


BUN/Creatinine Ratio 17 (6-20)  


 


Glucose Level


 130 mg/dL


(70-99) 





 


Lactic Acid Level


 1.2 mmol/L


(0.4-2.0) 





 


Calcium Level


 8.8 mg/dL


(8.5-10.1) 





 


Total Bilirubin


 0.3 mg/dL


(0.2-1.0) 





 


Aspartate Amino Transf


(AST/SGOT) 19 U/L (15-37) 


 





 


Alanine Aminotransferase


(ALT/SGPT) 30 U/L (16-63) 


 





 


Alkaline Phosphatase


 99 U/L


() 





 


Troponin I Quantitative


 < 0.017 ng/mL


(0.000-0.055) 





 


NT-Pro-B-Type Natriuretic


Peptide 43 pg/mL


(0-124) 





 


Total Protein


 7.1 g/dL


(6.4-8.2) 





 


Albumin


 3.2 g/dL


(3.4-5.0) 





 


Albumin/Globulin Ratio 0.8 (1.0-1.7)  


 


Influenza Type A Antigen


 


 Negative


(NEGATIVE)


 


Influenza Type B Antigen


 


 Negative


(NEGATIVE)











Images


Images


Chest radiograph:


Enlarged cardiomediastinal silhouette. Haziness at the bilateral lower lungs. No

gross osseous destructive lesion.





IMPRESSION:


*  Haziness the bilateral lower lungs. A portion of this is likely secondary to 

overlap of soft tissue structures but a region of atelectasis or infiltrate is 

not excluded given this finding.


*  Enlarged cardiomediastinal silhouette again seen.





VTE Prophylaxis Ordered


VTE Prophylaxis Devices:  Yes


VTE Pharmacological Prophylaxi:  Yes





Assessment/Plan


Assessment/Plan


A/P:


Acute asthma exacerbation - will cont steroids, inhalers


Acute bronchitis with pneumonia - meets sepsis criteria with his RR and 

leukocytosis, covered with rocephin and doxy. Given IVF


Chest pain - EKG and trop negative, seems pleuritic. He states he has been 

gaining weight, Echo shows normal cardiac function previously


HTN - will continue home meds


DM2 - on 30u BID lantus and 35u TID novolog with frequent hypoglycemia, I will 

reduce his novolog dosing and add bolus plus sliding scale to 25U TID and high 

sliding


Morbid obesity - counseled on weight loss


Obstructive sleep apnea - BIPAP 26/12 overnight previously. Unfortunately, 

apparently his insurance has not approved this


Diabetic neuropathy - gabapentin


Nausea and vomiting - likely related to paroxysmal coughing





FEN - ADA diet


PPX - lovenox


FULL CODE


Dispo - inpatient for above





COVID-19 CRITERIA:    The patient was evaluated during the global COVID-19 

pandemic, and that diagnosis was suspected/considered upon their initial 

presentation.  Their evaluation, treatment and testing was consistent with 

current guidelines for patients who present with complaints or symptoms that may

be related to COVID-19.











Justifications for Admission


Other Justification














GUDELIA SCHMID MD         Jan 4, 2021 07:30

## 2021-01-04 NOTE — NUR
Admit order placed for med surg, however, pt has orders for PRN labetolol. This RN called 
Dr. Ramon to clarify if pt needs tele monitoring. Dr. Ramon gave orders to make pt med 
monitored. Will place tele on pt when upon arrival to unit. 

-------------------------------------------------------------------------------

Addendum: 01/04/21 at 1729 by NATTY BREWER RN

-------------------------------------------------------------------------------

Adjustment to previous: Will place tele on pt upon arrival to unit.

## 2021-01-04 NOTE — NUR
Pt arrived on unit by bed at approx 1805 from ER. Orders reviewed/acknowledged. Pt 
transferred to bed with SB assist, call light placed within reach. Tele monitor applied, pt 
SR on the monitor. Fresh water given, tray ordered. Will assume care.

## 2021-01-04 NOTE — RAD
INDICATION: Reason: respiratory distress   ER#21 / Spl. Instructions:  / History: 



COMPARISON: December 5, 2019



FINDINGS:



Single view of chest obtained.

Enlarged cardiomediastinal silhouette. Haziness at the bilateral lower lungs. No gross osseous destru
ctive lesion.





IMPRESSION:



*  Haziness the bilateral lower lungs. A portion of this is likely secondary to overlap of soft tissu
e structures but a region of atelectasis or infiltrate is not excluded given this finding.



*  Enlarged cardiomediastinal silhouette again seen.



Electronically signed by: [place] Berrios MD (1/4/2021 5:46 AM) [id number]

## 2021-01-04 NOTE — PHYS DOC
Past Medical History


Past Medical History:  Asthma, Diabetes-Type II, High Cholesterol, Hypertension,

Other


Additional Past Medical Histor:  Morbid obesity, neuropathy


Past Surgical History:  Other


Additional Past Surgical Histo:  L SHOULDER ORIF


Smoking Status:  Former Smoker


Alcohol Use:  Occasionally


Drug Use:  Marijuana





General Adult


EDM:


Chief Complaint:  SHORTNESS OF BREATH





HPI:


HPI:





Patient is a 40  year old male past medical history hypertension hyperlipidemia 

diabetes asthma presents with a chief complaint of shortness of breath.  Patient

states she has had shortness of breath x3 days progressively coming worse.  

Patient states he has associated cough with some sputum production.  Patient 

states he has had a fever.  Patient also has had some nausea with vomiting.





Review of Systems:


Review of Systems:


Constitutional: positivef fever


Eyes:   Denies change in visual acuity. []


HENT:   Denies nasal congestion or sore throat. [] 


Respiratory:   Positive cough or shortness of breath. [] 


Cardiovascular:   Denies chest pain or edema. [] 


GI:   Denies abdominal pain, , bloody stools or diarrhea. [Positive nausea, 

vomiting] 


:  Denies dysuria. [] 


Musculoskeletal:   Denies back pain or joint pain. [] 


Integument:   Denies rash. [] 


Neurologic:   Denies headache, focal weakness or sensory changes. [] 


Endocrine:   Denies polyuria or polydipsia. [] 


Lymphatic:  Denies swollen glands. [] 


Psychiatric:  Denies depression or anxiety. []





Heart Score:


Risk Factors:


Risk Factors:  DM, Current or recent (<one month) smoker, HTN, HLP, family 

history of CAD, obesity.


Risk Scores:


Score 0 - 3:  2.5% MACE over next 6 weeks - Discharge Home


Score 4 - 6:  20.3% MACE over next 6 weeks - Admit for Clinical Observation


Score 7 - 10:  72.7% MACE over next 6 weeks - Early Invasive Strategies





Current Medications:





Current Medications








 Medications


  (Trade)  Dose


 Ordered  Sig/Pratik  Start Time


 Stop Time Status Last Admin


Dose Admin


 


 Ceftriaxone Sodium


  (Rocephin)  1 gm  1X  ONCE  21 05:30


 21 05:31   





 


 Chlorhexidine


 Gluconate


  (Peridex)  15 ml  BID  21 09:00


 21 04:53 DC  





 


 Dexamethasone


 Sodium Phosphate


  (Decadron)  10 mg  1X  ONCE  21 05:30


 21 05:31   





 


 Etomidate


  (Amidate)  20 mg  1X  ONCE  21 05:30


 21 04:53 DC  





 


 Fentanyl Citrate


  (Fentanyl 2ml


 Vial)  50 mcg  PRN Q1HR  PRN  21 05:00


 21 04:53 DC  





 


 Morphine Sulfate


  (Morphine


 Sulfate)  4 mg  PRN Q1HR  PRN  21 05:00


 21 04:53 DC  





 


 Succinylcholine


 Chloride


  (Anectine)  100 mg  1X  ONCE  21 05:30


 21 04:53 DC  














Allergies:


Allergies:





Allergies








Coded Allergies Type Severity Reaction Last Updated Verified


 


  Sulfa (Sulfonamide Antibiotics) Allergy Intermediate  3/30/16 Yes


 


  adhesive tape Allergy Intermediate skin sensitive to tape 3/30/16 Yes











Physical Exam:


PE:





Constitutional: Well developed, well nourished, no acute distress, non-toxic 

appearance. []


HENT: Normocephalic, atraumatic, bilateral external ears normal, oropharynx 

moist, no oral exudates, nose normal. []


Eyes: PERRLA, EOMI, conjunctiva normal, no discharge. [] 


Neck: Normal range of motion, no tenderness, supple, no stridor. [] 


Cardiovascular:Heart rate regular rhythm, no murmur []


Lungs & Thorax:  Bilateral breath sounds clear to auscultation []


Abdomen: Bowel sounds normal, soft, no tenderness, no masses, no pulsatile 

masses. [] 


Skin: Warm, dry, no erythema, no rash. [] 


Back: No tenderness, no CVA tenderness. [] 


Extremities: No tenderness, no cyanosis, no clubbing, ROM intact, no edema. [] 


Neurologic: Alert and oriented X 3, normal motor function, normal sensory 

function, no focal deficits noted. []


Psychologic: Affect normal, judgement normal, mood normal. []





EKG:


EKhrs


Sinus rhythm no ST elevation no ST depression no acute MI []





Radiology/Procedures:


Radiology/Procedures:


[]


Impression:


Single view of chest obtained.


Enlarged cardiomediastinal silhouette. Haziness at the bilateral lower lungs. No

 gross osseous destructive lesion.








IMPRESSION:





*  Haziness the bilateral lower lungs. A portion of this is likely secondary to 

overlap of soft tissue structures but a region of atelectasis or infiltrate is 

not excluded given this finding.





*  Enlarged cardiomediastinal silhouette again seen.





Electronically signed by: [place] Berrios MD (2021 5:46 AM) [id number]











Course & Med Decision Making:


Course & Med Decision Making


Pertinent Labs and Imaging studies reviewed. (See chart for details)





[] Patient was evaluated for chief complaint.  Work-up consisted of laboratory 

analysis and radiologic imaging and EKG.  Results reviewed and discussed with 

patient.





Patient suspected to be PUI.





Treatment included Decadron 10 mg IV push, Rocephin and Zithromax.





Patient admitted to the hospitalist for further evaluation and treatment.





Dragon Disclaimer:


Dragon Disclaimer:


This electronic medical record was generated, in whole or in part, using a voice

 recognition dictation system.





Departure


Departure


Impression:  


   Primary Impression:  


   Person under investigation for COVID-19


   Additional Impression:  


   Pneumonia


Disposition:  09 ADMITTED AS INPT THIS HOSP


Admitting Physician:  HIMS


Condition:  STABLE


Referrals:  


NO PCP (PCP)











KAMRAN AMARO DO             2021 05:20

## 2021-01-04 NOTE — EKG
[facility]

              [address] [place] [address]

Test Date:    2021               Test Time:    04:44:01

Pat Name:     TERESA SAMSON         Department:   

Patient ID:   [id number]           Room:         ED HOLD 1

Gender:       M                        Technician:   

:          [id number]               Requested By: KAMRAN AMARO

Order Number: [id number]           Reading MD:   Lokesh Rob

                                 Measurements

Intervals                              Axis          

Rate:         93                       P:            37

SC:           130                      QRS:          29

QRSD:         88                       T:            13

QT:           358                                    

QTc:          448                                    

                           Interpretive Statements

SINUS RHYTHM

NORMAL ECG

Electronically Signed On 2021 13:33:56 CST by Lokesh Rob

## 2021-01-05 VITALS — DIASTOLIC BLOOD PRESSURE: 81 MMHG | SYSTOLIC BLOOD PRESSURE: 155 MMHG

## 2021-01-05 VITALS — DIASTOLIC BLOOD PRESSURE: 69 MMHG | SYSTOLIC BLOOD PRESSURE: 128 MMHG

## 2021-01-05 VITALS — DIASTOLIC BLOOD PRESSURE: 80 MMHG | SYSTOLIC BLOOD PRESSURE: 150 MMHG

## 2021-01-05 VITALS — SYSTOLIC BLOOD PRESSURE: 179 MMHG | DIASTOLIC BLOOD PRESSURE: 93 MMHG

## 2021-01-05 VITALS — SYSTOLIC BLOOD PRESSURE: 161 MMHG | DIASTOLIC BLOOD PRESSURE: 80 MMHG

## 2021-01-05 LAB
ANION GAP SERPL CALC-SCNC: 8 MMOL/L (ref 6–14)
BASOPHILS # BLD AUTO: 0.1 X10^3/UL (ref 0–0.2)
BASOPHILS NFR BLD: 0 % (ref 0–3)
BUN SERPL-MCNC: 15 MG/DL (ref 8–26)
CALCIUM SERPL-MCNC: 8.6 MG/DL (ref 8.5–10.1)
CHLORIDE SERPL-SCNC: 100 MMOL/L (ref 98–107)
CO2 SERPL-SCNC: 27 MMOL/L (ref 21–32)
CREAT SERPL-MCNC: 1 MG/DL (ref 0.7–1.3)
EOSINOPHIL NFR BLD: 0.1 X10^3/UL (ref 0–0.7)
EOSINOPHIL NFR BLD: 1 % (ref 0–3)
ERYTHROCYTE [DISTWIDTH] IN BLOOD BY AUTOMATED COUNT: 14.9 % (ref 11.5–14.5)
GFR SERPLBLD BASED ON 1.73 SQ M-ARVRAT: 99.2 ML/MIN
GLUCOSE SERPL-MCNC: 246 MG/DL (ref 70–99)
HCT VFR BLD CALC: 38.2 % (ref 39–53)
HGB BLD-MCNC: 12.1 G/DL (ref 13–17.5)
LYMPHOCYTES # BLD: 1.1 X10^3/UL (ref 1–4.8)
LYMPHOCYTES NFR BLD AUTO: 9 % (ref 24–48)
MCH RBC QN AUTO: 25 PG (ref 25–35)
MCHC RBC AUTO-ENTMCNC: 32 G/DL (ref 31–37)
MCV RBC AUTO: 78 FL (ref 79–100)
MONO #: 1.1 X10^3/UL (ref 0–1.1)
MONOCYTES NFR BLD: 8 % (ref 0–9)
NEUT #: 10.7 X10^3/UL (ref 1.8–7.7)
NEUTROPHILS NFR BLD AUTO: 82 % (ref 31–73)
PLATELET # BLD AUTO: 355 X10^3/UL (ref 140–400)
POTASSIUM SERPL-SCNC: 4.1 MMOL/L (ref 3.5–5.1)
RBC # BLD AUTO: 4.89 X10^6/UL (ref 4.3–5.7)
SODIUM SERPL-SCNC: 135 MMOL/L (ref 136–145)
WBC # BLD AUTO: 13.1 X10^3/UL (ref 4–11)

## 2021-01-05 RX ADMIN — HYDROCODONE BITARTRATE AND ACETAMINOPHEN PRN TAB: 5; 325 TABLET ORAL at 11:34

## 2021-01-05 RX ADMIN — HYDROCODONE BITARTRATE AND ACETAMINOPHEN PRN TAB: 5; 325 TABLET ORAL at 04:57

## 2021-01-05 RX ADMIN — INSULIN LISPRO SCH UNITS: 100 INJECTION, SOLUTION INTRAVENOUS; SUBCUTANEOUS at 12:29

## 2021-01-05 RX ADMIN — INSULIN LISPRO SCH UNITS: 100 INJECTION, SOLUTION INTRAVENOUS; SUBCUTANEOUS at 17:53

## 2021-01-05 RX ADMIN — DOXYCYCLINE HYCLATE SCH MG: 100 TABLET, COATED ORAL at 08:49

## 2021-01-05 RX ADMIN — INSULIN LISPRO SCH UNITS: 100 INJECTION, SOLUTION INTRAVENOUS; SUBCUTANEOUS at 08:15

## 2021-01-05 RX ADMIN — FLUTICASONE FUROATE AND VILANTEROL TRIFENATATE SCH PUFF: 200; 25 POWDER RESPIRATORY (INHALATION) at 08:51

## 2021-01-05 RX ADMIN — INSULIN LISPRO SCH UNITS: 100 INJECTION, SOLUTION INTRAVENOUS; SUBCUTANEOUS at 08:14

## 2021-01-05 RX ADMIN — LABETALOL HYDROCHLORIDE PRN MG: 5 INJECTION, SOLUTION INTRAVENOUS at 03:30

## 2021-01-05 RX ADMIN — INSULIN LISPRO SCH UNITS: 100 INJECTION, SOLUTION INTRAVENOUS; SUBCUTANEOUS at 17:54

## 2021-01-05 RX ADMIN — ENOXAPARIN SODIUM SCH MG: 100 INJECTION SUBCUTANEOUS at 08:50

## 2021-01-05 RX ADMIN — INSULIN LISPRO SCH UNITS: 100 INJECTION, SOLUTION INTRAVENOUS; SUBCUTANEOUS at 12:28

## 2021-01-05 NOTE — NUR
Pt left unit at approx 1802 by wheelchair via private vehicle. Pt's IV removed without 
complication, VSS. Discharge paperwork and follow-up discussed with pt, additional questions 
addressed. COVID information provided to pt.

## 2021-01-05 NOTE — NUR
SW following for discharge planning. Spoke with RN and reviewed chart. Pt to discharge home 
today, 1/5 self-care. Pt on room air and oral medications. No further SW needs.

## 2021-01-05 NOTE — PDOC
[employer] PROGRESS NOTE


Date of Service


DOS:


DATE: 1/5/21 


TIME: 12:00





Chief Complaint


Chief Complaint


A/P:


Acute asthma exacerbation - will cont steroids, inhalers


Acute bronchitis with pneumonia - meets sepsis criteria with his RR and 

leukocytosis, covered with rocephin and doxy. Given IVF


Chest pain - EKG and trop negative, seems pleuritic. He states he has been 

gaining weight, Echo shows normal cardiac function previously


HTN - will continue home meds


DM2 - on 30u BID lantus and 35u TID novolog with frequent hypoglycemia, I will 

reduce his novolog dosing and add bolus plus sliding scale to 25U TID and high 

sliding


Morbid obesity - counseled on weight loss


Obstructive sleep apnea - BIPAP 26/12 overnight previously. Unfortunately, 

apparently his insurance has not approved this


Diabetic neuropathy - gabapentin


Nausea and vomiting - likely related to paroxysmal coughing





FEN - ADA diet


PPX - lovenox


FULL CODE


Dispo - inpatient for above





History of Present Illness


History of Present Illness


Mr Haq is a 38 year old Male patient w/ PMHx asthma, HTN, DM2, morbid 

obesity, and newly diagnosed sleep apnea recently who presented to ED coughing 

up yellow sputum having fevers with wheezing and quite short of breath which has

been progressive for the past 3 days prior to presentation.


He has associated chest pain in center of the chest feeling like pressure but 

also worse with coughing and with palpation.


Also c/o hot and cold flashes, and N/V x1 day.


Pt unaware of any recent covid exposure.


He denies any  symptoms


EKG Sinus rhythm no ST elevation no ST depression no acute MI.


Chest radiograph with bilateral lower lobe haziness.


Labs with WBC 14.8, Hb 12.8, platelets 389, Na 143, K 4, BUN 19, Cr 1.1, glucose

130. Rapid influenza negative. Troponin negative


Admitted for further care.





Feeling less short of breath. Afebrile. Still c/o pain on deep inspiration and 

coughing.





Plan:


F/u trop, BMP, CBC


GI cocktail, can likely d/c if repeat labs WNL.





Vitals/I&O


Vitals/I&O:





                                   Vital Signs








  Date Time  Temp Pulse Resp B/P (MAP) Pulse Ox O2 Delivery O2 Flow Rate FiO2


 


1/5/21 08:51  83  150/80    


 


1/5/21 08:08      Room Air  


 


1/5/21 07:00 97.6  39  97   





 97.6       














                                    I & O   


 


 1/4/21 1/4/21 1/5/21





 15:00 23:00 07:00


 


Intake Total  120 ml 850 ml


 


Output Total 400 ml  


 


Balance -400 ml 120 ml 850 ml











Physical Exam


General:  Alert, Oriented X3, Cooperative, moderate distress


Lungs:  Other


Abdomen:  Normal bowel sounds, Soft, No tenderness, No hepatosplenomegaly, No 

masses


Extremities:  No clubbing, No cyanosis, No edema, Normal pulses, No 

tenderness/swelling


Skin:  No rashes, No breakdown, No significant lesion





Labs


Labs:





Laboratory Tests








Test


 1/4/21


12:36 1/4/21


17:15 1/4/21


21:24 1/5/21


08:13


 


Glucose (Fingerstick)


 228 mg/dL


(70-99) 253 mg/dL


(70-99) 286 mg/dL


(70-99) 246 mg/dL


(70-99)











Assessment and Plan


Assessmemt and Plan


Problems


Medical Problems:


(1) Person under investigation for COVID-19


Status: Acute  





(2) Pneumonia


Status: Acute  











Comment


Review of Relevant


I have reviewed the following items martha (where applicable) has been applied.


Medications:





Current Medications








 Medications


  (Trade)  Dose


 Ordered  Sig/Pratik


 Route


 PRN Reason  Start Time


 Stop Time Status Last Admin


Dose Admin


 


 Montelukast Sodium


  (Singulair)  10 mg  QHS


 PO


   1/4/21 21:00


    1/4/21 22:08





 


 Insulin Human


 Lispro


  (HumaLOG)  0-9 UNITS  TIDWMEALS


 SQ


   1/4/21 17:00


    1/5/21 08:15





 


 Aspirin


  (Aspirin


 Chewable)  81 mg  DAILY


 PO


   1/5/21 09:00


    1/5/21 08:49





 


 Atorvastatin


 Calcium


  (Lipitor)  40 mg  QHS


 PO


   1/4/21 21:00


    1/4/21 22:08





 


 Doxycycline


 Hyclate


  (Vibra-Tab)  100 mg  BID


 PO


   1/4/21 21:00


    1/5/21 08:49





 


 Fluticasone


 Propionate


  (Flonase)  2 spray  DAILY


 NS


   1/5/21 09:00


    1/5/21 08:52





 


 Acetaminophen/


 Hydrocodone Bitart


  (Lortab 5/325)  1 tab  PRN Q6HRS  PRN


 PO


 MODERATE PAIN  1/4/21 13:15


    1/5/21 11:34





 


 Trazodone HCl


  (Desyrel)  100 mg  PRN QHS  PRN


 PO


 INSOMNIA  1/4/21 13:15


    1/4/21 22:18





 


 Insulin Human


 Lispro


  (HumaLOG)  8 units  TIDWMEALS


 SQ


   1/4/21 17:00


    1/5/21 08:14





 


 Insulin Glargine


  (Lantus Syringe)  30 unit  QHS


 SQ


   1/4/21 21:00


    1/4/21 22:10





 


 Sertraline HCl


  (Zoloft)  100 mg  DAILY


 PO


   1/5/21 09:00


    1/5/21 08:49





 


 Morphine Sulfate


  (Morphine


 Sulfate)  2 mg  PRN Q4HRS  PRN


 IVP


 UNRESOLVED PAIN, 1st CHOICE  1/4/21 13:15


    1/4/21 14:07





 


 Insulin Human


 Lispro


  (HumaLOG)  5 units  1X  ONCE


 SQ


   1/4/21 14:00


 1/4/21 14:01 DC 1/4/21 15:10





 


 Labetalol HCl


  (Normodyne Iv


 Push)  10 mg  PRN Q2HR  PRN


 IVP


 HYPERTENSION  1/4/21 16:30


    1/5/21 03:30





 


 Losartan Potassium


  (Cozaar)  50 mg  DAILY


 PO


   1/5/21 09:00


    1/5/21 08:51





 


 Pantoprazole


 Sodium


  (Protonix)  40 mg  DAILYAC


 PO


   1/5/21 07:30


    1/5/21 07:55





 


 Prednisone


  (Prednisone)  20 mg  DAILY


 PO


   1/5/21 09:00


    1/5/21 08:49





 


 Enoxaparin Sodium


  (Lovenox 60mg


 Syringe)  60 mg  Q12HR


 SQ


   1/4/21 22:00


    1/5/21 08:50














Justifications for Admission


Other Justification














GUDELIA SCHMID MD         Jan 5, 2021 12:00

## 2021-01-05 NOTE — PDOC3
Discharge Summary


Visit Information


Date of Admission:  Jan 4, 2021


Date of Discharge:  Jan 5, 2021


Admitting Diagnosis:  Chest pain


Final Diagnosis


Problems


Medical Problems:


(1) Person under investigation for COVID-19


Status: Acute  





(2) Pneumonia


Status: Acute  











Brief Hospital Course


Allergies





                                    Allergies








Coded Allergies Type Severity Reaction Last Updated Verified


 


  Sulfa (Sulfonamide Antibiotics) Allergy Intermediate  3/30/16 Yes


 


  adhesive tape Allergy Intermediate skin sensitive to tape 3/30/16 Yes








Vital Signs





Vital Signs








  Date Time  Temp Pulse Resp B/P (MAP) Pulse Ox O2 Delivery O2 Flow Rate FiO2


 


1/5/21 11:00 95.8 75 13 128/69 (88) 99 Room Air  





 95.8       








Lab Results





Laboratory Tests








Test


 1/4/21


05:00 1/4/21


05:25 1/4/21


07:21 1/4/21


12:36


 


White Blood Count


 14.8 x10^3/uL


(4.0-11.0) 


 


 





 


Red Blood Count


 5.18 x10^6/uL


(4.30-5.70) 


 


 





 


Hemoglobin


 12.8 g/dL


(13.0-17.5) 


 


 





 


Hematocrit


 39.9 %


(39.0-53.0) 


 


 





 


Mean Corpuscular Volume 77 fL ()    


 


Mean Corpuscular Hemoglobin 25 pg (25-35)    


 


Mean Corpuscular Hemoglobin


Concent 32 g/dL


(31-37) 


 


 





 


Red Cell Distribution Width


 14.9 %


(11.5-14.5) 


 


 





 


Platelet Count


 389 x10^3/uL


(140-400) 


 


 





 


Neutrophils (%) (Auto) 77 % (31-73)    


 


Lymphocytes (%) (Auto) 13 % (24-48)    


 


Monocytes (%) (Auto) 8 % (0-9)    


 


Eosinophils (%) (Auto) 2 % (0-3)    


 


Basophils (%) (Auto) 1 % (0-3)    


 


Neutrophils # (Auto)


 11.3 x10^3/uL


(1.8-7.7) 


 


 





 


Lymphocytes # (Auto)


 1.9 x10^3/uL


(1.0-4.8) 


 


 





 


Monocytes # (Auto)


 1.2 x10^3/uL


(0.0-1.1) 


 


 





 


Eosinophils # (Auto)


 0.3 x10^3/uL


(0.0-0.7) 


 


 





 


Basophils # (Auto)


 0.1 x10^3/uL


(0.0-0.2) 


 


 





 


Sodium Level


 143 mmol/L


(136-145) 


 


 





 


Potassium Level


 4.0 mmol/L


(3.5-5.1) 


 


 





 


Chloride Level


 103 mmol/L


() 


 


 





 


Carbon Dioxide Level


 25 mmol/L


(21-32) 


 


 





 


Anion Gap 15 (6-14)    


 


Blood Urea Nitrogen


 19 mg/dL


(8-26) 


 


 





 


Creatinine


 1.1 mg/dL


(0.7-1.3) 


 


 





 


Estimated GFR


(Cockcroft-Gault) 88.8 


 


 


 





 


BUN/Creatinine Ratio 17 (6-20)    


 


Glucose Level


 130 mg/dL


(70-99) 


 


 





 


Lactic Acid Level


 1.2 mmol/L


(0.4-2.0) 


 


 





 


Calcium Level


 8.8 mg/dL


(8.5-10.1) 


 


 





 


Total Bilirubin


 0.3 mg/dL


(0.2-1.0) 


 


 





 


Aspartate Amino Transf


(AST/SGOT) 19 U/L (15-37) 


 


 


 





 


Alanine Aminotransferase


(ALT/SGPT) 30 U/L (16-63) 


 


 


 





 


Alkaline Phosphatase


 99 U/L


() 


 


 





 


Troponin I Quantitative


 < 0.017 ng/mL


(0.000-0.055) 


 


 





 


NT-Pro-B-Type Natriuretic


Peptide 43 pg/mL


(0-124) 


 


 





 


Total Protein


 7.1 g/dL


(6.4-8.2) 


 


 





 


Albumin


 3.2 g/dL


(3.4-5.0) 


 


 





 


Albumin/Globulin Ratio 0.8 (1.0-1.7)    


 


Influenza Type A Antigen


 


 Negative


(NEGATIVE) 


 





 


Influenza Type B Antigen


 


 Negative


(NEGATIVE) 


 





 


Glucose (Fingerstick)


 


 


 143 mg/dL


(70-99) 228 mg/dL


(70-99)


 


Test


 1/4/21


17:15 1/4/21


21:24 1/5/21


08:13 1/5/21


12:10


 


Glucose (Fingerstick)


 253 mg/dL


(70-99) 286 mg/dL


(70-99) 246 mg/dL


(70-99) 215 mg/dL


(70-99)


 


Test


 1/5/21


13:44 


 


 





 


White Blood Count


 13.1 x10^3/uL


(4.0-11.0) 


 


 





 


Red Blood Count


 4.89 x10^6/uL


(4.30-5.70) 


 


 





 


Hemoglobin


 12.1 g/dL


(13.0-17.5) 


 


 





 


Hematocrit


 38.2 %


(39.0-53.0) 


 


 





 


Mean Corpuscular Volume 78 fL ()    


 


Mean Corpuscular Hemoglobin 25 pg (25-35)    


 


Mean Corpuscular Hemoglobin


Concent 32 g/dL


(31-37) 


 


 





 


Red Cell Distribution Width


 14.9 %


(11.5-14.5) 


 


 





 


Platelet Count


 355 x10^3/uL


(140-400) 


 


 





 


Neutrophils (%) (Auto) 82 % (31-73)    


 


Lymphocytes (%) (Auto) 9 % (24-48)    


 


Monocytes (%) (Auto) 8 % (0-9)    


 


Eosinophils (%) (Auto) 1 % (0-3)    


 


Basophils (%) (Auto) 0 % (0-3)    


 


Neutrophils # (Auto)


 10.7 x10^3/uL


(1.8-7.7) 


 


 





 


Lymphocytes # (Auto)


 1.1 x10^3/uL


(1.0-4.8) 


 


 





 


Monocytes # (Auto)


 1.1 x10^3/uL


(0.0-1.1) 


 


 





 


Eosinophils # (Auto)


 0.1 x10^3/uL


(0.0-0.7) 


 


 





 


Basophils # (Auto)


 0.1 x10^3/uL


(0.0-0.2) 


 


 





 


Sodium Level


 135 mmol/L


(136-145) 


 


 





 


Potassium Level


 4.1 mmol/L


(3.5-5.1) 


 


 





 


Chloride Level


 100 mmol/L


() 


 


 





 


Carbon Dioxide Level


 27 mmol/L


(21-32) 


 


 





 


Anion Gap 8 (6-14)    


 


Blood Urea Nitrogen


 15 mg/dL


(8-26) 


 


 





 


Creatinine


 1.0 mg/dL


(0.7-1.3) 


 


 





 


Estimated GFR


(Cockcroft-Gault) 99.2 


 


 


 





 


Glucose Level


 246 mg/dL


(70-99) 


 


 





 


Calcium Level


 8.6 mg/dL


(8.5-10.1) 


 


 





 


Troponin I Quantitative


 < 0.017 ng/mL


(0.000-0.055) 


 


 











Laboratory Tests








Test


 1/4/21


17:15 1/4/21


21:24 1/5/21


08:13 1/5/21


12:10


 


Glucose (Fingerstick)


 253 mg/dL


(70-99) 286 mg/dL


(70-99) 246 mg/dL


(70-99) 215 mg/dL


(70-99)


 


Test


 1/5/21


13:44 


 


 





 


White Blood Count


 13.1 x10^3/uL


(4.0-11.0) 


 


 





 


Red Blood Count


 4.89 x10^6/uL


(4.30-5.70) 


 


 





 


Hemoglobin


 12.1 g/dL


(13.0-17.5) 


 


 





 


Hematocrit


 38.2 %


(39.0-53.0) 


 


 





 


Mean Corpuscular Volume 78 fL ()    


 


Mean Corpuscular Hemoglobin 25 pg (25-35)    


 


Mean Corpuscular Hemoglobin


Concent 32 g/dL


(31-37) 


 


 





 


Red Cell Distribution Width


 14.9 %


(11.5-14.5) 


 


 





 


Platelet Count


 355 x10^3/uL


(140-400) 


 


 





 


Neutrophils (%) (Auto) 82 % (31-73)    


 


Lymphocytes (%) (Auto) 9 % (24-48)    


 


Monocytes (%) (Auto) 8 % (0-9)    


 


Eosinophils (%) (Auto) 1 % (0-3)    


 


Basophils (%) (Auto) 0 % (0-3)    


 


Neutrophils # (Auto)


 10.7 x10^3/uL


(1.8-7.7) 


 


 





 


Lymphocytes # (Auto)


 1.1 x10^3/uL


(1.0-4.8) 


 


 





 


Monocytes # (Auto)


 1.1 x10^3/uL


(0.0-1.1) 


 


 





 


Eosinophils # (Auto)


 0.1 x10^3/uL


(0.0-0.7) 


 


 





 


Basophils # (Auto)


 0.1 x10^3/uL


(0.0-0.2) 


 


 





 


Sodium Level


 135 mmol/L


(136-145) 


 


 





 


Potassium Level


 4.1 mmol/L


(3.5-5.1) 


 


 





 


Chloride Level


 100 mmol/L


() 


 


 





 


Carbon Dioxide Level


 27 mmol/L


(21-32) 


 


 





 


Anion Gap 8 (6-14)    


 


Blood Urea Nitrogen


 15 mg/dL


(8-26) 


 


 





 


Creatinine


 1.0 mg/dL


(0.7-1.3) 


 


 





 


Estimated GFR


(Cockcroft-Gault) 99.2 


 


 


 





 


Glucose Level


 246 mg/dL


(70-99) 


 


 





 


Calcium Level


 8.6 mg/dL


(8.5-10.1) 


 


 





 


Troponin I Quantitative


 < 0.017 ng/mL


(0.000-0.055) 


 


 











Brief Hospital Course


Mr Haq is a 38 year old Male patient w/ PMHx asthma, HTN, DM2, morbid 

obesity, and newly diagnosed sleep apnea recently who presented to ED coughing 

up yellow sputum having fevers with wheezing and quite short of breath which has

been progressive for the past 3 days prior to presentation.


He has associated chest pain in center of the chest feeling like pressure but 

also worse with coughing and with palpation.


Also c/o hot and cold flashes, and N/V x1 day.


Pt unaware of any recent covid exposure.


He denies any  symptoms


EKG Sinus rhythm no ST elevation no ST depression no acute MI.


Chest radiograph with bilateral lower lobe haziness.


Labs with WBC 14.8, Hb 12.8, platelets 389, Na 143, K 4, BUN 19, Cr 1.1, glucose

130. Rapid influenza negative. Troponin negative


Admitted for further care.





Feeling less short of breath. Afebrile. Still c/o pain on deep inspiration and 

coughing. GI cocktail and nebs, prednisone helping. He discloses that he has not

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 Needs refills for 

everything.





Problem list:


Acute asthma exacerbation - will cont steroids, inhalers, doxycycline at home


Acute bronchitis with pneumonia - meets sepsis criteria with his RR and 

leukocytosis, covered with rocephin and doxy. Given IVF, d/c on doxy, prednisone


Chest pain - EKG and trop negative, seems pleuritic. He states he has been 

gaining weight, Echo shows normal cardiac function previously


HTN - will continue home meds


DM2 - on 30u BID lantus and 35u TID novolog with frequent hypoglycemia, I will 

reduce his novolog dosing and add bolus plus sliding scale to 25U TID and high 

sliding


Morbid obesity - counseled on weight loss


Obstructive sleep apnea - BIPAP 26/12 overnight previously. Unfortunately, 

apparently his insurance has not approved this


Diabetic neuropathy - gabapentin


Nausea and vomiting - likely related to paroxysmal coughing





Plan:


Home meds restarted


GI cocktail, can d/c repeat labs WNL.





Greater than 30 minutes spent on d/c





Discharge Information


Condition at Discharge:  Improved


Follow Up:  Weeks (1)


Disposition/Orders:  D/C to Home


Scheduled


Aspirin (Aspirin) 81 Mg Tab.chew, 1 TAB PO DAILY, #30 Ref 3 (Reported)


   Entered as Reported by: ELGIN VIZCARRA on 1/25/16 1611


   Last Action: Continued on 1/4/21 1308 by GUDELIA SCHMID MD


Atorvastatin Calcium (Atorvastatin Calcium) 40 Mg Tablet, 40 MG PO QHS for 

cholesterol, (Reported)


   Entered as Reported by: ANYI CASTILLO on 3/20/19 2125


   Last Action: Continued on 1/4/21 1308 by GUDELIA SCHMID MD


Doxycycline Hyclate (Doxycycline Hyclate) 100 Mg Tablet, 100 MG PO BID for 

Pneumonia for 7 Days, #14


   Prescribed by: GUDELIA SCHMID MD on 1/5/21 1632


Fluticasone Propionate (Fluticasone Propionate Nasal Spray) 16 Gm Flom.susp, 2 

SPR ASIM DAILY for sinuses, (Reported)


   Entered as Reported by: ANYI CASTILLO on 3/20/19 2125


   Last Action: Continued on 1/4/21 1308 by GUDELIA SCHMID MD


Insulin Glargine,Hum.rec.anlog (Lantus Solostar) 100 Unit/1 Ml Insuln.pen, 30 

UNIT SQ BID for DM2 for 30 Days, #15 Ref 3


   Prescribed by: GUDELIA SCHMID MD on 1/5/21 1632


Ipratropium/Albuterol Sulfate (Combivent Respimat Inhal) 4 Gm Aer.w.adap, 2 INH 

IH QID for Bronchitis for 30 Days, #1 Ref 2


   Prescribed by: GUDELIA SCHMID MD on 1/5/21 1632


Losartan Potassium (Cozaar **) 50 Mg Tablet, 50 MG PO DAILY for HTN for 30 Days,

#30 Ref 2


   Prescribed by: GUDELIA SCHMID MD on 1/5/21 1632


Montelukast Sodium (Montelukast Sodium Tablet  **) 10 Mg Tablet, 10 MG PO QHS 

for COPD for 30 Days, #30 Ref 2


   Prescribed by: GUDELIA SCHMID MD on 1/5/21 1632


Pantoprazole Sodium (Pantoprazole Sodium  **) 40 Mg Tablet.dr, 40 MG PO DAILYAC 

for GERD for 30 Days, #30 Ref 2


   Prescribed by: GUDELIA SCHMID MD on 1/5/21 1632


Prednisone (Prednisone) 20 Mg Tablet, 1 TAB PO DAILY for COPD for 5 Days, #5


   Prescribed by: GUDELIA SCHMID MD on 1/5/21 1632


Sertraline Hcl (Sertraline Hcl) 100 Mg Tablet, 100 MG PO DAILY for bipolar for 

30 Days, #30 Ref 2


   Prescribed by: GUDELIA SCHMID MD on 1/5/21 1632





Scheduled PRN


Hydrocodone/Apap 5-325 (Norco 5-325 Tablet) 1 Each Tablet, 1 TAB PO PRN Q6HRS 

PRN for PAIN for 5 Days, #10 Ref 0


   Prescribed by: GUDELIA SCHMID MD on 1/5/21 1637


Trazodone Hcl (Trazodone Hcl) 100 Mg Tablet, 100 MG PO PRN QHS PRN for INSOMNIA 

for 30 Days, #30 Ref 2


   Prescribed by: GUDELIA SCHMID MD on 1/5/21 1632





Discontinued Medications


Amitriptyline Hcl (Amitriptyline Hcl) 50 Mg Tablet, 2 TAB PO QHS for sleep, #30 

Ref 1 (Reported)


   Entered as Reported by: ELGIN VIZCARRA on 1/25/16 1612


Famotidine (Famotidine) 40 Mg Tablet, 40 MG PO DAILY for indigestion, (Reported)


   Entered as Reported by: ANYI CASTILLO on 3/20/19 2125


Insulin Aspart (Novolog) 100 Unit/1 Ml Cartridge, 50 UNIT SQ TIDAC for blood 

sugar, (Reported)


   Entered as Reported by: ELGIN VIZCARRA on 1/25/16 1612


   Last Action: Converted on 1/4/21 1308 by GUDELIA SCHMID MD


Lisinopril (Lisinopril) 20 Mg Tablet, 2 TAB PO DAILY for blood pressure, #30 Ref

5 (Reported)


   Entered as Reported by: ELGIN VIZCARRA on 1/25/16 1610


   Last Action: Continued on 1/4/21 1308 by GUDELIA SCHMID MD


Orphenadrine Citrate (Orphenadrine Citrate) 100 Mg Tablet.er, 1 TAB PO BID PRN 

for MUSCLE PAIN, #14 Ref 0


   Prescribed by: MEGHA WADDELL D.O. on 8/1/18 0354





Justicifation of Admission Dx:


Justifications for Admission:


Justification of Admission Dx:  Yes











GUDELIA SCHMID MD         Jan 5, 2021 16:41

## 2021-04-12 ENCOUNTER — HOSPITAL ENCOUNTER (INPATIENT)
Dept: HOSPITAL 61 - ER | Age: 41
LOS: 8 days | Discharge: HOME | DRG: 871 | End: 2021-04-20
Attending: STUDENT IN AN ORGANIZED HEALTH CARE EDUCATION/TRAINING PROGRAM | Admitting: STUDENT IN AN ORGANIZED HEALTH CARE EDUCATION/TRAINING PROGRAM
Payer: MEDICAID

## 2021-04-12 VITALS — BODY MASS INDEX: 40.43 KG/M2 | WEIGHT: 315 LBS | HEIGHT: 74 IN

## 2021-04-12 DIAGNOSIS — I10: ICD-10-CM

## 2021-04-12 DIAGNOSIS — A41.59: Primary | ICD-10-CM

## 2021-04-12 DIAGNOSIS — E11.65: ICD-10-CM

## 2021-04-12 DIAGNOSIS — Z80.0: ICD-10-CM

## 2021-04-12 DIAGNOSIS — R65.21: ICD-10-CM

## 2021-04-12 DIAGNOSIS — Z82.49: ICD-10-CM

## 2021-04-12 DIAGNOSIS — E11.42: ICD-10-CM

## 2021-04-12 DIAGNOSIS — J45.901: ICD-10-CM

## 2021-04-12 DIAGNOSIS — G47.33: ICD-10-CM

## 2021-04-12 DIAGNOSIS — Z87.891: ICD-10-CM

## 2021-04-12 DIAGNOSIS — K80.20: ICD-10-CM

## 2021-04-12 DIAGNOSIS — R16.0: ICD-10-CM

## 2021-04-12 DIAGNOSIS — E78.00: ICD-10-CM

## 2021-04-12 DIAGNOSIS — E66.01: ICD-10-CM

## 2021-04-12 DIAGNOSIS — R19.7: ICD-10-CM

## 2021-04-12 DIAGNOSIS — K21.9: ICD-10-CM

## 2021-04-12 DIAGNOSIS — D50.9: ICD-10-CM

## 2021-04-12 DIAGNOSIS — Z83.3: ICD-10-CM

## 2021-04-12 DIAGNOSIS — Z16.29: ICD-10-CM

## 2021-04-12 DIAGNOSIS — E83.42: ICD-10-CM

## 2021-04-12 DIAGNOSIS — Z83.438: ICD-10-CM

## 2021-04-12 DIAGNOSIS — E44.0: ICD-10-CM

## 2021-04-12 DIAGNOSIS — Z20.822: ICD-10-CM

## 2021-04-12 DIAGNOSIS — N12: ICD-10-CM

## 2021-04-12 DIAGNOSIS — J96.01: ICD-10-CM

## 2021-04-12 LAB
% BANDS: 6 % (ref 0–9)
% LYMPHS: 13 % (ref 24–48)
% MONOS: 6 % (ref 0–10)
% SEGS: 75 % (ref 35–66)
ALBUMIN SERPL-MCNC: 2.9 G/DL (ref 3.4–5)
ALBUMIN/GLOB SERPL: 0.7 {RATIO} (ref 1–1.7)
ALP SERPL-CCNC: 137 U/L (ref 46–116)
ALT SERPL-CCNC: 66 U/L (ref 16–63)
ANION GAP SERPL CALC-SCNC: 9 MMOL/L (ref 6–14)
APTT BLD: 34 SEC (ref 24–38)
APTT PPP: YELLOW S
AST SERPL-CCNC: 25 U/L (ref 15–37)
BACTERIA #/AREA URNS HPF: (no result) /HPF
BASOPHILS # BLD AUTO: 0.1 X10^3/UL (ref 0–0.2)
BASOPHILS NFR BLD: 1 % (ref 0–3)
BILIRUB SERPL-MCNC: 0.8 MG/DL (ref 0.2–1)
BILIRUB UR QL STRIP: NEGATIVE
BUN SERPL-MCNC: 11 MG/DL (ref 8–26)
BUN/CREAT SERPL: 8 (ref 6–20)
CALCIUM SERPL-MCNC: 7.9 MG/DL (ref 8.5–10.1)
CHLORIDE SERPL-SCNC: 99 MMOL/L (ref 98–107)
CK SERPL-CCNC: 80 U/L (ref 39–308)
CO2 SERPL-SCNC: 27 MMOL/L (ref 21–32)
CREAT SERPL-MCNC: 1.3 MG/DL (ref 0.7–1.3)
EOSINOPHIL NFR BLD: 0 % (ref 0–3)
EOSINOPHIL NFR BLD: 0.1 X10^3/UL (ref 0–0.7)
ERYTHROCYTE [DISTWIDTH] IN BLOOD BY AUTOMATED COUNT: 15.3 % (ref 11.5–14.5)
FIBRINOGEN PPP-MCNC: (no result) MG/DL
GFR SERPLBLD BASED ON 1.73 SQ M-ARVRAT: 73.3 ML/MIN
GLUCOSE SERPL-MCNC: 342 MG/DL (ref 70–99)
HCT VFR BLD CALC: 39.4 % (ref 39–53)
HGB BLD-MCNC: 12.7 G/DL (ref 13–17.5)
HYPOCHROMIA BLD QL SMEAR: SLIGHT
LIPASE: 34 U/L (ref 73–393)
LYMPHOCYTES # BLD: 1.1 X10^3/UL (ref 1–4.8)
LYMPHOCYTES NFR BLD AUTO: 6 % (ref 24–48)
MAGNESIUM SERPL-MCNC: 1.6 MG/DL (ref 1.8–2.4)
MCH RBC QN AUTO: 25 PG (ref 25–35)
MCHC RBC AUTO-ENTMCNC: 32 G/DL (ref 31–37)
MCV RBC AUTO: 77 FL (ref 79–100)
MONO #: 1.6 X10^3/UL (ref 0–1.1)
MONOCYTES NFR BLD: 8 % (ref 0–9)
NEUT #: 17.4 X10^3/UL (ref 1.8–7.7)
NEUTROPHILS NFR BLD AUTO: 86 % (ref 31–73)
NITRITE UR QL STRIP: NEGATIVE
PH UR STRIP: 7 [PH]
PLATELET # BLD AUTO: 334 X10^3/UL (ref 140–400)
PLATELET # BLD EST: ADEQUATE 10*3/UL
POLYCHROMASIA BLD QL SMEAR: SLIGHT
POTASSIUM SERPL-SCNC: 4.7 MMOL/L (ref 3.5–5.1)
PROT SERPL-MCNC: 6.8 G/DL (ref 6.4–8.2)
PROT UR STRIP-MCNC: 100 MG/DL
PROTHROMBIN TIME: 13.1 SEC (ref 11.7–14)
RBC # BLD AUTO: 5.14 X10^6/UL (ref 4.3–5.7)
RBC #/AREA URNS HPF: (no result) /HPF (ref 0–2)
SODIUM SERPL-SCNC: 135 MMOL/L (ref 136–145)
UROBILINOGEN UR-MCNC: 1 MG/DL
WBC # BLD AUTO: 20.2 X10^3/UL (ref 4–11)
WBC #/AREA URNS HPF: (no result) /HPF (ref 0–4)

## 2021-04-12 PROCEDURE — 96361 HYDRATE IV INFUSION ADD-ON: CPT

## 2021-04-12 PROCEDURE — 96375 TX/PRO/DX INJ NEW DRUG ADDON: CPT

## 2021-04-12 PROCEDURE — 80048 BASIC METABOLIC PNL TOTAL CA: CPT

## 2021-04-12 PROCEDURE — G0378 HOSPITAL OBSERVATION PER HR: HCPCS

## 2021-04-12 PROCEDURE — 82962 GLUCOSE BLOOD TEST: CPT

## 2021-04-12 PROCEDURE — 71275 CT ANGIOGRAPHY CHEST: CPT

## 2021-04-12 PROCEDURE — 81001 URINALYSIS AUTO W/SCOPE: CPT

## 2021-04-12 PROCEDURE — 71045 X-RAY EXAM CHEST 1 VIEW: CPT

## 2021-04-12 PROCEDURE — 85007 BL SMEAR W/DIFF WBC COUNT: CPT

## 2021-04-12 PROCEDURE — 93005 ELECTROCARDIOGRAM TRACING: CPT

## 2021-04-12 PROCEDURE — 76705 ECHO EXAM OF ABDOMEN: CPT

## 2021-04-12 PROCEDURE — 83605 ASSAY OF LACTIC ACID: CPT

## 2021-04-12 PROCEDURE — 85730 THROMBOPLASTIN TIME PARTIAL: CPT

## 2021-04-12 PROCEDURE — 85610 PROTHROMBIN TIME: CPT

## 2021-04-12 PROCEDURE — 74177 CT ABD & PELVIS W/CONTRAST: CPT

## 2021-04-12 PROCEDURE — 87077 CULTURE AEROBIC IDENTIFY: CPT

## 2021-04-12 PROCEDURE — 94618 PULMONARY STRESS TESTING: CPT

## 2021-04-12 PROCEDURE — 85025 COMPLETE CBC W/AUTO DIFF WBC: CPT

## 2021-04-12 PROCEDURE — 82553 CREATINE MB FRACTION: CPT

## 2021-04-12 PROCEDURE — 36415 COLL VENOUS BLD VENIPUNCTURE: CPT

## 2021-04-12 PROCEDURE — 83735 ASSAY OF MAGNESIUM: CPT

## 2021-04-12 PROCEDURE — 87086 URINE CULTURE/COLONY COUNT: CPT

## 2021-04-12 PROCEDURE — 80076 HEPATIC FUNCTION PANEL: CPT

## 2021-04-12 PROCEDURE — 83550 IRON BINDING TEST: CPT

## 2021-04-12 PROCEDURE — 84484 ASSAY OF TROPONIN QUANT: CPT

## 2021-04-12 PROCEDURE — 80053 COMPREHEN METABOLIC PANEL: CPT

## 2021-04-12 PROCEDURE — 96365 THER/PROPH/DIAG IV INF INIT: CPT

## 2021-04-12 PROCEDURE — 87186 SC STD MICRODIL/AGAR DIL: CPT

## 2021-04-12 PROCEDURE — U0003 INFECTIOUS AGENT DETECTION BY NUCLEIC ACID (DNA OR RNA); SEVERE ACUTE RESPIRATORY SYNDROME CORONAVIRUS 2 (SARS-COV-2) (CORONAVIRUS DISEASE [COVID-19]), AMPLIFIED PROBE TECHNIQUE, MAKING USE OF HIGH THROUGHPUT TECHNOLOGIES AS DESCRIBED BY CMS-2020-01-R: HCPCS

## 2021-04-12 PROCEDURE — 83540 ASSAY OF IRON: CPT

## 2021-04-12 PROCEDURE — 96368 THER/DIAG CONCURRENT INF: CPT

## 2021-04-12 PROCEDURE — 87205 SMEAR GRAM STAIN: CPT

## 2021-04-12 PROCEDURE — 87804 INFLUENZA ASSAY W/OPTIC: CPT

## 2021-04-12 PROCEDURE — 83690 ASSAY OF LIPASE: CPT

## 2021-04-12 PROCEDURE — 87040 BLOOD CULTURE FOR BACTERIA: CPT

## 2021-04-12 NOTE — EKG
[facility]

              [address] [place] [address]

Test Date:    2021               Test Time:    21:00:08

Pat Name:     TERESA SAMSON         Department:   

Patient ID:   [id number]           Room:          

Gender:       M                        Technician:   

:          1980               Requested By: MEGHA WADDELL

Order Number: [id number]           Reading MD:     

                                 Measurements

Intervals                              Axis          

Rate:         122                      P:            38

OH:           154                      QRS:          4

QRSD:         82                       T:            25

QT:           292                                    

QTc:          417                                    

                           Interpretive Statements

SINUS TACHYCARDIA

OTHERWISE NORMAL ECG

RI6.02

No previous ECG available for comparison

## 2021-04-12 NOTE — RAD
Exam: CT of chest, abdomen and pelvis with contrast



INDICATION: Flank pain, shortness of breath



TECHNIQUE: Sequential axial images through the chest, abdomen and pelvis obtained following the admin
istration of 100 mL of Omni 350 IV contrast. Sagittal and coronal reformatted images were reconstruct
ed from the axial data and reviewed.



Comparisons: None



FINDINGS:

Visualized portions of the thyroid are unremarkable. No enlarged mediastinal lymph nodes.



Heart size is normal. No pericardial effusion. Thoracic aorta has a normal course and caliber. Pulmon
hipolito artery is not enlarged. Airways are patent. No consolidation or pneumothorax. No suspicious lung 
nodules are identified.



No pleural effusion or thickening



Liver, spleen, pancreas, and adrenals are unremarkable. Gallstones are noted within the gallbladder.



Subtle right perinephric stranding. No hydronephrosis. No renal or ureteral calculi are identified.



Bladder is decompressed not well evaluated. Prostate is not enlarged.



Large and small bowel are unremarkable. Appendix is normal. No free intra-abdominal air or fluid. No 
obstruction.



Abdominal aorta has a normal course and caliber. Abdominal vasculature is patent.



No enlarged intra-abdominal lymph nodes are identified.



No suspicious osseous lesions or acute fractures.



IMPRESSION:

1.  Markedly Limited evaluation secondary to body habitus and movement at time of contrast bolus ty
ng. Examination is nondiagnostic for pulmonary embolus.

2.  Essentially a noncontrast CT of the chest was performed. No abnormalities are identified in the c
hest.

3.  Gallstones and within the gallbladder. Correlate with symptomatology.

4.  Subtle right perinephric stranding. Correlate with urinalysis for infection.





Exposure: One or more of the following in the visualized dose reduction techniques were utilized for 
this examination:

1.  Automated exposure control

2.  Adjustment of the MA and/or KV according to patient size

3.  Use of iterative of reconstructive technique



Electronically signed by: Loren Loomis MD (4/12/2021 11:29 PM) [facility]

## 2021-04-13 VITALS — DIASTOLIC BLOOD PRESSURE: 79 MMHG | SYSTOLIC BLOOD PRESSURE: 146 MMHG

## 2021-04-13 VITALS — DIASTOLIC BLOOD PRESSURE: 77 MMHG | SYSTOLIC BLOOD PRESSURE: 140 MMHG

## 2021-04-13 VITALS — SYSTOLIC BLOOD PRESSURE: 143 MMHG | DIASTOLIC BLOOD PRESSURE: 79 MMHG

## 2021-04-13 VITALS — SYSTOLIC BLOOD PRESSURE: 139 MMHG | DIASTOLIC BLOOD PRESSURE: 86 MMHG

## 2021-04-13 VITALS — SYSTOLIC BLOOD PRESSURE: 171 MMHG | DIASTOLIC BLOOD PRESSURE: 100 MMHG

## 2021-04-13 VITALS — DIASTOLIC BLOOD PRESSURE: 75 MMHG | SYSTOLIC BLOOD PRESSURE: 126 MMHG

## 2021-04-13 LAB
INFLUENZA A PATIENT: NEGATIVE
INFLUENZA B PATIENT: NEGATIVE

## 2021-04-13 RX ADMIN — INSULIN LISPRO SCH UNITS: 100 INJECTION, SOLUTION INTRAVENOUS; SUBCUTANEOUS at 08:27

## 2021-04-13 RX ADMIN — INSULIN LISPRO SCH UNITS: 100 INJECTION, SOLUTION INTRAVENOUS; SUBCUTANEOUS at 12:26

## 2021-04-13 RX ADMIN — ENOXAPARIN SODIUM SCH MG: 100 INJECTION SUBCUTANEOUS at 08:30

## 2021-04-13 RX ADMIN — PIPERACILLIN SODIUM AND TAZOBACTAM SODIUM SCH MLS/HR: 3; .375 INJECTION, POWDER, LYOPHILIZED, FOR SOLUTION INTRAVENOUS at 12:34

## 2021-04-13 RX ADMIN — HYDROCODONE BITARTRATE AND ACETAMINOPHEN PRN TAB: 5; 325 TABLET ORAL at 09:08

## 2021-04-13 RX ADMIN — INSULIN LISPRO SCH UNITS: 100 INJECTION, SOLUTION INTRAVENOUS; SUBCUTANEOUS at 17:45

## 2021-04-13 RX ADMIN — FLUTICASONE FUROATE AND VILANTEROL TRIFENATATE SCH PUFF: 100; 25 POWDER RESPIRATORY (INHALATION) at 08:32

## 2021-04-13 RX ADMIN — INSULIN GLARGINE SCH UNIT: 100 INJECTION, SOLUTION SUBCUTANEOUS at 21:19

## 2021-04-13 RX ADMIN — BACITRACIN SCH MLS/HR: 5000 INJECTION, POWDER, FOR SOLUTION INTRAMUSCULAR at 11:00

## 2021-04-13 RX ADMIN — ATORVASTATIN CALCIUM SCH MG: 40 TABLET, FILM COATED ORAL at 21:17

## 2021-04-13 RX ADMIN — FLUTICASONE PROPIONATE SCH SPRAY: 50 SPRAY, METERED NASAL at 09:00

## 2021-04-13 RX ADMIN — BACITRACIN SCH MLS/HR: 5000 INJECTION, POWDER, FOR SOLUTION INTRAMUSCULAR at 17:38

## 2021-04-13 RX ADMIN — INSULIN LISPRO SCH UNITS: 100 INJECTION, SOLUTION INTRAVENOUS; SUBCUTANEOUS at 08:26

## 2021-04-13 RX ADMIN — MONTELUKAST SODIUM SCH MG: 10 TABLET, FILM COATED ORAL at 21:16

## 2021-04-13 RX ADMIN — BACITRACIN SCH MLS/HR: 5000 INJECTION, POWDER, FOR SOLUTION INTRAMUSCULAR at 03:59

## 2021-04-13 RX ADMIN — PIPERACILLIN SODIUM AND TAZOBACTAM SODIUM SCH MLS/HR: 3; .375 INJECTION, POWDER, LYOPHILIZED, FOR SOLUTION INTRAVENOUS at 17:38

## 2021-04-13 RX ADMIN — PIPERACILLIN SODIUM AND TAZOBACTAM SODIUM SCH MLS/HR: 3; .375 INJECTION, POWDER, LYOPHILIZED, FOR SOLUTION INTRAVENOUS at 23:34

## 2021-04-13 RX ADMIN — HYDROCODONE BITARTRATE AND ACETAMINOPHEN PRN TAB: 5; 325 TABLET ORAL at 23:34

## 2021-04-13 RX ADMIN — ENOXAPARIN SODIUM SCH MG: 100 INJECTION SUBCUTANEOUS at 21:16

## 2021-04-13 RX ADMIN — HYDROCODONE BITARTRATE AND ACETAMINOPHEN PRN TAB: 5; 325 TABLET ORAL at 18:02

## 2021-04-13 RX ADMIN — DEXAMETHASONE SODIUM PHOSPHATE SCH MG: 4 INJECTION, SOLUTION INTRAMUSCULAR; INTRAVENOUS at 08:32

## 2021-04-13 RX ADMIN — INSULIN LISPRO SCH UNITS: 100 INJECTION, SOLUTION INTRAVENOUS; SUBCUTANEOUS at 17:44

## 2021-04-13 RX ADMIN — INSULIN GLARGINE SCH UNIT: 100 INJECTION, SOLUTION SUBCUTANEOUS at 08:40

## 2021-04-13 RX ADMIN — ASPIRIN 81 MG SCH MG: 81 TABLET ORAL at 08:30

## 2021-04-13 RX ADMIN — LOSARTAN POTASSIUM SCH MG: 50 TABLET ORAL at 08:31

## 2021-04-13 NOTE — PHYS DOC
Past Medical History


Past Medical History:  Asthma, Diabetes-Type II, High Cholesterol, Hypertension


Additional Past Medical Histor:  neuropathy, BC


Past Surgical History:  Other


Additional Past Surgical Histo:  L SHOULDER ORIF


Smoking Status:  Former Smoker


Alcohol Use:  None


Drug Use:  Marijuana





General Adult


EDM:


Chief Complaint:  DYSPNEA/RESPIRATORY DISTRESS





HPI:


HPI:





Patient is a 40  year old [f__sex] who presents with []





Review of Systems:


Review of Systems:


Constitutional:   Denies fever or chills. []


Eyes:   Denies change in visual acuity. []


HENT:   Denies nasal congestion or sore throat. [] 


Respiratory:   Denies cough or shortness of breath. [] 


Cardiovascular:   Denies chest pain or edema. [] 


GI:   Denies abdominal pain, nausea, vomiting, bloody stools or diarrhea. [] 


:  Denies dysuria. [] 


Musculoskeletal:   Denies back pain or joint pain. [] 


Integument:   Denies rash. [] 


Neurologic:   Denies headache, focal weakness or sensory changes. [] 


Endocrine:   Denies polyuria or polydipsia. [] 


Lymphatic:  Denies swollen glands. [] 


Psychiatric:  Denies depression or anxiety. []





Heart Score:


Risk Factors:


Risk Factors:  DM, Current or recent (<one month) smoker, HTN, HLP, family 

history of CAD, obesity.


Risk Scores:


Score 0 - 3:  2.5% MACE over next 6 weeks - Discharge Home


Score 4 - 6:  20.3% MACE over next 6 weeks - Admit for Clinical Observation


Score 7 - 10:  72.7% MACE over next 6 weeks - Early Invasive Strategies





Current Medications:





Current Medications








 Medications


  (Trade)  Dose


 Ordered  Sig/Pratik  Start Time


 Stop Time Status Last Admin


Dose Admin


 


 Acetaminophen


  (Tylenol)  500 mg  1X  ONCE  4/12/21 22:00


 4/12/21 22:01 DC 4/12/21 21:54


500 MG


 


 Ceftriaxone Sodium


  (Rocephin)  1 gm  1X  ONCE  4/12/21 22:30


 4/12/21 22:20 DC  





 


 Dexamethasone


 Sodium Phosphate


  (Decadron)  10 mg  1X  ONCE  4/12/21 22:00


 4/12/21 22:01 DC 4/12/21 21:55


10 MG


 


 Fentanyl Citrate


  (Fentanyl 2ml


 Vial)  50 mcg  1X  ONCE  4/12/21 22:00


 4/12/21 22:01 DC 4/12/21 21:54


50 MCG


 


 Info


  (CONTRAST GIVEN


 -- Rx MONITORING)  1 each  PRN DAILY  PRN  4/12/21 23:15


 4/14/21 23:14   





 


 Iohexol


  (Omnipaque 350


 Mg/ml)  100 ml  1X  ONCE  4/12/21 23:30


 4/12/21 23:31 DC  





 


 Levofloxacin/


 Dextrose  150 ml @ 


 100 mls/hr  1X  ONCE  4/12/21 23:00


 4/13/21 00:29 DC 4/13/21 00:22


100 MLS/HR


 


 Morphine Sulfate


  (Morphine


 Sulfate)  4 mg  1X  ONCE  4/13/21 00:30


 4/13/21 00:31 DC 4/13/21 00:21


4 MG


 


 Ondansetron HCl


  (Zofran)  4 mg  1X  ONCE  4/12/21 22:00


 4/12/21 22:01 DC 4/12/21 21:54


4 MG


 


 Piperacillin Sod/


 Tazobactam Sod


 4.5 gm/Sodium


 Chloride  100 ml @ 


 200 mls/hr  1X  ONCE  4/12/21 23:00


 4/12/21 23:29 DC 4/13/21 00:15


200 MLS/HR


 


 Sodium Chloride  500 ml @ 


 500 mls/hr  1X  ONCE  4/13/21 00:30


 4/13/21 01:29 DC  














Allergies:


Allergies:





Allergies








Coded Allergies Type Severity Reaction Last Updated Verified


 


  Sulfa (Sulfonamide Antibiotics) Allergy Intermediate  3/30/16 Yes


 


  adhesive tape Allergy Intermediate skin sensitive to tape 3/30/16 Yes











Physical Exam:


PE:





Constitutional: Well developed, well nourished, no acute distress, non-toxic 

appearance. []


HENT: Normocephalic, atraumatic, bilateral external ears normal, oropharynx 

moist, no oral exudates, nose normal. []


Eyes: PERRLA, EOMI, conjunctiva normal, no discharge. [] 


Neck: Normal range of motion, no tenderness, supple, no stridor. [] 


Cardiovascular:Heart rate regular rhythm, no murmur []


Lungs & Thorax:  Bilateral breath sounds clear to auscultation []


Abdomen: Bowel sounds normal, soft, no tenderness, no masses, no pulsatile 

masses. [] 


Skin: Warm, dry, no erythema, no rash. [] 


Back: No tenderness, no CVA tenderness. [] 


Extremities: No tenderness, no cyanosis, no clubbing, ROM intact, no edema. [] 


Neurologic: Alert and oriented X 3, normal motor function, normal sensory 

function, no focal deficits noted. []


Psychologic: Affect normal, judgement normal, mood normal. []





Current Patient Data:


Labs:





                                Laboratory Tests








Test


 4/12/21


21:10 4/12/21


21:25 4/12/21


21:35 4/13/21


00:40


 


White Blood Count


 20.2 x10^3/uL


(4.0-11.0)  H 


 


 





 


Red Blood Count


 5.14 x10^6/uL


(4.30-5.70) 


 


 





 


Hemoglobin


 12.7 g/dL


(13.0-17.5)  L 


 


 





 


Hematocrit


 39.4 %


(39.0-53.0) 


 


 





 


Mean Corpuscular Volume


 77 fL ()


L 


 


 





 


Mean Corpuscular Hemoglobin 25 pg (25-35)     


 


Mean Corpuscular Hemoglobin


Concent 32 g/dL


(31-37) 


 


 





 


Red Cell Distribution Width


 15.3 %


(11.5-14.5)  H 


 


 





 


Platelet Count


 334 x10^3/uL


(140-400) 


 


 





 


Neutrophils (%) (Auto) 86 % (31-73)  H   


 


Lymphocytes (%) (Auto) 6 % (24-48)  L   


 


Monocytes (%) (Auto) 8 % (0-9)     


 


Eosinophils (%) (Auto) 0 % (0-3)     


 


Basophils (%) (Auto) 1 % (0-3)     


 


Neutrophils # (Auto)


 17.4 x10^3/uL


(1.8-7.7)  H 


 


 





 


Lymphocytes # (Auto)


 1.1 x10^3/uL


(1.0-4.8) 


 


 





 


Monocytes # (Auto)


 1.6 x10^3/uL


(0.0-1.1)  H 


 


 





 


Eosinophils # (Auto)


 0.1 x10^3/uL


(0.0-0.7) 


 


 





 


Basophils # (Auto)


 0.1 x10^3/uL


(0.0-0.2) 


 


 





 


Segmented Neutrophils % 75 % (35-66)  H   


 


Band Neutrophils % 6 % (0-9)     


 


Lymphocytes % 13 % (24-48)  L   


 


Monocytes % 6 % (0-10)     


 


Platelet Estimate


 Adequate


(ADEQUATE) 


 


 





 


Polychromasia Slight     


 


Hypochromasia Slight     


 


Prothrombin Time


 13.1 SEC


(11.7-14.0) 


 


 





 


Prothrombin Time INR 1.0 (0.8-1.1)     


 


Activated Partial


Thromboplast Time 34 SEC (24-38)


 


 


 





 


Lactic Acid Level


 4.1 mmol/L


(0.4-2.0)  *H 


 


 1.0 mmol/L


(0.4-2.0)


 


Sodium Level


 


 135 mmol/L


(136-145)  L 


 





 


Potassium Level


 


 4.7 mmol/L


(3.5-5.1) 


 





 


Chloride Level


 


 99 mmol/L


() 


 





 


Carbon Dioxide Level


 


 27 mmol/L


(21-32) 


 





 


Anion Gap  9 (6-14)    


 


Blood Urea Nitrogen


 


 11 mg/dL


(8-26) 


 





 


Creatinine


 


 1.3 mg/dL


(0.7-1.3) 


 





 


Estimated GFR


(Cockcroft-Gault) 


 73.3  


 


 





 


BUN/Creatinine Ratio  8 (6-20)    


 


Glucose Level


 


 342 mg/dL


(70-99)  H 


 





 


Calcium Level


 


 7.9 mg/dL


(8.5-10.1)  L 


 





 


Magnesium Level


 


 1.6 mg/dL


(1.8-2.4)  L 


 





 


Total Bilirubin


 


 0.8 mg/dL


(0.2-1.0) 


 





 


Aspartate Amino Transferase


(AST) 


 25 U/L (15-37)


 


 





 


Alanine Aminotransferase (ALT)


 


 66 U/L (16-63)


H 


 





 


Alkaline Phosphatase


 


 137 U/L


()  H 


 





 


Creatine Kinase


 


 80 U/L


() 


 





 


Creatine Kinase MB (Mass)


 


 1.2 ng/mL


(0.0-3.6) 


 





 


Creatine Kinase MB Relative


Index 


 1.5 % (0-4)  


 


 





 


Troponin I Quantitative


 


 < 0.017 ng/mL


(0.000-0.055) 


 





 


Total Protein


 


 6.8 g/dL


(6.4-8.2) 


 





 


Albumin


 


 2.9 g/dL


(3.4-5.0)  L 


 





 


Albumin/Globulin Ratio


 


 0.7 (1.0-1.7)


L 


 





 


Lipase


 


 34 U/L


()  L 


 





 


Urine Collection Type   Void   


 


Urine Color   Yellow   


 


Urine Clarity   Cloudy   


 


Urine pH


 


 


 7.0 (<5.0-8.0)


 





 


Urine Specific Gravity


 


 


 1.020


(1.000-1.030) 





 


Urine Protein


 


 


 100 mg/dL


(NEG-TRACE) 





 


Urine Glucose (UA)


 


 


 >=1000 mg/dL


(NEG) 





 


Urine Ketones (Stick)


 


 


 15 mg/dL (NEG)


 





 


Urine Blood


 


 


 Moderate (NEG)


 





 


Urine Nitrite


 


 


 Negative (NEG)


 





 


Urine Bilirubin


 


 


 Negative (NEG)


 





 


Urine Urobilinogen Dipstick


 


 


 1.0 mg/dL (0.2


mg/dL) 





 


Urine Leukocyte Esterase   Large (NEG)   


 


Urine RBC


 


 


 Occ /HPF (0-2)


 





 


Urine WBC


 


 


 Tntc /HPF


(0-4) 





 


Urine Squamous Epithelial


Cells 


 


 Few /LPF  


 





 


Urine Bacteria


 


 


 Many /HPF


(0-FEW) 





 


Urine Mucus   Mod /LPF   





                                Laboratory Tests


4/12/21 21:10








                                Laboratory Tests


4/12/21 21:25








Vital Signs:





                                   Vital Signs








  Date Time  Temp Pulse Resp B/P (MAP) Pulse Ox O2 Delivery O2 Flow Rate FiO2


 


4/13/21 00:21   22  97 Nasal Cannula 3.0 


 


4/13/21 00:02 100.0 107  162/83 (109)    





 100.0       











EKG:


EKG:


@2100 Sinus tachycardia at 122bpm, NO ST elevation, QRS 82ms, QT/QTc 292/417ms





Radiology/Procedures:


Radiology/Procedures:


[]





Course & Med Decision Making:


Course & Med Decision Making


Pertinent Labs and Imaging studies reviewed. (See chart for details)





[]





Dragon Disclaimer:


Dragon Disclaimer:


This electronic medical record was generated, in whole or in part, using a voice

 recognition dictation system.





Departure


Departure


Impression:  


   Primary Impression:  


   Septic shock


   Additional Impressions:  


   Suspected 2019 novel coronavirus infection


   Pyelonephritis


   Hypomagnesemia


Disposition:  09 ADMITTED AS INPATIENT


Admitting Physician:  HIMS (Coley)


Condition:  GUARDED


Referrals:  


NO PCP (PCP)





Vital Signs


Vital Signs:





Vital Signs








  Date Time  Temp Pulse Resp B/P (MAP) Pulse Ox O2 Delivery O2 Flow Rate FiO2


 


4/13/21 00:21   22  97 Nasal Cannula 3.0 


 


4/13/21 00:02 100.0 107  162/83 (109)    





 100.0       








Temperature Source:  Oral





Peripheral Pulse


Pulse Assessment Method:  Monitor





Critical Care Time


Critical care time was 30 minutes which includes time at bedside, spent in 

discussion of patient's care with specialists and/or family members, with 

interpretation of laboratory and/or radiological studies and is exclusive of 

procedures.











MEGHA WADDELL DO             Apr 13, 2021 02:19

## 2021-04-13 NOTE — NUR
GISEL following for discharge planning today. GISEL spoke with RN and reviewed chart. Pt from 
home. Pt on room air and IV Zosyn. Consult to pulmonary. SW following.

## 2021-04-13 NOTE — CONS
DATE OF CONSULTATION:  04/13/2021



PULMONARY CONSULTATION



ATTENDING PHYSICIAN:  Dr. Jhony Coley.



REASON FOR CONSULTATION:  Respiratory failure.



HISTORY OF PRESENT ILLNESS:  The patient is a 40-year-old morbidly obese male

with a BMI of 66.  He has history of asthma.  He denies any tobacco use.  He is

not on home oxygen.  He has type 2 diabetes.  He was also diagnosed with BC and

has been waiting on a CPAP.



He was brought into the hospital with nausea, some vomiting and diarrhea and

body aches.  He said he has some stomach pain as well.  He had fever and chills.

 The patient had a fever of 103 on admission.  The patient underwent CT chest

with contrast.  It was a ____ study to rule out pulmonary embolism.  No definite

infiltrates were seen.  The patient did have right perinephric stranding and

there was a suspicion for gallstone.  I have been asked to see him for further

evaluation.  He is currently being under investigation for COVID-19.



PAST MEDICAL HISTORY:  Significant for hypertension, asthma, morbid obesity with

a BMI of 66, history of diabetes, history of obstructive sleep apnea, awaiting

CPAP.



PAST SURGICAL HISTORY:  Left shoulder surgery.



FAMILY HISTORY:  Diabetes, dyslipidemia and hypertension.



SOCIAL HISTORY:  Denies significant tobacco history.



ALLERGIES:  SULFA AND ADHESIVE TAPE.



REVIEW OF SYSTEMS:  Twelve-point system obtained.  Pertinent positives discussed

in my history of present illness, otherwise noncontributory.  All systems that

were negative were reviewed as well.



FAMILY HISTORY:  Noncontributory to lungs.



MEDICATIONS: All reviewed as listed in MRAD



PHYSICAL EXAMINATION:

VITAL SIGNS:  Shows a T-max of 103, pulse ox is 98% on 3 liters.

NECK:  Supple.

LUNGS:  With diminished breath sounds.

CARDIOVASCULAR:  With a regular rate.

ABDOMEN:  Soft, obese.

EXTREMITIES:  With trace pitting edema.



LABORATORY DATA:  Reviewed.  White cell count 20.2, hemoglobin 12.7 and

platelets are 334.  BUN 11 and creatinine of 1.3.  Lactic acid is 4.1.



IMPRESSION:

1.  Acute hypoxic respiratory failure with high-grade fever.  No definite

infiltrates seen on the CT chest.  The possibility of acute cholecystitis would

be a consideration.

2.  No significant tobacco history.

3.  Morbid obesity with underlying sleep apnea.  He is awaiting CPAP,  details

of his sleep study not available.

4.  Influenza screen negative.  COVID-19 pending.

5.  Leukocytosis.



RECOMMENDATIONS:

1.  Continue present oxygen to keep saturation 92 and above.

2.  Consider GI consult.

3.  Rule out COVID-19.

4.  Continue broad-spectrum antibiotics.

5.  Lovenox for DVT prophylaxis.

6.  The patient was initiated on dexamethasone.  If COVID tests come back

negative, then we will discontinue steroids.  Discussed with RN.

 



______________________________

MOLLY DIANA MD



DR:  CHLOE/jairo  JOB#:  [id number] / [id number]

DD:  04/13/2021 13:10  DT:  04/13/2021 13:28

MICHELE

## 2021-04-13 NOTE — PDOC1
History and Physical


Date of Admission


Date of Admission


DATE: 4/13/21 


TIME: 07:18





Identification/Chief Complaint


Chief Complaint


Shortness of breath





Source


Source:  Chart review, Patient





History of Present Illness


History of Present Illness


Patient is a 40 old male with past medical history asthma, hypertension, DM2, 

presents to the ER with complaints of worsening shortness of breath over the 

past 2 days.  Reports associated nausea, vomiting, diarrhea, body aches, fever, 

chills, and polyuria.  He has been taking his home albuterol inhaler, symptoms 

acutely worsened when he ran out with this medication.  Upon arrival to the ED 

he was tachypnea, febrile, saturating 99% on 5 L nasal cannula.  CT 

chest/abdomen/pelvis obtained on admission showed subtle right perinephric 

stranding; gallstones, no PE or chest abnormality.  Will admit patient for 

further medical management.





WBC 20.2, lactic acid 4.1, 





Past Medical History


Cardiovascular:  HTN


Pulmonary:  Asthma, Bronchitis


GI:  No pertinent hx


Heme/Onc:  No pertinent hx


Hepatobiliary:  No pertinent hx


Psych:  No pertinent hx


Rheumatologic:  No pertinent hx


Infectious disease:  No pertinent hx


Renal/:  No pertinent hx


Endocrine:  Diabetes





Past Surgical History


Past Surgical History


Left shoulder surgery


Past Surgical History:  Other





Family History


Family History:  Diabetes, High Cholestrol, Hypertension





Social History


Smoke:  Quit


ALCOHOL:  none


Drugs:  None





Current Problem List


Problem List


Problems


Medical Problems:


(1) Hypomagnesemia


Status: Acute  





(2) Pyelonephritis


Status: Acute  





(3) Septic shock


Status: Acute  





(4) Suspected 2019 novel coronavirus infection


Status: Acute  











Current Medications


Current Medications





Current Medications


Sodium Chloride 1,000 ml @  1,000 mls/hr Q1H IV  Last administered on 4/12/21at 

21:52;  Start 4/12/21 at 22:00;  Stop 4/12/21 at 22:59;  Status DC


Fentanyl Citrate (Fentanyl 2ml Vial) 50 mcg 1X  ONCE IVP  Last administered on 

4/12/21at 21:54;  Start 4/12/21 at 22:00;  Stop 4/12/21 at 22:01;  Status DC


Ondansetron HCl (Zofran) 4 mg 1X  ONCE IVP  Last administered on 4/12/21at 

21:54;  Start 4/12/21 at 22:00;  Stop 4/12/21 at 22:01;  Status DC


Acetaminophen (Tylenol) 500 mg 1X  ONCE PO  Last administered on 4/12/21at 

21:54;  Start 4/12/21 at 22:00;  Stop 4/12/21 at 22:01;  Status DC


Dexamethasone Sodium Phosphate (Decadron) 10 mg 1X  ONCE IVP  Last administered 

on 4/12/21at 21:55;  Start 4/12/21 at 22:00;  Stop 4/12/21 at 22:01;  Status DC


Ceftriaxone Sodium (Rocephin) 1 gm 1X  ONCE IVP ;  Start 4/12/21 at 22:30;  Stop

4/12/21 at 22:20;  Status DC


Levofloxacin/ Dextrose 150 ml @  100 mls/hr 1X  ONCE IV  Last administered on 

4/13/21at 00:22;  Start 4/12/21 at 23:00;  Stop 4/13/21 at 00:29;  Status DC


Piperacillin Sod/ Tazobactam Sod 4.5 gm/Sodium Chloride 100 ml @  200 mls/hr 1X 

ONCE IV  Last administered on 4/13/21at 00:15;  Start 4/12/21 at 23:00;  Stop 

4/12/21 at 23:29;  Status DC


Iohexol (Omnipaque 350 Mg/ml) 100 ml 1X  ONCE IV ;  Start 4/12/21 at 23:30;  

Stop 4/12/21 at 23:31;  Status DC


Info (CONTRAST GIVEN -- Rx MONITORING) 1 each PRN DAILY  PRN MC SEE COMMENTS;  

Start 4/12/21 at 23:15;  Stop 4/14/21 at 23:14


Morphine Sulfate (Morphine Sulfate) 4 mg 1X  ONCE IV  Last administered on 

4/13/21at 00:21;  Start 4/13/21 at 00:30;  Stop 4/13/21 at 00:31;  Status DC


Sodium Chloride 1,000 ml @  1,000 mls/hr 1X  ONCE IV  Last administered on 

4/13/21at 02:18;  Start 4/13/21 at 00:30;  Stop 4/13/21 at 01:29;  Status DC


Sodium Chloride 500 ml @  500 mls/hr 1X  ONCE IV  Last administered on 4/13/21at

03:20;  Start 4/13/21 at 00:30;  Stop 4/13/21 at 01:29;  Status DC


Ondansetron HCl (Zofran) 4 mg PRN Q8HRS  PRN IV NAUSEA/VOMITING 1ST CHOICE;  

Start 4/13/21 at 02:45;  Stop 4/14/21 at 02:44


Fentanyl Citrate (Fentanyl 2ml Vial) 50 mcg PRN Q2HRS  PRN IV SEVERE PAIN 7-10 

Last administered on 4/13/21at 03:21;  Start 4/13/21 at 02:45


Sodium Chloride 1,000 ml @  125 mls/hr Q8H IV  Last administered on 4/13/21at 

03:59;  Start 4/13/21 at 03:00;  Stop 4/14/21 at 02:59


Acetaminophen (Tylenol) 650 mg PRN Q4HRS  PRN PO FEVER > 100.3'F;  Start 4/13/21

at 02:45;  Stop 4/14/21 at 02:44


Insulin Human Lispro (HumaLOG) 0-5 UNITS TIDWMEALS SQ ;  Start 4/13/21 at 08:00


Dextrose (Dextrose 50%-Water Syringe) 12.5 gm PRN Q15MIN  PRN IV SEE COMMENTS;  

Start 4/13/21 at 02:45


Fluticasone/ Vilanterol (Breo Ellipta 100-25 Mcg) 1 puff DAILY INH ;  Start 

4/13/21 at 09:00


Aspirin (Aspirin Chewable) 81 mg DAILY PO ;  Start 4/13/21 at 09:00


Atorvastatin Calcium (Lipitor) 40 mg QHS PO ;  Start 4/13/21 at 21:00


Fluticasone Propionate (Flonase) 2 spray DAILY NS ;  Start 4/13/21 at 09:00


Losartan Potassium (Cozaar) 50 mg DAILY PO ;  Start 4/13/21 at 09:00


Montelukast Sodium (Singulair) 10 mg QHS PO ;  Start 4/13/21 at 21:00


Trazodone HCl (Desyrel) 100 mg PRN QHS  PRN PO INSOMNIA;  Start 4/13/21 at 07:15


Insulin Glargine (Lantus Syringe) 30 unit BID SQ ;  Start 4/13/21 at 09:00


Insulin Human Lispro (HumaLOG) 30 units TIDWMEALS SQ ;  Start 4/13/21 at 08:00


Dextrose (Dextrose 50%-Water Syringe) 12.5 gm PRN Q15MIN  PRN IV SEE COMMENTS;  

Start 4/13/21 at 07:15


Dextrose (Iv Dextrose 5%) 250 ml PRN Q15MIN  PRN IV SEE COMMENTS;  Start 4/13/21

at 07:15





Active Scripts


Active


Norco 5-325 Tablet (Acetaminophen/Hydrocodone Bitart) 1 Each Tablet 1 Tab PO PRN

Q6HRS PRN 5 Days


Pantoprazole Sodium  ** (Pantoprazole Sodium) 40 Mg Tablet.dr 40 Mg PO DAILYAC 

30 Days


Montelukast Sodium Tablet  ** (Montelukast Sodium) 10 Mg Tablet 10 Mg PO QHS 30 

Days


Cozaar ** (Losartan Potassium) 50 Mg Tablet 50 Mg PO DAILY 30 Days


Combivent Respimat Inhal (Ipratropium/Albuterol Sulfate) 4 Gm Aer.w.adap 2 Inh 

IH QID 30 Days


Prednisone 20 Mg Tablet 1 Tab PO DAILY 5 Days


Doxycycline Hyclate 100 Mg Tablet 100 Mg PO BID 7 Days


Sertraline Hcl 100 Mg Tablet 100 Mg PO DAILY 30 Days


Trazodone Hcl 100 Mg Tablet 100 Mg PO PRN QHS PRN 30 Days


Lantus Solostar (Insulin Glargine,Hum.rec.anlog) 100 Unit/1 Ml Insuln.pen 30 

Unit SQ BID 30 Days


Reported


Novolog (Insulin Aspart) 100 Unit/1 Ml Cartridge 30 Unit SQ TIDWMEALS


Atorvastatin Calcium 40 Mg Tablet 40 Mg PO QHS


Fluticasone Propionate Nasal Spray (Fluticasone Propionate) 16 Gm Spray.susp 2 

Spr ASIM DAILY


Aspirin 81 Mg Tab.chew 1 Tab PO DAILY





Allergies


Allergies:  


Coded Allergies:  


     Sulfa (Sulfonamide Antibiotics) (Verified  Allergy, Intermediate, 3/30/16)


     adhesive tape (Verified  Allergy, Intermediate, skin sensitive to tape, 

3/30/16)





ROS


Review of System


GENERAL: Body aches, fevers, chills.  No history of weight change.


SKIN:  No bruising, hair changes or rashes.


EYES:  No blurred, double or loss of vision.


NOSE AND THROAT:  No history of nosebleeds, hoarseness or sore throat.


HEART:  Denies chest pain, denies palpitations.


LUNGS: Shortness of breath. Denies hemoptysis.


GASTROINTESTINAL: Denies nausea, vomiting, abdominal pain.


GENITOURINARY: Polyuria.  Denies dysuria or hematuria.


NEUROLOGIC:  Denies history of numbness, tingling, tremor or weakness.


PSYCHIATRIC: Denies anxiety, denies depression.


ENDOCRINE:  No history of heat or cold intolerance, or polydipsia.


EXTREMITIES:  Denies muscle weakness, joint pain, pain on walking or stiffness.





Physical Exam


Physical Exam


General: Morbidly obese.  Alert, Oriented X3, Cooperative, mild distress


HEENT:  PERRLA, EOMI


Lungs: Decreased breath sounds bilaterally, Normal air movement


Heart:  RRR, no murmurs


Cardiovascular:  S1, S2


Abdomen:  Normal bowel sounds, Soft, bilateral flank tenderness, left greater 

than right.


Extremities:  No clubbing, No cyanosis


Skin:  No rashes, No significant lesion


Neuro:  Normal speech, Normal tone, Sensation intact


Psych/Mental Status:  Mental status NL, Mood NL





Vitals


Vitals





Vital Signs








  Date Time  Temp Pulse Resp B/P (MAP) Pulse Ox O2 Delivery O2 Flow Rate FiO2


 


4/13/21 04:48       3.0 


 


4/13/21 03:56 98.5 95 22 126/75 (92) 98 Room Air  





 98.5       











Labs


Labs





Laboratory Tests








Test


 4/12/21


21:10 4/12/21


21:25 4/12/21


21:35 4/13/21


00:40


 


White Blood Count


 20.2 x10^3/uL


(4.0-11.0) 


 


 





 


Red Blood Count


 5.14 x10^6/uL


(4.30-5.70) 


 


 





 


Hemoglobin


 12.7 g/dL


(13.0-17.5) 


 


 





 


Hematocrit


 39.4 %


(39.0-53.0) 


 


 





 


Mean Corpuscular Volume 77 fL ()    


 


Mean Corpuscular Hemoglobin 25 pg (25-35)    


 


Mean Corpuscular Hemoglobin


Concent 32 g/dL


(31-37) 


 


 





 


Red Cell Distribution Width


 15.3 %


(11.5-14.5) 


 


 





 


Platelet Count


 334 x10^3/uL


(140-400) 


 


 





 


Neutrophils (%) (Auto) 86 % (31-73)    


 


Lymphocytes (%) (Auto) 6 % (24-48)    


 


Monocytes (%) (Auto) 8 % (0-9)    


 


Eosinophils (%) (Auto) 0 % (0-3)    


 


Basophils (%) (Auto) 1 % (0-3)    


 


Neutrophils # (Auto)


 17.4 x10^3/uL


(1.8-7.7) 


 


 





 


Lymphocytes # (Auto)


 1.1 x10^3/uL


(1.0-4.8) 


 


 





 


Monocytes # (Auto)


 1.6 x10^3/uL


(0.0-1.1) 


 


 





 


Eosinophils # (Auto)


 0.1 x10^3/uL


(0.0-0.7) 


 


 





 


Basophils # (Auto)


 0.1 x10^3/uL


(0.0-0.2) 


 


 





 


Segmented Neutrophils % 75 % (35-66)    


 


Band Neutrophils % 6 % (0-9)    


 


Lymphocytes % 13 % (24-48)    


 


Monocytes % 6 % (0-10)    


 


Platelet Estimate


 Adequate


(ADEQUATE) 


 


 





 


Polychromasia Slight    


 


Hypochromasia Slight    


 


Prothrombin Time


 13.1 SEC


(11.7-14.0) 


 


 





 


Prothromb Time International


Ratio 1.0 (0.8-1.1) 


 


 


 





 


Activated Partial


Thromboplast Time 34 SEC (24-38) 


 


 


 





 


Lactic Acid Level


 4.1 mmol/L


(0.4-2.0) 


 


 1.0 mmol/L


(0.4-2.0)


 


Sodium Level


 


 135 mmol/L


(136-145) 


 





 


Potassium Level


 


 4.7 mmol/L


(3.5-5.1) 


 





 


Chloride Level


 


 99 mmol/L


() 


 





 


Carbon Dioxide Level


 


 27 mmol/L


(21-32) 


 





 


Anion Gap  9 (6-14)   


 


Blood Urea Nitrogen


 


 11 mg/dL


(8-26) 


 





 


Creatinine


 


 1.3 mg/dL


(0.7-1.3) 


 





 


Estimated GFR


(Cockcroft-Gault) 


 73.3 


 


 





 


BUN/Creatinine Ratio  8 (6-20)   


 


Glucose Level


 


 342 mg/dL


(70-99) 


 





 


Calcium Level


 


 7.9 mg/dL


(8.5-10.1) 


 





 


Magnesium Level


 


 1.6 mg/dL


(1.8-2.4) 


 





 


Total Bilirubin


 


 0.8 mg/dL


(0.2-1.0) 


 





 


Aspartate Amino Transf


(AST/SGOT) 


 25 U/L (15-37) 


 


 





 


Alanine Aminotransferase


(ALT/SGPT) 


 66 U/L (16-63) 


 


 





 


Alkaline Phosphatase


 


 137 U/L


() 


 





 


Creatine Kinase


 


 80 U/L


() 


 





 


Creatine Kinase MB (Mass)


 


 1.2 ng/mL


(0.0-3.6) 


 





 


Creatine Kinase MB Relative


Index 


 1.5 % (0-4) 


 


 





 


Troponin I Quantitative


 


 < 0.017 ng/mL


(0.000-0.055) 


 





 


Total Protein


 


 6.8 g/dL


(6.4-8.2) 


 





 


Albumin


 


 2.9 g/dL


(3.4-5.0) 


 





 


Albumin/Globulin Ratio  0.7 (1.0-1.7)   


 


Lipase


 


 34 U/L


() 


 





 


Urine Collection Type   Void  


 


Urine Color   Yellow  


 


Urine Clarity   Cloudy  


 


Urine pH   7.0 (<5.0-8.0)  


 


Urine Specific Gravity


 


 


 1.020


(1.000-1.030) 





 


Urine Protein


 


 


 100 mg/dL


(NEG-TRACE) 





 


Urine Glucose (UA)


 


 


 >=1000 mg/dL


(NEG) 





 


Urine Ketones (Stick)   15 mg/dL (NEG)  


 


Urine Blood   Moderate (NEG)  


 


Urine Nitrite   Negative (NEG)  


 


Urine Bilirubin   Negative (NEG)  


 


Urine Urobilinogen Dipstick


 


 


 1.0 mg/dL (0.2


mg/dL) 





 


Urine Leukocyte Esterase   Large (NEG)  


 


Urine RBC   Occ /HPF (0-2)  


 


Urine WBC


 


 


 Tntc /HPF


(0-4) 





 


Urine Squamous Epithelial


Cells 


 


 Few /LPF 


 





 


Urine Bacteria


 


 


 Many /HPF


(0-FEW) 





 


Urine Mucus   Mod /LPF  


 


Test


 4/13/21


07:12 


 


 





 


Glucose (Fingerstick)


 346 mg/dL


(70-99) 


 


 











Laboratory Tests








Test


 4/12/21


21:10 4/12/21


21:25 4/12/21


21:35 4/13/21


00:40


 


White Blood Count


 20.2 x10^3/uL


(4.0-11.0) 


 


 





 


Red Blood Count


 5.14 x10^6/uL


(4.30-5.70) 


 


 





 


Hemoglobin


 12.7 g/dL


(13.0-17.5) 


 


 





 


Hematocrit


 39.4 %


(39.0-53.0) 


 


 





 


Mean Corpuscular Volume 77 fL ()    


 


Mean Corpuscular Hemoglobin 25 pg (25-35)    


 


Mean Corpuscular Hemoglobin


Concent 32 g/dL


(31-37) 


 


 





 


Red Cell Distribution Width


 15.3 %


(11.5-14.5) 


 


 





 


Platelet Count


 334 x10^3/uL


(140-400) 


 


 





 


Neutrophils (%) (Auto) 86 % (31-73)    


 


Lymphocytes (%) (Auto) 6 % (24-48)    


 


Monocytes (%) (Auto) 8 % (0-9)    


 


Eosinophils (%) (Auto) 0 % (0-3)    


 


Basophils (%) (Auto) 1 % (0-3)    


 


Neutrophils # (Auto)


 17.4 x10^3/uL


(1.8-7.7) 


 


 





 


Lymphocytes # (Auto)


 1.1 x10^3/uL


(1.0-4.8) 


 


 





 


Monocytes # (Auto)


 1.6 x10^3/uL


(0.0-1.1) 


 


 





 


Eosinophils # (Auto)


 0.1 x10^3/uL


(0.0-0.7) 


 


 





 


Basophils # (Auto)


 0.1 x10^3/uL


(0.0-0.2) 


 


 





 


Segmented Neutrophils % 75 % (35-66)    


 


Band Neutrophils % 6 % (0-9)    


 


Lymphocytes % 13 % (24-48)    


 


Monocytes % 6 % (0-10)    


 


Platelet Estimate


 Adequate


(ADEQUATE) 


 


 





 


Polychromasia Slight    


 


Hypochromasia Slight    


 


Prothrombin Time


 13.1 SEC


(11.7-14.0) 


 


 





 


Prothromb Time International


Ratio 1.0 (0.8-1.1) 


 


 


 





 


Activated Partial


Thromboplast Time 34 SEC (24-38) 


 


 


 





 


Lactic Acid Level


 4.1 mmol/L


(0.4-2.0) 


 


 1.0 mmol/L


(0.4-2.0)


 


Sodium Level


 


 135 mmol/L


(136-145) 


 





 


Potassium Level


 


 4.7 mmol/L


(3.5-5.1) 


 





 


Chloride Level


 


 99 mmol/L


() 


 





 


Carbon Dioxide Level


 


 27 mmol/L


(21-32) 


 





 


Anion Gap  9 (6-14)   


 


Blood Urea Nitrogen


 


 11 mg/dL


(8-26) 


 





 


Creatinine


 


 1.3 mg/dL


(0.7-1.3) 


 





 


Estimated GFR


(Cockcroft-Gault) 


 73.3 


 


 





 


BUN/Creatinine Ratio  8 (6-20)   


 


Glucose Level


 


 342 mg/dL


(70-99) 


 





 


Calcium Level


 


 7.9 mg/dL


(8.5-10.1) 


 





 


Magnesium Level


 


 1.6 mg/dL


(1.8-2.4) 


 





 


Total Bilirubin


 


 0.8 mg/dL


(0.2-1.0) 


 





 


Aspartate Amino Transf


(AST/SGOT) 


 25 U/L (15-37) 


 


 





 


Alanine Aminotransferase


(ALT/SGPT) 


 66 U/L (16-63) 


 


 





 


Alkaline Phosphatase


 


 137 U/L


() 


 





 


Creatine Kinase


 


 80 U/L


() 


 





 


Creatine Kinase MB (Mass)


 


 1.2 ng/mL


(0.0-3.6) 


 





 


Creatine Kinase MB Relative


Index 


 1.5 % (0-4) 


 


 





 


Troponin I Quantitative


 


 < 0.017 ng/mL


(0.000-0.055) 


 





 


Total Protein


 


 6.8 g/dL


(6.4-8.2) 


 





 


Albumin


 


 2.9 g/dL


(3.4-5.0) 


 





 


Albumin/Globulin Ratio  0.7 (1.0-1.7)   


 


Lipase


 


 34 U/L


() 


 





 


Urine Collection Type   Void  


 


Urine Color   Yellow  


 


Urine Clarity   Cloudy  


 


Urine pH   7.0 (<5.0-8.0)  


 


Urine Specific Gravity


 


 


 1.020


(1.000-1.030) 





 


Urine Protein


 


 


 100 mg/dL


(NEG-TRACE) 





 


Urine Glucose (UA)


 


 


 >=1000 mg/dL


(NEG) 





 


Urine Ketones (Stick)   15 mg/dL (NEG)  


 


Urine Blood   Moderate (NEG)  


 


Urine Nitrite   Negative (NEG)  


 


Urine Bilirubin   Negative (NEG)  


 


Urine Urobilinogen Dipstick


 


 


 1.0 mg/dL (0.2


mg/dL) 





 


Urine Leukocyte Esterase   Large (NEG)  


 


Urine RBC   Occ /HPF (0-2)  


 


Urine WBC


 


 


 Tntc /HPF


(0-4) 





 


Urine Squamous Epithelial


Cells 


 


 Few /LPF 


 





 


Urine Bacteria


 


 


 Many /HPF


(0-FEW) 





 


Urine Mucus   Mod /LPF  


 


Test


 4/13/21


07:12 


 


 





 


Glucose (Fingerstick)


 346 mg/dL


(70-99) 


 


 














Images


Images


Exam: CT of chest, abdomen and pelvis with contrast





INDICATION: Flank pain, shortness of breath





TECHNIQUE: Sequential axial images through the chest, abdomen and pelvis obta

ined following the administration of 100 mL of Omni 350 IV contrast. Sagittal 

and coronal reformatted images were reconstructed from the axial data and 

reviewed.





Comparisons: None





FINDINGS:


Visualized portions of the thyroid are unremarkable. No enlarged mediastinal 

lymph nodes.





Heart size is normal. No pericardial effusion. Thoracic aorta has a normal 

course and caliber. Pulmonary artery is not enlarged. Airways are patent. No 

consolidation or pneumothorax. No suspicious lung nodules are identified.





No pleural effusion or thickening





Liver, spleen, pancreas, and adrenals are unremarkable. Gallstones are noted 

within the gallbladder.





Subtle right perinephric stranding. No hydronephrosis. No renal or ureteral 

calculi are identified.





Bladder is decompressed not well evaluated. Prostate is not enlarged.





Large and small bowel are unremarkable. Appendix is normal. No free intra-abd

ominal air or fluid. No obstruction.





Abdominal aorta has a normal course and caliber. Abdominal vasculature is 

patent.





No enlarged intra-abdominal lymph nodes are identified.





No suspicious osseous lesions or acute fractures.





IMPRESSION:


1.  Markedly Limited evaluation secondary to body habitus and movement at time 

of contrast bolus timing. Examination is nondiagnostic for pulmonary embolus.


2.  Essentially a noncontrast CT of the chest was performed. No abnormalities 

are identified in the chest.


3.  Gallstones and within the gallbladder. Correlate with symptomatology.


4.  Subtle right perinephric stranding. Correlate with urinalysis for infection.





VTE Prophylaxis Ordered


VTE Prophylaxis Devices:  No


VTE Pharmacological Prophylaxi:  Yes





Assessment/Plan


Assessment/Plan


Sepsis


Acute respiratory failure with hypoxia


Right pyelonephritis


DM2 with hyperglycemia


Moderate malnutrition


COVID-19 PUI





Plan:


Patient received fluids and broad-spectrum antibiotics and the ED.


Will continue treatment of asthma exacerbation and pyelonephritis with Rocephin 

daily and doxycycline IV twice daily.


Consultation to pulmonology acute hypoxia COVID-19 PUI


Treat empirically with Decadron 6 mg daily


COVID-19 and influenza pending


Basal/prandial insulin; A1c pending.


Resume home medications


FEN - Cardiac diet


PPX  - Lovenox


FULL CODE


Dispo - inpatient for above; patient names his girlfriend as his surrogate 

decision-maker.





Justifications for Admission


Other Justification














GARY MARINO MD            Apr 13, 2021 07:42

## 2021-04-14 VITALS — DIASTOLIC BLOOD PRESSURE: 79 MMHG | SYSTOLIC BLOOD PRESSURE: 138 MMHG

## 2021-04-14 VITALS — SYSTOLIC BLOOD PRESSURE: 148 MMHG | DIASTOLIC BLOOD PRESSURE: 82 MMHG

## 2021-04-14 VITALS — DIASTOLIC BLOOD PRESSURE: 71 MMHG | SYSTOLIC BLOOD PRESSURE: 140 MMHG

## 2021-04-14 VITALS — SYSTOLIC BLOOD PRESSURE: 148 MMHG | DIASTOLIC BLOOD PRESSURE: 87 MMHG

## 2021-04-14 VITALS — DIASTOLIC BLOOD PRESSURE: 66 MMHG | SYSTOLIC BLOOD PRESSURE: 181 MMHG

## 2021-04-14 VITALS — SYSTOLIC BLOOD PRESSURE: 136 MMHG | DIASTOLIC BLOOD PRESSURE: 82 MMHG

## 2021-04-14 LAB
ANION GAP SERPL CALC-SCNC: 12 MMOL/L (ref 6–14)
BASOPHILS # BLD AUTO: 0 X10^3/UL (ref 0–0.2)
BASOPHILS NFR BLD: 0 % (ref 0–3)
BUN SERPL-MCNC: 16 MG/DL (ref 8–26)
CALCIUM SERPL-MCNC: 7.4 MG/DL (ref 8.5–10.1)
CHLORIDE SERPL-SCNC: 101 MMOL/L (ref 98–107)
CO2 SERPL-SCNC: 25 MMOL/L (ref 21–32)
CREAT SERPL-MCNC: 1 MG/DL (ref 0.7–1.3)
EOSINOPHIL NFR BLD: 0 % (ref 0–3)
EOSINOPHIL NFR BLD: 0 X10^3/UL (ref 0–0.7)
ERYTHROCYTE [DISTWIDTH] IN BLOOD BY AUTOMATED COUNT: 15 % (ref 11.5–14.5)
GFR SERPLBLD BASED ON 1.73 SQ M-ARVRAT: 99.2 ML/MIN
GLUCOSE SERPL-MCNC: 173 MG/DL (ref 70–99)
HCT VFR BLD CALC: 36 % (ref 39–53)
HGB BLD-MCNC: 11.6 G/DL (ref 13–17.5)
LYMPHOCYTES # BLD: 1.1 X10^3/UL (ref 1–4.8)
LYMPHOCYTES NFR BLD AUTO: 6 % (ref 24–48)
MCH RBC QN AUTO: 25 PG (ref 25–35)
MCHC RBC AUTO-ENTMCNC: 32 G/DL (ref 31–37)
MCV RBC AUTO: 76 FL (ref 79–100)
MONO #: 2 X10^3/UL (ref 0–1.1)
MONOCYTES NFR BLD: 11 % (ref 0–9)
NEUT #: 14.5 X10^3/UL (ref 1.8–7.7)
NEUTROPHILS NFR BLD AUTO: 82 % (ref 31–73)
PLATELET # BLD AUTO: 317 X10^3/UL (ref 140–400)
POTASSIUM SERPL-SCNC: 3.9 MMOL/L (ref 3.5–5.1)
RBC # BLD AUTO: 4.72 X10^6/UL (ref 4.3–5.7)
SODIUM SERPL-SCNC: 138 MMOL/L (ref 136–145)
WBC # BLD AUTO: 17.7 X10^3/UL (ref 4–11)

## 2021-04-14 RX ADMIN — FLUTICASONE FUROATE AND VILANTEROL TRIFENATATE SCH PUFF: 100; 25 POWDER RESPIRATORY (INHALATION) at 08:51

## 2021-04-14 RX ADMIN — PIPERACILLIN SODIUM AND TAZOBACTAM SODIUM SCH MLS/HR: 3; .375 INJECTION, POWDER, LYOPHILIZED, FOR SOLUTION INTRAVENOUS at 23:37

## 2021-04-14 RX ADMIN — ATORVASTATIN CALCIUM SCH MG: 40 TABLET, FILM COATED ORAL at 20:40

## 2021-04-14 RX ADMIN — INSULIN GLARGINE SCH UNIT: 100 INJECTION, SOLUTION SUBCUTANEOUS at 21:36

## 2021-04-14 RX ADMIN — TRAZODONE HYDROCHLORIDE PRN MG: 100 TABLET ORAL at 20:44

## 2021-04-14 RX ADMIN — INSULIN LISPRO SCH UNITS: 100 INJECTION, SOLUTION INTRAVENOUS; SUBCUTANEOUS at 12:34

## 2021-04-14 RX ADMIN — INSULIN LISPRO SCH UNITS: 100 INJECTION, SOLUTION INTRAVENOUS; SUBCUTANEOUS at 17:18

## 2021-04-14 RX ADMIN — INSULIN GLARGINE SCH UNIT: 100 INJECTION, SOLUTION SUBCUTANEOUS at 08:48

## 2021-04-14 RX ADMIN — FLUTICASONE PROPIONATE SCH SPRAY: 50 SPRAY, METERED NASAL at 08:52

## 2021-04-14 RX ADMIN — MONTELUKAST SODIUM SCH MG: 10 TABLET, FILM COATED ORAL at 20:40

## 2021-04-14 RX ADMIN — LOSARTAN POTASSIUM SCH MG: 50 TABLET ORAL at 08:50

## 2021-04-14 RX ADMIN — HYDROCODONE BITARTRATE AND ACETAMINOPHEN PRN TAB: 5; 325 TABLET ORAL at 05:59

## 2021-04-14 RX ADMIN — ENOXAPARIN SODIUM SCH MG: 100 INJECTION SUBCUTANEOUS at 20:40

## 2021-04-14 RX ADMIN — INSULIN LISPRO SCH UNITS: 100 INJECTION, SOLUTION INTRAVENOUS; SUBCUTANEOUS at 08:49

## 2021-04-14 RX ADMIN — ENOXAPARIN SODIUM SCH MG: 100 INJECTION SUBCUTANEOUS at 08:50

## 2021-04-14 RX ADMIN — ASPIRIN 81 MG SCH MG: 81 TABLET ORAL at 08:51

## 2021-04-14 RX ADMIN — INSULIN LISPRO SCH UNITS: 100 INJECTION, SOLUTION INTRAVENOUS; SUBCUTANEOUS at 17:17

## 2021-04-14 RX ADMIN — PIPERACILLIN SODIUM AND TAZOBACTAM SODIUM SCH MLS/HR: 3; .375 INJECTION, POWDER, LYOPHILIZED, FOR SOLUTION INTRAVENOUS at 12:36

## 2021-04-14 RX ADMIN — HYDROCODONE BITARTRATE AND ACETAMINOPHEN PRN TAB: 5; 325 TABLET ORAL at 20:44

## 2021-04-14 RX ADMIN — PIPERACILLIN SODIUM AND TAZOBACTAM SODIUM SCH MLS/HR: 3; .375 INJECTION, POWDER, LYOPHILIZED, FOR SOLUTION INTRAVENOUS at 05:59

## 2021-04-14 RX ADMIN — PIPERACILLIN SODIUM AND TAZOBACTAM SODIUM SCH MLS/HR: 3; .375 INJECTION, POWDER, LYOPHILIZED, FOR SOLUTION INTRAVENOUS at 17:16

## 2021-04-14 RX ADMIN — DEXAMETHASONE SODIUM PHOSPHATE SCH MG: 4 INJECTION, SOLUTION INTRAMUSCULAR; INTRAVENOUS at 08:50

## 2021-04-14 NOTE — CONS
DATE OF CONSULTATION:  04/14/2021



REFERRING PHYSICIAN:  Dr. Coley.



REASON FOR CONSULTATION:  Gram-negative bacteremia.



HISTORY OF PRESENT ILLNESS:  A 40-year-old male presented to the ER on

04/13/2021 with complaints of fever, chills, nausea, right flank pain and

dysuria.  He was febrile at 103, white count of 20.2, lactate of 4.1 and

creatinine of 1.3.  Troponin was normal.  Blood cultures were done on the 12th,

which showed 1/4 bottles positive for Gram-negative clint.  UA showed large

leukocyte esterase and wbc's too numerous to count.  CT chest, abdomen and

pelvis showed limited evaluation due to body habitus.  No ____ gallstone within

the gallbladder.  Subtle right perinephric stranding.  Urine culture is pending.

 The patient is currently on Zosyn, also received ceftriaxone, doxycycline and

Levaquin.  ID consultation has been requested for antibiotic management.



Today, the patient still continues to have some nausea and right flank pain. 

Fever has improved.  Still feels has generalized weakness and continues to have

right flank pain.  Continues to have dysuria.



PAST MEDICAL HISTORY:  Hypertension, asthma, bronchitis and diabetes.



PAST SURGICAL HISTORY:  Left shoulder surgery.



FAMILY HISTORY:  As per HPI.



SOCIAL HISTORY:  No alcohol.  No drugs.  Quit smoking.  Family members still

smoke.  On disability.



CURRENT MEDICATIONS:  Zosyn.  Had been on dexamethasone, ceftriaxone,

doxycycline and Levaquin.  Other medications reviewed in medication list.



ALLERGIES:  SULFA WITH RASH AND ADHESIVE TAPE.



REVIEW OF SYSTEMS:  Negative except for above in HPI.



PHYSICAL EXAMINATION:

VITAL SIGNS:  Temperature 97.5, pulse 94, respiratory rate 19, blood pressure

138/79, oxygen saturation 100% on 3 liters by nasal cannula and T-max 103.

GENERAL:  Alert, oriented x 3, morbidly obese male, nontoxic appearing, lying in

bed comfortably, appears tired.

HEENT:  Normocephalic, atraumatic.  Anicteric.  No thrush.

NECK:  Fullness present.

LUNGS:  Clear bilaterally.

HEART:  S1, S2.

ABDOMEN:  Morbidly obese.  Right flank pain present.  No CVA tenderness, no

rebound, no guarding.  Bowel sounds present.

EXTREMITIES:  No edema, no cyanosis.

DERMATOLOGIC:  Warm and dry.  No generalized rash.

NEUROLOGIC:  Alert, oriented x 3, grossly nonfocal.

PSYCHIATRIC:  Cooperative, appropriate mood and affect.



LABORATORY DATA:  WBC 17.7 and was 20.2, hemoglobin 11.6, hematocrit 36 and

platelets 317.  COVID-19 pending.  UA, pyuria.  Sodium 138, potassium 3.9,

chloride 101, bicarbonate 25, BUN 16, creatinine 1.0 and glucose 173.  Lactate

was 4.1, repeat is 1.0.  Albumin 2.9 and lipase 34.  Troponin normal.  CK

normal.



MICROBIOLOGY:

1.  Blood culture 04/12/2021, 1/4 bottles gram-negative rods.

2.  Urine culture pending.



IMAGING:  CT chest, abdomen and pelvis as above.



IMPRESSION:

1.  Sepsis from gram-negative bacteremia.

2.  Gram-negative bacteremia.  Source is 

3.  UTI

4. Fever.

5.  Lactic acidosis. Leukocytosis.

6.   Nausea.

7..  Morbid obesity.

8.  Diabetes mellitus type 2.

9.  Generalized weakness

10.  COVID PUI



RECOMMENDATIONS:

1.  Continue Zosyn.

2.  Follow up urine and blood culture.

3.  Continue supportive care.

4.  Maintain aspiration precaution.

5.  Follow-up Covid PCR



Thank you for allowing me to participate in this patient's care.  If you have

any questions, do not hesitate to contact me.

 



______________________________

NICA MINA MD



DR:  [email or web address]/[email or web address]  JOB#:  [id number] / [id number]

DD:  04/14/2021 09:06  DT:  04/14/2021 10:30

MICHELE

## 2021-04-14 NOTE — PDOC
[facility] PROGRESS NOTE


Date of Service


DOS:


DATE: 4/14/21 


TIME: 14:22





Chief Complaint


Chief Complaint


Sepsis


Acute respiratory failure with hypoxia


Right pyelonephritis


DM2 with hyperglycemia


Moderate malnutrition


COVID-19 PUI





Plan:


Patient received fluids and broad-spectrum antibiotics and the ED.


Will continue treatment of asthma exacerbation and pyelonephritis with Rocephin 

daily and doxycycline IV twice daily.


Consultation to pulmonology acute hypoxia COVID-19 PUI


Treat empirically with Decadron 6 mg daily


COVID-19 and influenza pending


Basal/prandial insulin; A1c pending.


Resume home medications


FEN - Cardiac diet


PPX  - Lovenox


FULL CODE


Dispo - inpatient for above; patient names his girlfriend as his surrogate 

decision-maker.





History of Present Illness


History of Present Illness


Patient is a 40 old male with past medical history asthma, hypertension, DM2, 

presents to the ER with complaints of worsening shortness of breath over the 

past 2 days.  Reports associated nausea, vomiting, diarrhea, body aches, fever, 

chills, and polyuria.  He has been taking his home albuterol inhaler, symptoms 

acutely worsened when he ran out with this medication.  Upon arrival to the ED 

he was tachypnea, febrile, saturating 99% on 5 L nasal cannula.  CT 

chest/abdomen/pelvis obtained on admission showed subtle right perinephric 

stranding; gallstones, no PE or chest abnormality.  Will admit patient for 

further medical management.





4/14/2021


Afebrile, currently breathing on 2 L nasal cannula.  He denies any sensation of 

shortness of breath.  Discussed with Dr. Yun, will concern for COVID-19 based 

on CT results.  COVID-19 test still pending at this time.  Urine cultures 

positive for Proteus mirabilis; blood cultures positive for Proteus mirabilis as

well.  Continue treatment for pyelonephritis with Zosyn, per ID.  Will follow 

sensitivities.  If COVID-19 positive, will initiate remdesivir.





Vitals/I&O


Vitals/I&O:





                                   Vital Signs








  Date Time  Temp Pulse Resp B/P (MAP) Pulse Ox O2 Delivery O2 Flow Rate FiO2


 


4/14/21 11:00 97.6 95 20 181/66 (104) 97 Nasal Cannula 3.0 





 97.6       














                                    I & O   


 


 4/13/21 4/13/21 4/14/21





 15:00 23:00 07:00


 


Intake Total 240 ml 300 ml 540 ml


 


Output Total 1500 ml 1200 ml 400 ml


 


Balance -1260 ml -900 ml 140 ml











Physical Exam


Physical Exam:


General: Morbidly obese.  Alert, Oriented X3, Cooperative, mild distress


HEENT:  PERRLA, EOMI


Lungs: Decreased breath sounds bilaterally, Normal air movement


Heart:  RRR, no murmurs


Cardiovascular:  S1, S2


Abdomen:  Normal bowel sounds, Soft, bilateral flank tenderness, left greater 

than right.


Extremities:  No clubbing, No cyanosis


Skin:  No rashes, No significant lesion


Neuro:  Normal speech, Normal tone, Sensation intact


Psych/Mental Status:  Mental status NL, Mood N


General:  Alert


Heart:  Regular rate


Lungs:  Other





Labs


Labs:





Laboratory Tests








Test


 4/13/21


16:52 4/13/21


21:21 4/14/21


03:38 4/14/21


11:30


 


Glucose (Fingerstick)


 225 mg/dL


(70-99) 204 mg/dL


(70-99) 


 194 mg/dL


(70-99)


 


White Blood Count


 


 


 17.7 x10^3/uL


(4.0-11.0) 





 


Red Blood Count


 


 


 4.72 x10^6/uL


(4.30-5.70) 





 


Hemoglobin


 


 


 11.6 g/dL


(13.0-17.5) 





 


Hematocrit


 


 


 36.0 %


(39.0-53.0) 





 


Mean Corpuscular Volume   76 fL ()  


 


Mean Corpuscular Hemoglobin   25 pg (25-35)  


 


Mean Corpuscular Hemoglobin


Concent 


 


 32 g/dL


(31-37) 





 


Red Cell Distribution Width


 


 


 15.0 %


(11.5-14.5) 





 


Platelet Count


 


 


 317 x10^3/uL


(140-400) 





 


Neutrophils (%) (Auto)   82 % (31-73)  


 


Lymphocytes (%) (Auto)   6 % (24-48)  


 


Monocytes (%) (Auto)   11 % (0-9)  


 


Eosinophils (%) (Auto)   0 % (0-3)  


 


Basophils (%) (Auto)   0 % (0-3)  


 


Neutrophils # (Auto)


 


 


 14.5 x10^3/uL


(1.8-7.7) 





 


Lymphocytes # (Auto)


 


 


 1.1 x10^3/uL


(1.0-4.8) 





 


Monocytes # (Auto)


 


 


 2.0 x10^3/uL


(0.0-1.1) 





 


Eosinophils # (Auto)


 


 


 0.0 x10^3/uL


(0.0-0.7) 





 


Basophils # (Auto)


 


 


 0.0 x10^3/uL


(0.0-0.2) 





 


Sodium Level


 


 


 138 mmol/L


(136-145) 





 


Potassium Level


 


 


 3.9 mmol/L


(3.5-5.1) 





 


Chloride Level


 


 


 101 mmol/L


() 





 


Carbon Dioxide Level


 


 


 25 mmol/L


(21-32) 





 


Anion Gap   12 (6-14)  


 


Blood Urea Nitrogen


 


 


 16 mg/dL


(8-26) 





 


Creatinine


 


 


 1.0 mg/dL


(0.7-1.3) 





 


Estimated GFR


(Cockcroft-Gault) 


 


 99.2 


 





 


Glucose Level


 


 


 173 mg/dL


(70-99) 





 


Calcium Level


 


 


 7.4 mg/dL


(8.5-10.1) 














Assessment and Plan


Assessmemt and Plan


Problems


Medical Problems:


(1) Hypomagnesemia


Status: Acute  





(2) Pyelonephritis


Status: Acute  





(3) Septic shock


Status: Acute  





(4) Suspected 2019 novel coronavirus infection


Status: Acute  








Goals of Care:


Advance Care Planning: Total time spent face-to-face with patient greater than 

16 minutes in discussion with goals of care, comfort care, end-of-life care, 

pain management, code status





Comment


Review of Relevant


I have reviewed the following items martha (where applicable) has been applied.


Medications:





Current Medications








 Medications


  (Trade)  Dose


 Ordered  Sig/Pratik


 Route


 PRN Reason  Start Time


 Stop Time Status Last Admin


Dose Admin


 


 Atorvastatin


 Calcium


  (Lipitor)  40 mg  QHS


 PO


   4/13/21 21:00


    4/13/21 21:17





 


 Montelukast Sodium


  (Singulair)  10 mg  QHS


 PO


   4/13/21 21:00


    4/13/21 21:16














Justifications for Admission


Other Justification


Acute respiratory failure with hypoxia, pyelonephritis, COVID-19 GARY ENRIQUEZ MD            Apr 14, 2021 14:26

## 2021-04-14 NOTE — NUR
GISEL following for discharge planning today. GISEL spoke with RN and reviewed chart. Pt from 
home. Pt now requiring 3l 02. Consult to pulmonary. Pt COVID pending. Pt remains on IV 
Zosyn, positive blood cultures. Consult to ID. SW following.

## 2021-04-14 NOTE — PDOC
PULMONARY PROGRESS NOTES


DATE: 4/14/21 


TIME: 12:20


Subjective


c/o abdominal pain


no soa


Vitals





Vital Signs








  Date Time  Temp Pulse Resp B/P (MAP) Pulse Ox O2 Delivery O2 Flow Rate FiO2


 


4/14/21 11:00 97.6 95 20 181/66 (104) 97 Nasal Cannula 3.0 





 97.6       








General:  Alert, No acute distress


Lungs:  Other


Cardiovascular:  S1


Abdomen:  Soft, Other (obese)


Extremities:  No Edema


Labs





Laboratory Tests








Test


 4/12/21


21:10 4/12/21


21:25 4/12/21


21:35 4/13/21


00:40


 


White Blood Count


 20.2 x10^3/uL


(4.0-11.0) 


 


 





 


Red Blood Count


 5.14 x10^6/uL


(4.30-5.70) 


 


 





 


Hemoglobin


 12.7 g/dL


(13.0-17.5) 


 


 





 


Hematocrit


 39.4 %


(39.0-53.0) 


 


 





 


Mean Corpuscular Volume 77 fL ()    


 


Mean Corpuscular Hemoglobin 25 pg (25-35)    


 


Mean Corpuscular Hemoglobin


Concent 32 g/dL


(31-37) 


 


 





 


Red Cell Distribution Width


 15.3 %


(11.5-14.5) 


 


 





 


Platelet Count


 334 x10^3/uL


(140-400) 


 


 





 


Neutrophils (%) (Auto) 86 % (31-73)    


 


Lymphocytes (%) (Auto) 6 % (24-48)    


 


Monocytes (%) (Auto) 8 % (0-9)    


 


Eosinophils (%) (Auto) 0 % (0-3)    


 


Basophils (%) (Auto) 1 % (0-3)    


 


Neutrophils # (Auto)


 17.4 x10^3/uL


(1.8-7.7) 


 


 





 


Lymphocytes # (Auto)


 1.1 x10^3/uL


(1.0-4.8) 


 


 





 


Monocytes # (Auto)


 1.6 x10^3/uL


(0.0-1.1) 


 


 





 


Eosinophils # (Auto)


 0.1 x10^3/uL


(0.0-0.7) 


 


 





 


Basophils # (Auto)


 0.1 x10^3/uL


(0.0-0.2) 


 


 





 


Segmented Neutrophils % 75 % (35-66)    


 


Band Neutrophils % 6 % (0-9)    


 


Lymphocytes % 13 % (24-48)    


 


Monocytes % 6 % (0-10)    


 


Platelet Estimate


 Adequate


(ADEQUATE) 


 


 





 


Polychromasia Slight    


 


Hypochromasia Slight    


 


Prothrombin Time


 13.1 SEC


(11.7-14.0) 


 


 





 


Prothromb Time International


Ratio 1.0 (0.8-1.1) 


 


 


 





 


Activated Partial


Thromboplast Time 34 SEC (24-38) 


 


 


 





 


Lactic Acid Level


 4.1 mmol/L


(0.4-2.0) 


 


 1.0 mmol/L


(0.4-2.0)


 


Sodium Level


 


 135 mmol/L


(136-145) 


 





 


Potassium Level


 


 4.7 mmol/L


(3.5-5.1) 


 





 


Chloride Level


 


 99 mmol/L


() 


 





 


Carbon Dioxide Level


 


 27 mmol/L


(21-32) 


 





 


Anion Gap  9 (6-14)   


 


Blood Urea Nitrogen


 


 11 mg/dL


(8-26) 


 





 


Creatinine


 


 1.3 mg/dL


(0.7-1.3) 


 





 


Estimated GFR


(Cockcroft-Gault) 


 73.3 


 


 





 


BUN/Creatinine Ratio  8 (6-20)   


 


Glucose Level


 


 342 mg/dL


(70-99) 


 





 


Calcium Level


 


 7.9 mg/dL


(8.5-10.1) 


 





 


Magnesium Level


 


 1.6 mg/dL


(1.8-2.4) 


 





 


Total Bilirubin


 


 0.8 mg/dL


(0.2-1.0) 


 





 


Aspartate Amino Transf


(AST/SGOT) 


 25 U/L (15-37) 


 


 





 


Alanine Aminotransferase


(ALT/SGPT) 


 66 U/L (16-63) 


 


 





 


Alkaline Phosphatase


 


 137 U/L


() 


 





 


Creatine Kinase


 


 80 U/L


() 


 





 


Creatine Kinase MB (Mass)


 


 1.2 ng/mL


(0.0-3.6) 


 





 


Creatine Kinase MB Relative


Index 


 1.5 % (0-4) 


 


 





 


Troponin I Quantitative


 


 < 0.017 ng/mL


(0.000-0.055) 


 





 


Total Protein


 


 6.8 g/dL


(6.4-8.2) 


 





 


Albumin


 


 2.9 g/dL


(3.4-5.0) 


 





 


Albumin/Globulin Ratio  0.7 (1.0-1.7)   


 


Lipase


 


 34 U/L


() 


 





 


Urine Collection Type   Void  


 


Urine Color   Yellow  


 


Urine Clarity   Cloudy  


 


Urine pH   7.0 (<5.0-8.0)  


 


Urine Specific Gravity


 


 


 1.020


(1.000-1.030) 





 


Urine Protein


 


 


 100 mg/dL


(NEG-TRACE) 





 


Urine Glucose (UA)


 


 


 >=1000 mg/dL


(NEG) 





 


Urine Ketones (Stick)   15 mg/dL (NEG)  


 


Urine Blood   Moderate (NEG)  


 


Urine Nitrite   Negative (NEG)  


 


Urine Bilirubin   Negative (NEG)  


 


Urine Urobilinogen Dipstick


 


 


 1.0 mg/dL (0.2


mg/dL) 





 


Urine Leukocyte Esterase   Large (NEG)  


 


Urine RBC   Occ /HPF (0-2)  


 


Urine WBC


 


 


 Tntc /HPF


(0-4) 





 


Urine Squamous Epithelial


Cells 


 


 Few /LPF 


 





 


Urine Bacteria


 


 


 Many /HPF


(0-FEW) 





 


Urine Mucus   Mod /LPF  


 


Test


 4/13/21


07:12 4/13/21


08:55 4/13/21


11:57 4/13/21


16:52


 


Glucose (Fingerstick)


 346 mg/dL


(70-99) 


 331 mg/dL


(70-99) 225 mg/dL


(70-99)


 


Influenza Type A Antigen


 


 Negative


(NEGATIVE) 


 





 


Influenza Type B Antigen


 


 Negative


(NEGATIVE) 


 





 


Test


 4/13/21


21:21 4/14/21


03:38 4/14/21


11:30 





 


Glucose (Fingerstick)


 204 mg/dL


(70-99) 


 194 mg/dL


(70-99) 





 


White Blood Count


 


 17.7 x10^3/uL


(4.0-11.0) 


 





 


Red Blood Count


 


 4.72 x10^6/uL


(4.30-5.70) 


 





 


Hemoglobin


 


 11.6 g/dL


(13.0-17.5) 


 





 


Hematocrit


 


 36.0 %


(39.0-53.0) 


 





 


Mean Corpuscular Volume  76 fL ()   


 


Mean Corpuscular Hemoglobin  25 pg (25-35)   


 


Mean Corpuscular Hemoglobin


Concent 


 32 g/dL


(31-37) 


 





 


Red Cell Distribution Width


 


 15.0 %


(11.5-14.5) 


 





 


Platelet Count


 


 317 x10^3/uL


(140-400) 


 





 


Neutrophils (%) (Auto)  82 % (31-73)   


 


Lymphocytes (%) (Auto)  6 % (24-48)   


 


Monocytes (%) (Auto)  11 % (0-9)   


 


Eosinophils (%) (Auto)  0 % (0-3)   


 


Basophils (%) (Auto)  0 % (0-3)   


 


Neutrophils # (Auto)


 


 14.5 x10^3/uL


(1.8-7.7) 


 





 


Lymphocytes # (Auto)


 


 1.1 x10^3/uL


(1.0-4.8) 


 





 


Monocytes # (Auto)


 


 2.0 x10^3/uL


(0.0-1.1) 


 





 


Eosinophils # (Auto)


 


 0.0 x10^3/uL


(0.0-0.7) 


 





 


Basophils # (Auto)


 


 0.0 x10^3/uL


(0.0-0.2) 


 





 


Sodium Level


 


 138 mmol/L


(136-145) 


 





 


Potassium Level


 


 3.9 mmol/L


(3.5-5.1) 


 





 


Chloride Level


 


 101 mmol/L


() 


 





 


Carbon Dioxide Level


 


 25 mmol/L


(21-32) 


 





 


Anion Gap  12 (6-14)   


 


Blood Urea Nitrogen


 


 16 mg/dL


(8-26) 


 





 


Creatinine


 


 1.0 mg/dL


(0.7-1.3) 


 





 


Estimated GFR


(Cockcroft-Gault) 


 99.2 


 


 





 


Glucose Level


 


 173 mg/dL


(70-99) 


 





 


Calcium Level


 


 7.4 mg/dL


(8.5-10.1) 


 











Laboratory Tests








Test


 4/13/21


16:52 4/13/21


21:21 4/14/21


03:38 4/14/21


11:30


 


Glucose (Fingerstick)


 225 mg/dL


(70-99) 204 mg/dL


(70-99) 


 194 mg/dL


(70-99)


 


White Blood Count


 


 


 17.7 x10^3/uL


(4.0-11.0) 





 


Red Blood Count


 


 


 4.72 x10^6/uL


(4.30-5.70) 





 


Hemoglobin


 


 


 11.6 g/dL


(13.0-17.5) 





 


Hematocrit


 


 


 36.0 %


(39.0-53.0) 





 


Mean Corpuscular Volume   76 fL ()  


 


Mean Corpuscular Hemoglobin   25 pg (25-35)  


 


Mean Corpuscular Hemoglobin


Concent 


 


 32 g/dL


(31-37) 





 


Red Cell Distribution Width


 


 


 15.0 %


(11.5-14.5) 





 


Platelet Count


 


 


 317 x10^3/uL


(140-400) 





 


Neutrophils (%) (Auto)   82 % (31-73)  


 


Lymphocytes (%) (Auto)   6 % (24-48)  


 


Monocytes (%) (Auto)   11 % (0-9)  


 


Eosinophils (%) (Auto)   0 % (0-3)  


 


Basophils (%) (Auto)   0 % (0-3)  


 


Neutrophils # (Auto)


 


 


 14.5 x10^3/uL


(1.8-7.7) 





 


Lymphocytes # (Auto)


 


 


 1.1 x10^3/uL


(1.0-4.8) 





 


Monocytes # (Auto)


 


 


 2.0 x10^3/uL


(0.0-1.1) 





 


Eosinophils # (Auto)


 


 


 0.0 x10^3/uL


(0.0-0.7) 





 


Basophils # (Auto)


 


 


 0.0 x10^3/uL


(0.0-0.2) 





 


Sodium Level


 


 


 138 mmol/L


(136-145) 





 


Potassium Level


 


 


 3.9 mmol/L


(3.5-5.1) 





 


Chloride Level


 


 


 101 mmol/L


() 





 


Carbon Dioxide Level


 


 


 25 mmol/L


(21-32) 





 


Anion Gap   12 (6-14)  


 


Blood Urea Nitrogen


 


 


 16 mg/dL


(8-26) 





 


Creatinine


 


 


 1.0 mg/dL


(0.7-1.3) 





 


Estimated GFR


(Cockcroft-Gault) 


 


 99.2 


 





 


Glucose Level


 


 


 173 mg/dL


(70-99) 





 


Calcium Level


 


 


 7.4 mg/dL


(8.5-10.1) 











Medications





Active Scripts








 Medications  Dose


 Route/Sig


 Max Daily Dose Days Date Category


 


 Novolog (Insulin


 Aspart) 100


 Unit/1 Ml


 Cartridge  30 Unit


 SQ TIDWMEALS


   4/13/21 Reported


 


 Norco 5-325


 Tablet


  (Acetaminophen/Hydrocodone


 Bitart) 1 Each


 Tablet  1 Tab


 PO PRN Q6HRS PRN


  5 1/5/21 Rx


 


 Pantoprazole


 Sodium  **


  (Pantoprazole


 Sodium) 40 Mg


 Tablet.dr  40 Mg


 PO DAILYAC


  30 1/5/21 Rx


 


 Montelukast


 Sodium Tablet  **


  (Montelukast


 Sodium) 10 Mg


 Tablet  10 Mg


 PO QHS


  30 1/5/21 Rx


 


 Cozaar **


  (Losartan


 Potassium) 50 Mg


 Tablet  50 Mg


 PO DAILY


  30 1/5/21 Rx


 


 Combivent


 Respimat Inhal


  (Ipratropium/Albuterol


 Sulfate) 4 Gm


 Aer.w.adap  2 Inh


 IH QID


  30 1/5/21 Rx


 


 Prednisone 20 Mg


 Tablet  1 Tab


 PO DAILY


  5 1/5/21 Rx


 


 Doxycycline


 Hyclate 100 Mg


 Tablet  100 Mg


 PO BID


  7 1/5/21 Rx


 


 Sertraline Hcl


 100 Mg Tablet  100 Mg


 PO DAILY


  30 1/5/21 Rx


 


 Trazodone Hcl 100


 Mg Tablet  100 Mg


 PO PRN QHS PRN


  30 1/5/21 Rx


 


 Lantus Solostar


  (Insulin


 Glargine,Hum.rec.anlog)


 100 Unit/1 Ml


 Insuln.pen  30 Unit


 SQ BID


  30 1/5/21 Rx


 


 Atorvastatin


 Calcium 40 Mg


 Tablet  40 Mg


 PO QHS


   3/20/19 Reported


 


 Fluticasone


 Propionate Nasal


 Spray


  (Fluticasone


 Propionate) 16 Gm


 Spray.susp  2 Spr


 ASIM DAILY


   3/20/19 Reported


 


 Aspirin 81 Mg


 Tab.chew  1 Tab


 PO DAILY


   1/25/16 Reported











Impression


.


IMPRESSION:


1.  Acute hypoxic respiratory failure with high-grade fever.  No definite


infiltrates seen on the CT chest.  The possibility of acute pyelonephritis vs 

cholecystitis would


be a consideration.


2.  No significant tobacco history.


3.  Morbid obesity with underlying sleep apnea.  He is awaiting CPAP,  details


of his sleep study not available.


4.  Influenza screen negative.  COVID-19 pending.


5.  Leukocytosis.





Plan


.





1.  Continue present oxygen to keep saturation 92 and above.


2.  Follow final BC/ Urine has Proteus


3.  Rule out COVID-19.


4.  Continue broad-spectrum antibiotics.


5.  Lovenox for DVT prophylaxis.


6.  The patient was initiated on dexamethasone.  If COVID tests come back


negative, then we will discontinue steroids. 


 Discussed with PCP











MOLLY DIANA MD                 Apr 14, 2021 12:23

## 2021-04-15 VITALS — SYSTOLIC BLOOD PRESSURE: 137 MMHG | DIASTOLIC BLOOD PRESSURE: 76 MMHG

## 2021-04-15 VITALS — DIASTOLIC BLOOD PRESSURE: 79 MMHG | SYSTOLIC BLOOD PRESSURE: 125 MMHG

## 2021-04-15 VITALS — DIASTOLIC BLOOD PRESSURE: 81 MMHG | SYSTOLIC BLOOD PRESSURE: 132 MMHG

## 2021-04-15 VITALS — DIASTOLIC BLOOD PRESSURE: 79 MMHG | SYSTOLIC BLOOD PRESSURE: 152 MMHG

## 2021-04-15 VITALS — DIASTOLIC BLOOD PRESSURE: 77 MMHG | SYSTOLIC BLOOD PRESSURE: 127 MMHG

## 2021-04-15 VITALS — DIASTOLIC BLOOD PRESSURE: 92 MMHG | SYSTOLIC BLOOD PRESSURE: 159 MMHG

## 2021-04-15 LAB
ANION GAP SERPL CALC-SCNC: 7 MMOL/L (ref 6–14)
BASOPHILS # BLD AUTO: 0 X10^3/UL (ref 0–0.2)
BASOPHILS NFR BLD: 0 % (ref 0–3)
BUN SERPL-MCNC: 15 MG/DL (ref 8–26)
CALCIUM SERPL-MCNC: 7.4 MG/DL (ref 8.5–10.1)
CHLORIDE SERPL-SCNC: 102 MMOL/L (ref 98–107)
CO2 SERPL-SCNC: 27 MMOL/L (ref 21–32)
CREAT SERPL-MCNC: 1 MG/DL (ref 0.7–1.3)
EOSINOPHIL NFR BLD: 0 % (ref 0–3)
EOSINOPHIL NFR BLD: 0 X10^3/UL (ref 0–0.7)
ERYTHROCYTE [DISTWIDTH] IN BLOOD BY AUTOMATED COUNT: 15.4 % (ref 11.5–14.5)
GFR SERPLBLD BASED ON 1.73 SQ M-ARVRAT: 99.2 ML/MIN
GLUCOSE SERPL-MCNC: 166 MG/DL (ref 70–99)
HCT VFR BLD CALC: 34.5 % (ref 39–53)
HGB BLD-MCNC: 11 G/DL (ref 13–17.5)
LYMPHOCYTES # BLD: 1.3 X10^3/UL (ref 1–4.8)
LYMPHOCYTES NFR BLD AUTO: 9 % (ref 24–48)
MCH RBC QN AUTO: 24 PG (ref 25–35)
MCHC RBC AUTO-ENTMCNC: 32 G/DL (ref 31–37)
MCV RBC AUTO: 76 FL (ref 79–100)
MONO #: 2.1 X10^3/UL (ref 0–1.1)
MONOCYTES NFR BLD: 14 % (ref 0–9)
NEUT #: 10.9 X10^3/UL (ref 1.8–7.7)
NEUTROPHILS NFR BLD AUTO: 76 % (ref 31–73)
PLATELET # BLD AUTO: 315 X10^3/UL (ref 140–400)
POTASSIUM SERPL-SCNC: 4 MMOL/L (ref 3.5–5.1)
RBC # BLD AUTO: 4.53 X10^6/UL (ref 4.3–5.7)
SODIUM SERPL-SCNC: 136 MMOL/L (ref 136–145)
WBC # BLD AUTO: 14.3 X10^3/UL (ref 4–11)

## 2021-04-15 RX ADMIN — INSULIN LISPRO SCH UNITS: 100 INJECTION, SOLUTION INTRAVENOUS; SUBCUTANEOUS at 09:03

## 2021-04-15 RX ADMIN — ATORVASTATIN CALCIUM SCH MG: 40 TABLET, FILM COATED ORAL at 21:29

## 2021-04-15 RX ADMIN — PIPERACILLIN SODIUM AND TAZOBACTAM SODIUM SCH MLS/HR: 3; .375 INJECTION, POWDER, LYOPHILIZED, FOR SOLUTION INTRAVENOUS at 06:05

## 2021-04-15 RX ADMIN — MONTELUKAST SODIUM SCH MG: 10 TABLET, FILM COATED ORAL at 21:29

## 2021-04-15 RX ADMIN — INSULIN GLARGINE SCH UNIT: 100 INJECTION, SOLUTION SUBCUTANEOUS at 21:32

## 2021-04-15 RX ADMIN — PIPERACILLIN SODIUM AND TAZOBACTAM SODIUM SCH MLS/HR: 3; .375 INJECTION, POWDER, LYOPHILIZED, FOR SOLUTION INTRAVENOUS at 17:02

## 2021-04-15 RX ADMIN — LOSARTAN POTASSIUM SCH MG: 50 TABLET ORAL at 09:05

## 2021-04-15 RX ADMIN — INSULIN GLARGINE SCH UNIT: 100 INJECTION, SOLUTION SUBCUTANEOUS at 09:04

## 2021-04-15 RX ADMIN — INSULIN LISPRO SCH UNITS: 100 INJECTION, SOLUTION INTRAVENOUS; SUBCUTANEOUS at 17:06

## 2021-04-15 RX ADMIN — PIPERACILLIN SODIUM AND TAZOBACTAM SODIUM SCH MLS/HR: 3; .375 INJECTION, POWDER, LYOPHILIZED, FOR SOLUTION INTRAVENOUS at 23:18

## 2021-04-15 RX ADMIN — FLUTICASONE PROPIONATE SCH SPRAY: 50 SPRAY, METERED NASAL at 09:05

## 2021-04-15 RX ADMIN — ENOXAPARIN SODIUM SCH MG: 100 INJECTION SUBCUTANEOUS at 21:30

## 2021-04-15 RX ADMIN — ENOXAPARIN SODIUM SCH MG: 100 INJECTION SUBCUTANEOUS at 09:04

## 2021-04-15 RX ADMIN — HYDROCODONE BITARTRATE AND ACETAMINOPHEN PRN TAB: 5; 325 TABLET ORAL at 01:48

## 2021-04-15 RX ADMIN — INSULIN LISPRO SCH UNITS: 100 INJECTION, SOLUTION INTRAVENOUS; SUBCUTANEOUS at 12:00

## 2021-04-15 RX ADMIN — DEXAMETHASONE SODIUM PHOSPHATE SCH MG: 4 INJECTION, SOLUTION INTRAMUSCULAR; INTRAVENOUS at 09:06

## 2021-04-15 RX ADMIN — ASPIRIN 81 MG SCH MG: 81 TABLET ORAL at 09:04

## 2021-04-15 RX ADMIN — INSULIN LISPRO SCH UNITS: 100 INJECTION, SOLUTION INTRAVENOUS; SUBCUTANEOUS at 17:00

## 2021-04-15 RX ADMIN — FLUTICASONE FUROATE AND VILANTEROL TRIFENATATE SCH PUFF: 100; 25 POWDER RESPIRATORY (INHALATION) at 09:06

## 2021-04-15 RX ADMIN — HYDROCODONE BITARTRATE AND ACETAMINOPHEN PRN TAB: 5; 325 TABLET ORAL at 19:44

## 2021-04-15 RX ADMIN — PIPERACILLIN SODIUM AND TAZOBACTAM SODIUM SCH MLS/HR: 3; .375 INJECTION, POWDER, LYOPHILIZED, FOR SOLUTION INTRAVENOUS at 12:21

## 2021-04-15 RX ADMIN — TRAZODONE HYDROCHLORIDE PRN MG: 100 TABLET ORAL at 21:29

## 2021-04-15 NOTE — NUR
assumed care from Giselle. he is complaining of ruq discomfort. abdominal sonogram ordered. 
Mark is informed to be NPO until after sonogram completed--verbalized understanding

## 2021-04-15 NOTE — PDOC
Infectious Disease Note


Subjective:


Subjective


Patient feels okay


Still has some nausea, right flank pain, dysuria


Shortness of breath is improved


Still feels weak


Tolerating p.o. intake well





Vital Signs:


Vital Signs





Vital Signs








  Date Time  Temp Pulse Resp B/P (MAP) Pulse Ox O2 Delivery O2 Flow Rate FiO2


 


4/15/21 03:00 98.8 84 18 125/79 (94) 98   





 98.8       


 


4/14/21 20:10      Nasal Cannula 3.0 











Physical Exam:


PHYSICAL EXAM


GENERAL:  Alert, oriented x 3, morbidly obese male, nontoxic appearing, lying in


bed comfortably, appears tired.


HEENT:  Normocephalic, atraumatic.  Anicteric.  No thrush.


NECK:  Fullness present.


LUNGS:  Clear bilaterally.


HEART:  S1, S2.


ABDOMEN:  Morbidly obese.  Right flank pain present.  No CVA tenderness, no


rebound, no guarding.  Bowel sounds present.


EXTREMITIES:  No edema, no cyanosis.


DERMATOLOGIC:  Warm and dry.  No generalized rash.


NEUROLOGIC:  Alert, oriented x 3, grossly nonfocal.


PSYCHIATRIC:  Cooperative, appropriate mood and affect.





Medications:


Inpatient Meds:


Medications reviewed.





Labs:


Lab





Laboratory Tests








Test


 4/14/21


11:30 4/14/21


16:40 4/14/21


21:17 4/15/21


04:02


 


Glucose (Fingerstick)


 194 mg/dL


(70-99) 181 mg/dL


(70-99) 238 mg/dL


(70-99) 





 


White Blood Count


 


 


 


 14.3 x10^3/uL


(4.0-11.0)


 


Red Blood Count


 


 


 


 4.53 x10^6/uL


(4.30-5.70)


 


Hemoglobin


 


 


 


 11.0 g/dL


(13.0-17.5)


 


Hematocrit


 


 


 


 34.5 %


(39.0-53.0)


 


Mean Corpuscular Volume    76 fL () 


 


Mean Corpuscular Hemoglobin    24 pg (25-35) 


 


Mean Corpuscular Hemoglobin


Concent 


 


 


 32 g/dL


(31-37)


 


Red Cell Distribution Width


 


 


 


 15.4 %


(11.5-14.5)


 


Platelet Count


 


 


 


 315 x10^3/uL


(140-400)


 


Neutrophils (%) (Auto)    76 % (31-73) 


 


Lymphocytes (%) (Auto)    9 % (24-48) 


 


Monocytes (%) (Auto)    14 % (0-9) 


 


Eosinophils (%) (Auto)    0 % (0-3) 


 


Basophils (%) (Auto)    0 % (0-3) 


 


Neutrophils # (Auto)


 


 


 


 10.9 x10^3/uL


(1.8-7.7)


 


Lymphocytes # (Auto)


 


 


 


 1.3 x10^3/uL


(1.0-4.8)


 


Monocytes # (Auto)


 


 


 


 2.1 x10^3/uL


(0.0-1.1)


 


Eosinophils # (Auto)


 


 


 


 0.0 x10^3/uL


(0.0-0.7)


 


Basophils # (Auto)


 


 


 


 0.0 x10^3/uL


(0.0-0.2)


 


Sodium Level


 


 


 


 136 mmol/L


(136-145)


 


Potassium Level


 


 


 


 4.0 mmol/L


(3.5-5.1)


 


Chloride Level


 


 


 


 102 mmol/L


()


 


Carbon Dioxide Level


 


 


 


 27 mmol/L


(21-32)


 


Anion Gap    7 (6-14) 


 


Blood Urea Nitrogen


 


 


 


 15 mg/dL


(8-26)


 


Creatinine


 


 


 


 1.0 mg/dL


(0.7-1.3)


 


Estimated GFR


(Cockcroft-Gault) 


 


 


 99.2 





 


Glucose Level


 


 


 


 166 mg/dL


(70-99)


 


Calcium Level


 


 


 


 7.4 mg/dL


(8.5-10.1)


 


Test


 4/15/21


08:02 


 


 





 


Glucose (Fingerstick)


 155 mg/dL


(70-99) 


 


 














Objective:


Assessment:





1.  Sepsis from gram-negative bacteremia.


2.  Proteus Mirabella's bacteremia.


3.  Fever.


4.  Leukocytosis.


5.  Lactic acidosis.


6.  Proteus mirabilis urinary tract infection.


7.  Nausea.


8.  Morbid obesity.


9.  Diabetes mellitus type 2.


10.  Covid PCR negative





Plan:


Plan of Care





1.  Continue Zosyn.


2.  Follow up urine and blood culture.


3.  Continue supportive care.


4.  Maintain aspiration precaution.











NICA MINA MD           Apr 15, 2021 08:21

## 2021-04-15 NOTE — RAD
EXAMINATION: RIGHT UPPER QUADRANT ULTRASOUND  



CLINICAL HISTORY: Right upper quadrant pain concerning for cholecystitis



TECHNIQUE: Sonography of the right upper quadrant was performed.  



COMPARISON: CT chest/abdomen/pelvis 4/12/2021





FINDINGS: 



Significantly limited evaluation secondary to patient body habitus, limited mobility, and prominent b
owel gas.



Pancreas: Obscured by prominent overlying bowel gas.

   

Liver: Mild to moderately enlarged.

- Echotexture: Homogeneous

- Echogenicity: Increased, suggestive of steatosis

- Surface contour: Smooth

- Lesions: None.

     

Biliary: No intrahepatic biliary duct dilation. 

     - CBD: 4 mm.

     - Gallbladder: Normal caliber.

          - Contents: Cholelithiasis.

          - Wall: No abnormal thickening.

          - Other: No pericholecystic fluid.



Right Kidney: Not visualized. 



Ascites: None.



Aorta/IVC: Poorly visualized.





IMPRESSION:  



Significantly limited evaluation as described.



Cholelithiasis without evidence of acute cholecystitis.



Hepatomegaly with findings suggestive of steatosis.



Electronically signed by: Judah Payne DO (4/15/2021 5:50 PM) [id number]

## 2021-04-15 NOTE — PDOC
PULMONARY PROGRESS NOTES


DATE: 4/15/21 


TIME: 09:09


Subjective


Not short of air.  No chest pain no pressure


Vitals





Vital Signs








  Date Time  Temp Pulse Resp B/P (MAP) Pulse Ox O2 Delivery O2 Flow Rate FiO2


 


4/15/21 09:05  89  127/89    


 


4/15/21 07:00   18  100 Nasal Cannula 3.0 


 


4/15/21 03:00 98.8       





 98.8       








General:  Alert, No acute distress


Lungs:  Clear


Cardiovascular:  S1


Abdomen:  Soft, Other (obese)


Neuro Exam:  Alert


Extremities:  No Edema, Other (Some edema)


Labs





Laboratory Tests








Test


 4/13/21


11:57 4/13/21


16:52 4/13/21


21:21 4/14/21


03:38


 


Glucose (Fingerstick)


 331 mg/dL


(70-99) 225 mg/dL


(70-99) 204 mg/dL


(70-99) 





 


White Blood Count


 


 


 


 17.7 x10^3/uL


(4.0-11.0)


 


Red Blood Count


 


 


 


 4.72 x10^6/uL


(4.30-5.70)


 


Hemoglobin


 


 


 


 11.6 g/dL


(13.0-17.5)


 


Hematocrit


 


 


 


 36.0 %


(39.0-53.0)


 


Mean Corpuscular Volume    76 fL () 


 


Mean Corpuscular Hemoglobin    25 pg (25-35) 


 


Mean Corpuscular Hemoglobin


Concent 


 


 


 32 g/dL


(31-37)


 


Red Cell Distribution Width


 


 


 


 15.0 %


(11.5-14.5)


 


Platelet Count


 


 


 


 317 x10^3/uL


(140-400)


 


Neutrophils (%) (Auto)    82 % (31-73) 


 


Lymphocytes (%) (Auto)    6 % (24-48) 


 


Monocytes (%) (Auto)    11 % (0-9) 


 


Eosinophils (%) (Auto)    0 % (0-3) 


 


Basophils (%) (Auto)    0 % (0-3) 


 


Neutrophils # (Auto)


 


 


 


 14.5 x10^3/uL


(1.8-7.7)


 


Lymphocytes # (Auto)


 


 


 


 1.1 x10^3/uL


(1.0-4.8)


 


Monocytes # (Auto)


 


 


 


 2.0 x10^3/uL


(0.0-1.1)


 


Eosinophils # (Auto)


 


 


 


 0.0 x10^3/uL


(0.0-0.7)


 


Basophils # (Auto)


 


 


 


 0.0 x10^3/uL


(0.0-0.2)


 


Sodium Level


 


 


 


 138 mmol/L


(136-145)


 


Potassium Level


 


 


 


 3.9 mmol/L


(3.5-5.1)


 


Chloride Level


 


 


 


 101 mmol/L


()


 


Carbon Dioxide Level


 


 


 


 25 mmol/L


(21-32)


 


Anion Gap    12 (6-14) 


 


Blood Urea Nitrogen


 


 


 


 16 mg/dL


(8-26)


 


Creatinine


 


 


 


 1.0 mg/dL


(0.7-1.3)


 


Estimated GFR


(Cockcroft-Gault) 


 


 


 99.2 





 


Glucose Level


 


 


 


 173 mg/dL


(70-99)


 


Calcium Level


 


 


 


 7.4 mg/dL


(8.5-10.1)


 


Test


 4/14/21


11:30 4/14/21


16:40 4/14/21


21:17 4/15/21


04:02


 


Glucose (Fingerstick)


 194 mg/dL


(70-99) 181 mg/dL


(70-99) 238 mg/dL


(70-99) 





 


White Blood Count


 


 


 


 14.3 x10^3/uL


(4.0-11.0)


 


Red Blood Count


 


 


 


 4.53 x10^6/uL


(4.30-5.70)


 


Hemoglobin


 


 


 


 11.0 g/dL


(13.0-17.5)


 


Hematocrit


 


 


 


 34.5 %


(39.0-53.0)


 


Mean Corpuscular Volume    76 fL () 


 


Mean Corpuscular Hemoglobin    24 pg (25-35) 


 


Mean Corpuscular Hemoglobin


Concent 


 


 


 32 g/dL


(31-37)


 


Red Cell Distribution Width


 


 


 


 15.4 %


(11.5-14.5)


 


Platelet Count


 


 


 


 315 x10^3/uL


(140-400)


 


Neutrophils (%) (Auto)    76 % (31-73) 


 


Lymphocytes (%) (Auto)    9 % (24-48) 


 


Monocytes (%) (Auto)    14 % (0-9) 


 


Eosinophils (%) (Auto)    0 % (0-3) 


 


Basophils (%) (Auto)    0 % (0-3) 


 


Neutrophils # (Auto)


 


 


 


 10.9 x10^3/uL


(1.8-7.7)


 


Lymphocytes # (Auto)


 


 


 


 1.3 x10^3/uL


(1.0-4.8)


 


Monocytes # (Auto)


 


 


 


 2.1 x10^3/uL


(0.0-1.1)


 


Eosinophils # (Auto)


 


 


 


 0.0 x10^3/uL


(0.0-0.7)


 


Basophils # (Auto)


 


 


 


 0.0 x10^3/uL


(0.0-0.2)


 


Sodium Level


 


 


 


 136 mmol/L


(136-145)


 


Potassium Level


 


 


 


 4.0 mmol/L


(3.5-5.1)


 


Chloride Level


 


 


 


 102 mmol/L


()


 


Carbon Dioxide Level


 


 


 


 27 mmol/L


(21-32)


 


Anion Gap    7 (6-14) 


 


Blood Urea Nitrogen


 


 


 


 15 mg/dL


(8-26)


 


Creatinine


 


 


 


 1.0 mg/dL


(0.7-1.3)


 


Estimated GFR


(Cockcroft-Gault) 


 


 


 99.2 





 


Glucose Level


 


 


 


 166 mg/dL


(70-99)


 


Calcium Level


 


 


 


 7.4 mg/dL


(8.5-10.1)


 


Test


 4/15/21


08:02 


 


 





 


Glucose (Fingerstick)


 155 mg/dL


(70-99) 


 


 











Laboratory Tests








Test


 4/14/21


11:30 4/14/21


16:40 4/14/21


21:17 4/15/21


04:02


 


Glucose (Fingerstick)


 194 mg/dL


(70-99) 181 mg/dL


(70-99) 238 mg/dL


(70-99) 





 


White Blood Count


 


 


 


 14.3 x10^3/uL


(4.0-11.0)


 


Red Blood Count


 


 


 


 4.53 x10^6/uL


(4.30-5.70)


 


Hemoglobin


 


 


 


 11.0 g/dL


(13.0-17.5)


 


Hematocrit


 


 


 


 34.5 %


(39.0-53.0)


 


Mean Corpuscular Volume    76 fL () 


 


Mean Corpuscular Hemoglobin    24 pg (25-35) 


 


Mean Corpuscular Hemoglobin


Concent 


 


 


 32 g/dL


(31-37)


 


Red Cell Distribution Width


 


 


 


 15.4 %


(11.5-14.5)


 


Platelet Count


 


 


 


 315 x10^3/uL


(140-400)


 


Neutrophils (%) (Auto)    76 % (31-73) 


 


Lymphocytes (%) (Auto)    9 % (24-48) 


 


Monocytes (%) (Auto)    14 % (0-9) 


 


Eosinophils (%) (Auto)    0 % (0-3) 


 


Basophils (%) (Auto)    0 % (0-3) 


 


Neutrophils # (Auto)


 


 


 


 10.9 x10^3/uL


(1.8-7.7)


 


Lymphocytes # (Auto)


 


 


 


 1.3 x10^3/uL


(1.0-4.8)


 


Monocytes # (Auto)


 


 


 


 2.1 x10^3/uL


(0.0-1.1)


 


Eosinophils # (Auto)


 


 


 


 0.0 x10^3/uL


(0.0-0.7)


 


Basophils # (Auto)


 


 


 


 0.0 x10^3/uL


(0.0-0.2)


 


Sodium Level


 


 


 


 136 mmol/L


(136-145)


 


Potassium Level


 


 


 


 4.0 mmol/L


(3.5-5.1)


 


Chloride Level


 


 


 


 102 mmol/L


()


 


Carbon Dioxide Level


 


 


 


 27 mmol/L


(21-32)


 


Anion Gap    7 (6-14) 


 


Blood Urea Nitrogen


 


 


 


 15 mg/dL


(8-26)


 


Creatinine


 


 


 


 1.0 mg/dL


(0.7-1.3)


 


Estimated GFR


(Cockcroft-Gault) 


 


 


 99.2 





 


Glucose Level


 


 


 


 166 mg/dL


(70-99)


 


Calcium Level


 


 


 


 7.4 mg/dL


(8.5-10.1)


 


Test


 4/15/21


08:02 


 


 





 


Glucose (Fingerstick)


 155 mg/dL


(70-99) 


 


 











Medications





Active Scripts








 Medications  Dose


 Route/Sig


 Max Daily Dose Days Date Category


 


 Novolog (Insulin


 Aspart) 100


 Unit/1 Ml


 Cartridge  30 Unit


 SQ TIDWMEALS


   4/13/21 Reported


 


 Norco 5-325


 Tablet


  (Acetaminophen/Hydrocodone


 Bitart) 1 Each


 Tablet  1 Tab


 PO PRN Q6HRS PRN


  5 1/5/21 Rx


 


 Pantoprazole


 Sodium  **


  (Pantoprazole


 Sodium) 40 Mg


 Tablet.dr  40 Mg


 PO DAILYAC


  30 1/5/21 Rx


 


 Montelukast


 Sodium Tablet  **


  (Montelukast


 Sodium) 10 Mg


 Tablet  10 Mg


 PO QHS


  30 1/5/21 Rx


 


 Cozaar **


  (Losartan


 Potassium) 50 Mg


 Tablet  50 Mg


 PO DAILY


  30 1/5/21 Rx


 


 Combivent


 Respimat Inhal


  (Ipratropium/Albuterol


 Sulfate) 4 Gm


 Aer.w.adap  2 Inh


 IH QID


  30 1/5/21 Rx


 


 Prednisone 20 Mg


 Tablet  1 Tab


 PO DAILY


  5 1/5/21 Rx


 


 Doxycycline


 Hyclate 100 Mg


 Tablet  100 Mg


 PO BID


  7 1/5/21 Rx


 


 Sertraline Hcl


 100 Mg Tablet  100 Mg


 PO DAILY


  30 1/5/21 Rx


 


 Trazodone Hcl 100


 Mg Tablet  100 Mg


 PO PRN QHS PRN


  30 1/5/21 Rx


 


 Lantus Solostar


  (Insulin


 Glargine,Hum.rec.anlog)


 100 Unit/1 Ml


 Insuln.pen  30 Unit


 SQ BID


  30 1/5/21 Rx


 


 Atorvastatin


 Calcium 40 Mg


 Tablet  40 Mg


 PO QHS


   3/20/19 Reported


 


 Fluticasone


 Propionate Nasal


 Spray


  (Fluticasone


 Propionate) 16 Gm


 Spray.susp  2 Spr


 ASIM DAILY


   3/20/19 Reported


 


 Aspirin 81 Mg


 Tab.chew  1 Tab


 PO DAILY


   1/25/16 Reported











Impression


.


IMPRESSION:


1.  Acute hypoxic respiratory failure with high-grade fever.  No definite


infiltrates seen on the CT chest.  


2.  No significant tobacco history.


3.  Morbid obesity with underlying sleep apnea.  He is awaiting CPAP,  details


of his sleep study not available.


4.  Influenza screen negative. 


5.  Leukocytosis.


6.  SARS-CoV-2 negative


7.  UTI, per PCP


8.  Bacteremia











--------------

------------------------------------------------------------------------------





  URINE CULTURE  Final  


        Final





        GREATER THAN 100,000 CFU/ML GRAM NEGATIVE RODS on 04/14/21


        at 0821


        FINAL ID= [PROTEUS [id number]]


                           Testing Performed by:


                         [address] [address]


          For Inquires, the Physician may contact the Microbiology


                        department at [phone]


        PROTEUS MIRABILIS





Plan


.


Updated 4/15


SARS-CoV-2 negative, continue current support


Discontinue steroids


DVT GI prophylaxis


Antibiotics per ID


DVT GI prophylax














4/14


1.  Continue present oxygen to keep saturation 92 and above.


2.  Follow final BC/ Urine has Proteus


3.  Rule out COVID-19.


4.  Continue broad-spectrum antibiotics.


5.  Lovenox for DVT prophylaxis.


6.  The patient was initiated on dexamethasone.  If COVID tests come back


negative, then we will discontinue steroids. 


 Discussed with PCP











MANFRED RUTLEDGE MD              Apr 15, 2021 09:09

## 2021-04-15 NOTE — PDOC
[facility] PROGRESS NOTE


Date of Service


DOS:


DATE: 4/15/21 


TIME: 09:48





Chief Complaint


Chief Complaint


Sepsis


Acute respiratory failure with hypoxia


Right pyelonephritis


DM2 with hyperglycemia


Moderate malnutrition


COVID-19 PUI





Plan:


Patient received fluids and broad-spectrum antibiotics and the ED.


Will continue treatment of asthma exacerbation and pyelonephritis with Rocephin 

daily and doxycycline IV twice daily.


Consultation to pulmonology acute hypoxia COVID-19 PUI


Treat empirically with Decadron 6 mg daily


COVID-19 and influenza pending


Basal/prandial insulin; A1c pending.


Resume home medications


FEN - Cardiac diet


PPX  - Lovenox


FULL CODE


Dispo - inpatient for above; patient names his girlfriend as his surrogate 

decision-maker.





History of Present Illness


History of Present Illness


Patient is a 40 old male with past medical history asthma, hypertension, DM2, 

presents to the ER with complaints of worsening shortness of breath over the 

past 2 days.  Reports associated nausea, vomiting, diarrhea, body aches, fever, 

chills, and polyuria.  He has been taking his home albuterol inhaler, symptoms 

acutely worsened when he ran out with this medication.  Upon arrival to the ED 

he was tachypnea, febrile, saturating 99% on 5 L nasal cannula.  CT 

chest/abdomen/pelvis obtained on admission showed subtle right perinephric 

stranding; gallstones, no PE or chest abnormality.  Will admit patient for 

further medical management.





4/15/2021


Patient seen and evaluated bedside.  He is afebrile, currently breathing on 2 L 

nasal cannula.  He is COVID-19 negative.  Urine and blood cultures positive for 

Proteus mirabilis; sensitivities largely pansensitive except resistant to 

nitrofurantoin and tetracycline..  Will continue treatment of his pyelonephritis

with Zosyn, per ID.  He still complains of epigastric pain pain with deep 

inspiration, and flank pain.  Some right upper quadrant tenderness on exam, he 

does report some pain after eating.  On admission CT did show cholelithiasis.  

Will obtain ultrasound to rule out cholecystitis.  Discussed with RN.





4/14/2021


Afebrile, currently breathing on 2 L nasal cannula.  He denies any sensation of 

shortness of breath.  Discussed with Dr. Yun, will concern for COVID-19 based 

on CT results.  COVID-19 test still pending at this time.  Urine cultures 

positive for Proteus mirabilis; blood cultures positive for Proteus mirabilis as

well.  Continue treatment for pyelonephritis with Zosyn, per ID.  Will follow 

sensitivities.  If COVID-19 positive, will initiate remdesivir.





Vitals/I&O


Vitals/I&O:





                                   Vital Signs








  Date Time  Temp Pulse Resp B/P (MAP) Pulse Ox O2 Delivery O2 Flow Rate FiO2


 


4/15/21 09:05  89  127/89    


 


4/15/21 08:00      Nasal Cannula 3.0 


 


4/15/21 07:00   18  100   


 


4/15/21 03:00 98.8       





 98.8       














                                    I & O   


 


 4/14/21 4/14/21 4/15/21





 15:00 23:00 07:00


 


Intake Total 600 ml 600 ml 750 ml


 


Output Total  800 ml 1450 ml


 


Balance 600 ml -200 ml -700 ml











Physical Exam


Physical Exam:


GENERAL:  Alert, oriented x 3, morbidly obese male, nontoxic appearing, lying in


bed comfortably, appears tired.


HEENT:  Normocephalic, atraumatic.  Anicteric.  No thrush.


NECK:  Fullness present.


LUNGS: Decreased breath sounds bilaterally


HEART:  S1, S2.


ABDOMEN:  Morbidly obese.  Right flank pain present.  Right upper quadrant 

tenderness.


EXTREMITIES:  No edema, no cyanosis.


DERMATOLOGIC:  Warm and dry.  No generalized rash.


NEUROLOGIC:  Alert, oriented x 3, grossly nonfocal.


PSYCHIATRIC:  Cooperative, appropriate mood and affect.


General:  Alert


Heart:  Regular rate


Lungs:  Other





Labs


Labs:





Laboratory Tests








Test


 4/14/21


11:30 4/14/21


16:40 4/14/21


21:17 4/15/21


04:02


 


Glucose (Fingerstick)


 194 mg/dL


(70-99) 181 mg/dL


(70-99) 238 mg/dL


(70-99) 





 


White Blood Count


 


 


 


 14.3 x10^3/uL


(4.0-11.0)


 


Red Blood Count


 


 


 


 4.53 x10^6/uL


(4.30-5.70)


 


Hemoglobin


 


 


 


 11.0 g/dL


(13.0-17.5)


 


Hematocrit


 


 


 


 34.5 %


(39.0-53.0)


 


Mean Corpuscular Volume    76 fL () 


 


Mean Corpuscular Hemoglobin    24 pg (25-35) 


 


Mean Corpuscular Hemoglobin


Concent 


 


 


 32 g/dL


(31-37)


 


Red Cell Distribution Width


 


 


 


 15.4 %


(11.5-14.5)


 


Platelet Count


 


 


 


 315 x10^3/uL


(140-400)


 


Neutrophils (%) (Auto)    76 % (31-73) 


 


Lymphocytes (%) (Auto)    9 % (24-48) 


 


Monocytes (%) (Auto)    14 % (0-9) 


 


Eosinophils (%) (Auto)    0 % (0-3) 


 


Basophils (%) (Auto)    0 % (0-3) 


 


Neutrophils # (Auto)


 


 


 


 10.9 x10^3/uL


(1.8-7.7)


 


Lymphocytes # (Auto)


 


 


 


 1.3 x10^3/uL


(1.0-4.8)


 


Monocytes # (Auto)


 


 


 


 2.1 x10^3/uL


(0.0-1.1)


 


Eosinophils # (Auto)


 


 


 


 0.0 x10^3/uL


(0.0-0.7)


 


Basophils # (Auto)


 


 


 


 0.0 x10^3/uL


(0.0-0.2)


 


Sodium Level


 


 


 


 136 mmol/L


(136-145)


 


Potassium Level


 


 


 


 4.0 mmol/L


(3.5-5.1)


 


Chloride Level


 


 


 


 102 mmol/L


()


 


Carbon Dioxide Level


 


 


 


 27 mmol/L


(21-32)


 


Anion Gap    7 (6-14) 


 


Blood Urea Nitrogen


 


 


 


 15 mg/dL


(8-26)


 


Creatinine


 


 


 


 1.0 mg/dL


(0.7-1.3)


 


Estimated GFR


(Cockcroft-Gault) 


 


 


 99.2 





 


Glucose Level


 


 


 


 166 mg/dL


(70-99)


 


Calcium Level


 


 


 


 7.4 mg/dL


(8.5-10.1)


 


Test


 4/15/21


08:02 


 


 





 


Glucose (Fingerstick)


 155 mg/dL


(70-99) 


 


 














Assessment and Plan


Assessmemt and Plan


Problems


Medical Problems:


(1) Hypomagnesemia


Status: Acute  





(2) Pyelonephritis


Status: Acute  





(3) Septic shock


Status: Acute  





(4) Suspected 2019 novel coronavirus infection


Status: Acute  








Goals of Care:


Advance Care Planning: Total time spent face-to-face with patient greater than 

16 minutes in discussion with goals of care, comfort care, end-of-life care, 

pain management, code status





Comment


Review of Relevant


I have reviewed the following items martha (where applicable) has been applied.





Justifications for Admission


Other Justification


Acute respiratory failure with hypoxia, pyelonephritis, COVID-19 GARY ENRIQUEZ MD            Apr 15, 2021 09:52

## 2021-04-16 VITALS — SYSTOLIC BLOOD PRESSURE: 149 MMHG | DIASTOLIC BLOOD PRESSURE: 78 MMHG

## 2021-04-16 VITALS — DIASTOLIC BLOOD PRESSURE: 87 MMHG | SYSTOLIC BLOOD PRESSURE: 156 MMHG

## 2021-04-16 VITALS — DIASTOLIC BLOOD PRESSURE: 78 MMHG | SYSTOLIC BLOOD PRESSURE: 138 MMHG

## 2021-04-16 VITALS — DIASTOLIC BLOOD PRESSURE: 84 MMHG | SYSTOLIC BLOOD PRESSURE: 135 MMHG

## 2021-04-16 VITALS — DIASTOLIC BLOOD PRESSURE: 70 MMHG | SYSTOLIC BLOOD PRESSURE: 147 MMHG

## 2021-04-16 VITALS — DIASTOLIC BLOOD PRESSURE: 74 MMHG | SYSTOLIC BLOOD PRESSURE: 134 MMHG

## 2021-04-16 LAB
ANION GAP SERPL CALC-SCNC: 8 MMOL/L (ref 6–14)
BASOPHILS # BLD AUTO: 0 X10^3/UL (ref 0–0.2)
BASOPHILS NFR BLD: 0 % (ref 0–3)
BUN SERPL-MCNC: 13 MG/DL (ref 8–26)
CALCIUM SERPL-MCNC: 7.5 MG/DL (ref 8.5–10.1)
CHLORIDE SERPL-SCNC: 102 MMOL/L (ref 98–107)
CO2 SERPL-SCNC: 28 MMOL/L (ref 21–32)
CREAT SERPL-MCNC: 0.9 MG/DL (ref 0.7–1.3)
EOSINOPHIL NFR BLD: 0.1 X10^3/UL (ref 0–0.7)
EOSINOPHIL NFR BLD: 1 % (ref 0–3)
ERYTHROCYTE [DISTWIDTH] IN BLOOD BY AUTOMATED COUNT: 15.2 % (ref 11.5–14.5)
GFR SERPLBLD BASED ON 1.73 SQ M-ARVRAT: 112 ML/MIN
GLUCOSE SERPL-MCNC: 176 MG/DL (ref 70–99)
HCT VFR BLD CALC: 34.7 % (ref 39–53)
HGB BLD-MCNC: 11.1 G/DL (ref 13–17.5)
LYMPHOCYTES # BLD: 1.5 X10^3/UL (ref 1–4.8)
LYMPHOCYTES NFR BLD AUTO: 13 % (ref 24–48)
MCH RBC QN AUTO: 25 PG (ref 25–35)
MCHC RBC AUTO-ENTMCNC: 32 G/DL (ref 31–37)
MCV RBC AUTO: 77 FL (ref 79–100)
MONO #: 1.7 X10^3/UL (ref 0–1.1)
MONOCYTES NFR BLD: 15 % (ref 0–9)
NEUT #: 8.3 X10^3/UL (ref 1.8–7.7)
NEUTROPHILS NFR BLD AUTO: 71 % (ref 31–73)
PLATELET # BLD AUTO: 346 X10^3/UL (ref 140–400)
POTASSIUM SERPL-SCNC: 4 MMOL/L (ref 3.5–5.1)
RBC # BLD AUTO: 4.51 X10^6/UL (ref 4.3–5.7)
SODIUM SERPL-SCNC: 138 MMOL/L (ref 136–145)
WBC # BLD AUTO: 11.6 X10^3/UL (ref 4–11)

## 2021-04-16 RX ADMIN — HYDROCODONE BITARTRATE AND ACETAMINOPHEN PRN TAB: 5; 325 TABLET ORAL at 02:23

## 2021-04-16 RX ADMIN — ATORVASTATIN CALCIUM SCH MG: 40 TABLET, FILM COATED ORAL at 21:04

## 2021-04-16 RX ADMIN — INSULIN GLARGINE SCH UNIT: 100 INJECTION, SOLUTION SUBCUTANEOUS at 09:20

## 2021-04-16 RX ADMIN — INSULIN LISPRO SCH UNITS: 100 INJECTION, SOLUTION INTRAVENOUS; SUBCUTANEOUS at 11:49

## 2021-04-16 RX ADMIN — Medication SCH CAP: at 21:04

## 2021-04-16 RX ADMIN — PIPERACILLIN SODIUM AND TAZOBACTAM SODIUM SCH MLS/HR: 3; .375 INJECTION, POWDER, LYOPHILIZED, FOR SOLUTION INTRAVENOUS at 05:55

## 2021-04-16 RX ADMIN — MONTELUKAST SODIUM SCH MG: 10 TABLET, FILM COATED ORAL at 21:04

## 2021-04-16 RX ADMIN — INSULIN LISPRO SCH UNITS: 100 INJECTION, SOLUTION INTRAVENOUS; SUBCUTANEOUS at 11:54

## 2021-04-16 RX ADMIN — HYDROCODONE BITARTRATE AND ACETAMINOPHEN PRN TAB: 5; 325 TABLET ORAL at 19:41

## 2021-04-16 RX ADMIN — PIPERACILLIN SODIUM AND TAZOBACTAM SODIUM SCH MLS/HR: 3; .375 INJECTION, POWDER, LYOPHILIZED, FOR SOLUTION INTRAVENOUS at 11:51

## 2021-04-16 RX ADMIN — HYDROCODONE BITARTRATE AND ACETAMINOPHEN PRN TAB: 5; 325 TABLET ORAL at 09:15

## 2021-04-16 RX ADMIN — TRAZODONE HYDROCHLORIDE PRN MG: 100 TABLET ORAL at 21:04

## 2021-04-16 RX ADMIN — ENOXAPARIN SODIUM SCH MG: 100 INJECTION SUBCUTANEOUS at 09:15

## 2021-04-16 RX ADMIN — CEFTRIAXONE SODIUM SCH GM: 2 INJECTION, POWDER, FOR SOLUTION INTRAMUSCULAR; INTRAVENOUS at 13:17

## 2021-04-16 RX ADMIN — INSULIN LISPRO SCH UNITS: 100 INJECTION, SOLUTION INTRAVENOUS; SUBCUTANEOUS at 16:37

## 2021-04-16 RX ADMIN — INSULIN LISPRO SCH UNITS: 100 INJECTION, SOLUTION INTRAVENOUS; SUBCUTANEOUS at 08:16

## 2021-04-16 RX ADMIN — ENOXAPARIN SODIUM SCH MG: 100 INJECTION SUBCUTANEOUS at 21:05

## 2021-04-16 RX ADMIN — LOSARTAN POTASSIUM SCH MG: 50 TABLET ORAL at 09:14

## 2021-04-16 RX ADMIN — ASPIRIN 81 MG SCH MG: 81 TABLET ORAL at 09:14

## 2021-04-16 RX ADMIN — FLUTICASONE PROPIONATE SCH SPRAY: 50 SPRAY, METERED NASAL at 09:14

## 2021-04-16 RX ADMIN — INSULIN GLARGINE SCH UNIT: 100 INJECTION, SOLUTION SUBCUTANEOUS at 21:08

## 2021-04-16 RX ADMIN — FLUTICASONE FUROATE AND VILANTEROL TRIFENATATE SCH PUFF: 100; 25 POWDER RESPIRATORY (INHALATION) at 09:14

## 2021-04-16 NOTE — PDOC
[facility] PROGRESS NOTE


Date of Service


DOS:


DATE: 4/16/21 


TIME: 15:11





Chief Complaint


Chief Complaint


Sepsis


Acute respiratory failure with hypoxia


Right pyelonephritis


DM2 with hyperglycemia


Moderate malnutrition


COVID-19 PUI





Plan:


Patient received fluids and broad-spectrum antibiotics and the ED.


Will continue treatment of asthma exacerbation and pyelonephritis with Rocephin 

daily and doxycycline IV twice daily.


Consultation to pulmonology acute hypoxia COVID-19 PUI


Treat empirically with Decadron 6 mg daily


COVID-19 and influenza pending


Basal/prandial insulin; A1c pending.


Resume home medications


FEN - Cardiac diet


PPX  - Lovenox


FULL CODE


Dispo - inpatient for above; patient names his girlfriend as his surrogate 

decision-maker.





History of Present Illness


History of Present Illness


Patient is a 40 old male with past medical history asthma, hypertension, DM2, 

presents to the ER with complaints of worsening shortness of breath over the 

past 2 days.  Reports associated nausea, vomiting, diarrhea, body aches, fever, 

chills, and polyuria.  He has been taking his home albuterol inhaler, symptoms 

acutely worsened when he ran out with this medication.  Upon arrival to the ED 

he was tachypnea, febrile, saturating 99% on 5 L nasal cannula.  CT 

chest/abdomen/pelvis obtained on admission showed subtle right perinephric 

stranding; gallstones, no PE or chest abnormality.  Will admit patient for 

further medical management.





4/16/2021


Patient seen and evaluated bedside.  States he feels better, denies fever.  

Denies chest pain or diarrhea.  Discussed with ID, will continue on IV Rocephin 

for now, and likely switch to cefdinir 100 mg p.o. twice daily for 10 days when 

ready to discharge likely on Monday.  At that time will obtain 6-minute walk. 

Still breathing on 2 L nasal cannula; will need 6-minute walk prior to discharge

due to suspected obesity hypoventilation syndrome or BC.





4/15/2021


Patient seen and evaluated bedside.  He is afebrile, currently breathing on 2 L 

nasal cannula.  He is COVID-19 negative.  Urine and blood cultures positive for 

Proteus mirabilis; sensitivities largely pansensitive except resistant to 

nitrofurantoin and tetracycline..  Will continue treatment of his pyelonephritis

with Zosyn, per ID.  He still complains of epigastric pain pain with deep 

inspiration, and flank pain.  Some right upper quadrant tenderness on exam, he 

does report some pain after eating.  On admission CT did show cholelithiasis.  

Will obtain ultrasound to rule out cholecystitis.  Discussed with RN.





4/14/2021


Afebrile, currently breathing on 2 L nasal cannula.  He denies any sensation of 

shortness of breath.  Discussed with Dr. Yun, will concern for COVID-19 based o

n CT results.  COVID-19 test still pending at this time.  Urine cultures 

positive for Proteus mirabilis; blood cultures positive for Proteus mirabilis as

well.  Continue treatment for pyelonephritis with Zosyn, per ID.  Will follow 

sensitivities.  If COVID-19 positive, will initiate remdesivir.





Vitals/I&O


Vitals/I&O:





                                   Vital Signs








  Date Time  Temp Pulse Resp B/P (MAP) Pulse Ox O2 Delivery O2 Flow Rate FiO2


 


4/16/21 14:47 97.6 84 22 138/78 (98) 100 Nasal Cannula 3.0 





 97.6       














                                    I & O   


 


 4/15/21 4/15/21 4/16/21





 15:00 23:00 07:00


 


Intake Total 300 ml 500 ml 50 ml


 


Output Total 500 ml 600 ml 1000 ml


 


Balance -200 ml -100 ml -950 ml











Physical Exam


Physical Exam:


GENERAL:  Alert, oriented x 3, morbidly obese male, nontoxic appearing, lying in


bed comfortably,


HEENT:  Normocephalic, atraumatic.  Anicteric.  No thrush.


NECK:  Fullness present.


LUNGS: Decreased breath sounds bilaterally


HEART:  S1, S2.


ABDOMEN:  Morbidly obese.  Bowel sounds present, no rebound or guarding


EXTREMITIES:  No edema, no cyanosis.


DERMATOLOGIC:  Warm and dry.  No generalized rash.


NEUROLOGIC:  Alert, oriented x 3, grossly nonfocal.


PSYCHIATRIC:  Cooperative, appropriate mood and affect.


General:  Alert


Heart:  Regular rate


Lungs:  Clear


Abdomen:  Soft, Other (Right upper quadrant tenderness)


Extremities:  No clubbing, No cyanosis


Skin:  No rashes





Labs


Labs:





Laboratory Tests








Test


 4/15/21


16:40 4/15/21


21:27 4/16/21


04:00 4/16/21


08:02


 


Glucose (Fingerstick)


 148 mg/dL


(70-99) 230 mg/dL


(70-99) 


 176 mg/dL


(70-99)


 


White Blood Count


 


 


 11.6 x10^3/uL


(4.0-11.0) 





 


Red Blood Count


 


 


 4.51 x10^6/uL


(4.30-5.70) 





 


Hemoglobin


 


 


 11.1 g/dL


(13.0-17.5) 





 


Hematocrit


 


 


 34.7 %


(39.0-53.0) 





 


Mean Corpuscular Volume   77 fL ()  


 


Mean Corpuscular Hemoglobin   25 pg (25-35)  


 


Mean Corpuscular Hemoglobin


Concent 


 


 32 g/dL


(31-37) 





 


Red Cell Distribution Width


 


 


 15.2 %


(11.5-14.5) 





 


Platelet Count


 


 


 346 x10^3/uL


(140-400) 





 


Neutrophils (%) (Auto)   71 % (31-73)  


 


Lymphocytes (%) (Auto)   13 % (24-48)  


 


Monocytes (%) (Auto)   15 % (0-9)  


 


Eosinophils (%) (Auto)   1 % (0-3)  


 


Basophils (%) (Auto)   0 % (0-3)  


 


Neutrophils # (Auto)


 


 


 8.3 x10^3/uL


(1.8-7.7) 





 


Lymphocytes # (Auto)


 


 


 1.5 x10^3/uL


(1.0-4.8) 





 


Monocytes # (Auto)


 


 


 1.7 x10^3/uL


(0.0-1.1) 





 


Eosinophils # (Auto)


 


 


 0.1 x10^3/uL


(0.0-0.7) 





 


Basophils # (Auto)


 


 


 0.0 x10^3/uL


(0.0-0.2) 





 


Sodium Level


 


 


 138 mmol/L


(136-145) 





 


Potassium Level


 


 


 4.0 mmol/L


(3.5-5.1) 





 


Chloride Level


 


 


 102 mmol/L


() 





 


Carbon Dioxide Level


 


 


 28 mmol/L


(21-32) 





 


Anion Gap   8 (6-14)  


 


Blood Urea Nitrogen


 


 


 13 mg/dL


(8-26) 





 


Creatinine


 


 


 0.9 mg/dL


(0.7-1.3) 





 


Estimated GFR


(Cockcroft-Gault) 


 


 112.0 


 





 


Glucose Level


 


 


 176 mg/dL


(70-99) 





 


Calcium Level


 


 


 7.5 mg/dL


(8.5-10.1) 





 


Test


 4/16/21


11:31 


 


 





 


Glucose (Fingerstick)


 113 mg/dL


(70-99) 


 


 














Assessment and Plan


Assessmemt and Plan


Problems


Medical Problems:


(1) Hypomagnesemia


Status: Acute  





(2) Pyelonephritis


Status: Acute  





(3) Septic shock


Status: Acute  





(4) Suspected 2019 novel coronavirus infection


Status: Acute  








Goals of Care:


Advance Care Planning: Total time spent face-to-face with patient greater than 

16 minutes in discussion with goals of care, comfort care, end-of-life care, 

pain management, code status





Comment


Review of Relevant


I have reviewed the following items martha (where applicable) has been applied.


Medications:





Current Medications








 Medications


  (Trade)  Dose


 Ordered  Sig/Pratik


 Route


 PRN Reason  Start Time


 Stop Time Status Last Admin


Dose Admin


 


 Ceftriaxone Sodium


  (Rocephin)  2 gm  Q24H


 IVP


   4/16/21 13:00


    4/16/21 13:17














Justifications for Admission


Other Justification


Acute respiratory failure with hypoxia, pyelonephritis, COVID-19 GARY ENRIQUEZ MD            Apr 16, 2021 15:15

## 2021-04-16 NOTE — NUR
Pt's dinner time FSBS 65. Pt asymptomatic, sitting at edge of bed. 240 cc of orange juice 
given, insulin held. Will recheck pt's FSBS after dinner is consumed.

## 2021-04-16 NOTE — PDOC
PULMONARY PROGRESS NOTES


DATE: 4/16/21 


TIME: 11:45


Subjective


pt. resting on 3 liters NC 


no SOA, cough or CP 


no overnight events


Vitals





Vital Signs








  Date Time  Temp Pulse Resp B/P (MAP) Pulse Ox O2 Delivery O2 Flow Rate FiO2


 


4/16/21 11:25 97.9 85 24 134/74 (94) 100 Nasal Cannula 3.0 





 97.9       








ROS:  No Nausea, No Chest Pain, No Abdominal Pain, No Increase Cough


General:  Alert, Oriented X4, No acute distress


Lungs:  Clear


Cardiovascular:  S1


Abdomen:  Soft, Other (obese)


Neuro Exam:  Alert


Extremities:  No Edema, Other (Some edema)


Labs





Laboratory Tests








Test


 4/14/21


16:40 4/14/21


21:17 4/15/21


04:02 4/15/21


08:02


 


Glucose (Fingerstick)


 181 mg/dL


(70-99) 238 mg/dL


(70-99) 


 155 mg/dL


(70-99)


 


White Blood Count


 


 


 14.3 x10^3/uL


(4.0-11.0) 





 


Red Blood Count


 


 


 4.53 x10^6/uL


(4.30-5.70) 





 


Hemoglobin


 


 


 11.0 g/dL


(13.0-17.5) 





 


Hematocrit


 


 


 34.5 %


(39.0-53.0) 





 


Mean Corpuscular Volume   76 fL ()  


 


Mean Corpuscular Hemoglobin   24 pg (25-35)  


 


Mean Corpuscular Hemoglobin


Concent 


 


 32 g/dL


(31-37) 





 


Red Cell Distribution Width


 


 


 15.4 %


(11.5-14.5) 





 


Platelet Count


 


 


 315 x10^3/uL


(140-400) 





 


Neutrophils (%) (Auto)   76 % (31-73)  


 


Lymphocytes (%) (Auto)   9 % (24-48)  


 


Monocytes (%) (Auto)   14 % (0-9)  


 


Eosinophils (%) (Auto)   0 % (0-3)  


 


Basophils (%) (Auto)   0 % (0-3)  


 


Neutrophils # (Auto)


 


 


 10.9 x10^3/uL


(1.8-7.7) 





 


Lymphocytes # (Auto)


 


 


 1.3 x10^3/uL


(1.0-4.8) 





 


Monocytes # (Auto)


 


 


 2.1 x10^3/uL


(0.0-1.1) 





 


Eosinophils # (Auto)


 


 


 0.0 x10^3/uL


(0.0-0.7) 





 


Basophils # (Auto)


 


 


 0.0 x10^3/uL


(0.0-0.2) 





 


Sodium Level


 


 


 136 mmol/L


(136-145) 





 


Potassium Level


 


 


 4.0 mmol/L


(3.5-5.1) 





 


Chloride Level


 


 


 102 mmol/L


() 





 


Carbon Dioxide Level


 


 


 27 mmol/L


(21-32) 





 


Anion Gap   7 (6-14)  


 


Blood Urea Nitrogen


 


 


 15 mg/dL


(8-26) 





 


Creatinine


 


 


 1.0 mg/dL


(0.7-1.3) 





 


Estimated GFR


(Cockcroft-Gault) 


 


 99.2 


 





 


Glucose Level


 


 


 166 mg/dL


(70-99) 





 


Calcium Level


 


 


 7.4 mg/dL


(8.5-10.1) 





 


Test


 4/15/21


11:44 4/15/21


16:40 4/15/21


21:27 4/16/21


04:00


 


Glucose (Fingerstick)


 149 mg/dL


(70-99) 148 mg/dL


(70-99) 230 mg/dL


(70-99) 





 


White Blood Count


 


 


 


 11.6 x10^3/uL


(4.0-11.0)


 


Red Blood Count


 


 


 


 4.51 x10^6/uL


(4.30-5.70)


 


Hemoglobin


 


 


 


 11.1 g/dL


(13.0-17.5)


 


Hematocrit


 


 


 


 34.7 %


(39.0-53.0)


 


Mean Corpuscular Volume    77 fL () 


 


Mean Corpuscular Hemoglobin    25 pg (25-35) 


 


Mean Corpuscular Hemoglobin


Concent 


 


 


 32 g/dL


(31-37)


 


Red Cell Distribution Width


 


 


 


 15.2 %


(11.5-14.5)


 


Platelet Count


 


 


 


 346 x10^3/uL


(140-400)


 


Neutrophils (%) (Auto)    71 % (31-73) 


 


Lymphocytes (%) (Auto)    13 % (24-48) 


 


Monocytes (%) (Auto)    15 % (0-9) 


 


Eosinophils (%) (Auto)    1 % (0-3) 


 


Basophils (%) (Auto)    0 % (0-3) 


 


Neutrophils # (Auto)


 


 


 


 8.3 x10^3/uL


(1.8-7.7)


 


Lymphocytes # (Auto)


 


 


 


 1.5 x10^3/uL


(1.0-4.8)


 


Monocytes # (Auto)


 


 


 


 1.7 x10^3/uL


(0.0-1.1)


 


Eosinophils # (Auto)


 


 


 


 0.1 x10^3/uL


(0.0-0.7)


 


Basophils # (Auto)


 


 


 


 0.0 x10^3/uL


(0.0-0.2)


 


Sodium Level


 


 


 


 138 mmol/L


(136-145)


 


Potassium Level


 


 


 


 4.0 mmol/L


(3.5-5.1)


 


Chloride Level


 


 


 


 102 mmol/L


()


 


Carbon Dioxide Level


 


 


 


 28 mmol/L


(21-32)


 


Anion Gap    8 (6-14) 


 


Blood Urea Nitrogen


 


 


 


 13 mg/dL


(8-26)


 


Creatinine


 


 


 


 0.9 mg/dL


(0.7-1.3)


 


Estimated GFR


(Cockcroft-Gault) 


 


 


 112.0 





 


Glucose Level


 


 


 


 176 mg/dL


(70-99)


 


Calcium Level


 


 


 


 7.5 mg/dL


(8.5-10.1)


 


Test


 4/16/21


08:02 4/16/21


11:31 


 





 


Glucose (Fingerstick)


 176 mg/dL


(70-99) 113 mg/dL


(70-99) 


 











Laboratory Tests








Test


 4/15/21


16:40 4/15/21


21:27 4/16/21


04:00 4/16/21


08:02


 


Glucose (Fingerstick)


 148 mg/dL


(70-99) 230 mg/dL


(70-99) 


 176 mg/dL


(70-99)


 


White Blood Count


 


 


 11.6 x10^3/uL


(4.0-11.0) 





 


Red Blood Count


 


 


 4.51 x10^6/uL


(4.30-5.70) 





 


Hemoglobin


 


 


 11.1 g/dL


(13.0-17.5) 





 


Hematocrit


 


 


 34.7 %


(39.0-53.0) 





 


Mean Corpuscular Volume   77 fL ()  


 


Mean Corpuscular Hemoglobin   25 pg (25-35)  


 


Mean Corpuscular Hemoglobin


Concent 


 


 32 g/dL


(31-37) 





 


Red Cell Distribution Width


 


 


 15.2 %


(11.5-14.5) 





 


Platelet Count


 


 


 346 x10^3/uL


(140-400) 





 


Neutrophils (%) (Auto)   71 % (31-73)  


 


Lymphocytes (%) (Auto)   13 % (24-48)  


 


Monocytes (%) (Auto)   15 % (0-9)  


 


Eosinophils (%) (Auto)   1 % (0-3)  


 


Basophils (%) (Auto)   0 % (0-3)  


 


Neutrophils # (Auto)


 


 


 8.3 x10^3/uL


(1.8-7.7) 





 


Lymphocytes # (Auto)


 


 


 1.5 x10^3/uL


(1.0-4.8) 





 


Monocytes # (Auto)


 


 


 1.7 x10^3/uL


(0.0-1.1) 





 


Eosinophils # (Auto)


 


 


 0.1 x10^3/uL


(0.0-0.7) 





 


Basophils # (Auto)


 


 


 0.0 x10^3/uL


(0.0-0.2) 





 


Sodium Level


 


 


 138 mmol/L


(136-145) 





 


Potassium Level


 


 


 4.0 mmol/L


(3.5-5.1) 





 


Chloride Level


 


 


 102 mmol/L


() 





 


Carbon Dioxide Level


 


 


 28 mmol/L


(21-32) 





 


Anion Gap   8 (6-14)  


 


Blood Urea Nitrogen


 


 


 13 mg/dL


(8-26) 





 


Creatinine


 


 


 0.9 mg/dL


(0.7-1.3) 





 


Estimated GFR


(Cockcroft-Gault) 


 


 112.0 


 





 


Glucose Level


 


 


 176 mg/dL


(70-99) 





 


Calcium Level


 


 


 7.5 mg/dL


(8.5-10.1) 





 


Test


 4/16/21


11:31 


 


 





 


Glucose (Fingerstick)


 113 mg/dL


(70-99) 


 


 











Medications





Active Scripts








 Medications  Dose


 Route/Sig


 Max Daily Dose Days Date Category


 


 Novolog (Insulin


 Aspart) 100


 Unit/1 Ml


 Cartridge  30 Unit


 SQ TIDWMEALS


   4/13/21 Reported


 


 Norco 5-325


 Tablet


  (Acetaminophen/Hydrocodone


 Bitart) 1 Each


 Tablet  1 Tab


 PO PRN Q6HRS PRN


  5 1/5/21 Rx


 


 Pantoprazole


 Sodium  **


  (Pantoprazole


 Sodium) 40 Mg


 Tablet.dr  40 Mg


 PO DAILYAC


  30 1/5/21 Rx


 


 Montelukast


 Sodium Tablet  **


  (Montelukast


 Sodium) 10 Mg


 Tablet  10 Mg


 PO QHS


  30 1/5/21 Rx


 


 Cozaar **


  (Losartan


 Potassium) 50 Mg


 Tablet  50 Mg


 PO DAILY


  30 1/5/21 Rx


 


 Combivent


 Respimat Inhal


  (Ipratropium/Albuterol


 Sulfate) 4 Gm


 Aer.w.adap  2 Inh


 IH QID


  30 1/5/21 Rx


 


 Prednisone 20 Mg


 Tablet  1 Tab


 PO DAILY


  5 1/5/21 Rx


 


 Doxycycline


 Hyclate 100 Mg


 Tablet  100 Mg


 PO BID


  7 1/5/21 Rx


 


 Sertraline Hcl


 100 Mg Tablet  100 Mg


 PO DAILY


  30 1/5/21 Rx


 


 Trazodone Hcl 100


 Mg Tablet  100 Mg


 PO PRN QHS PRN


  30 1/5/21 Rx


 


 Lantus Solostar


  (Insulin


 Glargine,Hum.rec.anlog)


 100 Unit/1 Ml


 Insuln.pen  30 Unit


 SQ BID


  30 1/5/21 Rx


 


 Atorvastatin


 Calcium 40 Mg


 Tablet  40 Mg


 PO QHS


   3/20/19 Reported


 


 Fluticasone


 Propionate Nasal


 Spray


  (Fluticasone


 Propionate) 16 Gm


 Spray.susp  2 Spr


 ASIM DAILY


   3/20/19 Reported


 


 Aspirin 81 Mg


 Tab.chew  1 Tab


 PO DAILY


   1/25/16 Reported











Impression


.


IMPRESSION:


1.  Acute hypoxic respiratory failure with high-grade fever.  No definite


infiltrates seen on the CT chest.  


2.  No significant tobacco history.


3.  Morbid obesity with underlying sleep apnea.  He is awaiting CPAP,  details


of his sleep study not available.


4.  Influenza screen negative. 


5.  Leukocytosis.


6.  SARS-CoV-2 negative


7.  UTI, per PCP


8.  Bacteremia











-----------------------------

---------------------------------------------------------------





  URINE CULTURE  Final  


        Final





        GREATER THAN 100,000 CFU/ML GRAM NEGATIVE RODS on 04/14/21


        at 0821


        FINAL ID= [PROTEUS MIRABILIS]


                           Testing Performed by:


                         [address] [address]


          For Inquires, the Physician may contact the Microbiology


                        department at [phone]


        PROTEUS MIRABILIS





Plan


.


Updated 4/16 


Continue supplemental oxygen to keep sats above 92%, wean oxygen as tolerated 


SARS-CoV-2 negative


Continue ABX per ID


Educated on importance of weight loss 


Pt. would benefit from out patient sleep study 


DVT/GI PPX 


D/W RN 








Updated 4/15


SARS-CoV-2 negative, continue current support


Discontinue steroids


DVT GI prophylaxis


Antibiotics per ID


DVT GI prophylax








4/14


1.  Continue present oxygen to keep saturation 92 and above.


2.  Follow final BC/ Urine has Proteus


3.  Rule out COVID-19.


4.  Continue broad-spectrum antibiotics.


5.  Lovenox for DVT prophylaxis.


6.  The patient was initiated on dexamethasone.  If COVID tests come back


negative, then we will discontinue steroids. 


 Discussed with PCP











MOLLY DIANA MD                 Apr 16, 2021 11:47

## 2021-04-16 NOTE — PDOC
Infectious Disease Note


Subjective:


Subjective


Patient feels better today


Fever has resolved


Flank pain is improving


Shortness of breath is improved


Tolerating p.o. intake well





Vital Signs:


Vital Signs





Vital Signs








  Date Time  Temp Pulse Resp B/P (MAP) Pulse Ox O2 Delivery O2 Flow Rate FiO2


 


4/16/21 09:14  78  156/87    


 


4/16/21 08:28      Nasal Cannula 3.0 


 


4/16/21 07:00 97.6  24  100   





 97.6       











Physical Exam:


PHYSICAL EXAM


GENERAL:  Alert, oriented x 3, morbidly obese male, nontoxic appearing, lying in


bed comfortably,


HEENT:  Normocephalic, atraumatic.  Anicteric.  No thrush.


NECK:  Fullness present.


LUNGS: Decreased breath sounds bilaterally


HEART:  S1, S2.


ABDOMEN:  Morbidly obese.  Bowel sounds present, no rebound or guarding


EXTREMITIES:  No edema, no cyanosis.


DERMATOLOGIC:  Warm and dry.  No generalized rash.


NEUROLOGIC:  Alert, oriented x 3, grossly nonfocal.


PSYCHIATRIC:  Cooperative, appropriate mood and affect.





Medications:


Inpatient Meds:


Medications reviewed.





Labs:


Lab





Laboratory Tests








Test


 4/15/21


11:44 4/15/21


16:40 4/15/21


21:27 4/16/21


04:00


 


Glucose (Fingerstick)


 149 mg/dL


(70-99) 148 mg/dL


(70-99) 230 mg/dL


(70-99) 





 


White Blood Count


 


 


 


 11.6 x10^3/uL


(4.0-11.0)


 


Red Blood Count


 


 


 


 4.51 x10^6/uL


(4.30-5.70)


 


Hemoglobin


 


 


 


 11.1 g/dL


(13.0-17.5)


 


Hematocrit


 


 


 


 34.7 %


(39.0-53.0)


 


Mean Corpuscular Volume    77 fL () 


 


Mean Corpuscular Hemoglobin    25 pg (25-35) 


 


Mean Corpuscular Hemoglobin


Concent 


 


 


 32 g/dL


(31-37)


 


Red Cell Distribution Width


 


 


 


 15.2 %


(11.5-14.5)


 


Platelet Count


 


 


 


 346 x10^3/uL


(140-400)


 


Neutrophils (%) (Auto)    71 % (31-73) 


 


Lymphocytes (%) (Auto)    13 % (24-48) 


 


Monocytes (%) (Auto)    15 % (0-9) 


 


Eosinophils (%) (Auto)    1 % (0-3) 


 


Basophils (%) (Auto)    0 % (0-3) 


 


Neutrophils # (Auto)


 


 


 


 8.3 x10^3/uL


(1.8-7.7)


 


Lymphocytes # (Auto)


 


 


 


 1.5 x10^3/uL


(1.0-4.8)


 


Monocytes # (Auto)


 


 


 


 1.7 x10^3/uL


(0.0-1.1)


 


Eosinophils # (Auto)


 


 


 


 0.1 x10^3/uL


(0.0-0.7)


 


Basophils # (Auto)


 


 


 


 0.0 x10^3/uL


(0.0-0.2)


 


Sodium Level


 


 


 


 138 mmol/L


(136-145)


 


Potassium Level


 


 


 


 4.0 mmol/L


(3.5-5.1)


 


Chloride Level


 


 


 


 102 mmol/L


()


 


Carbon Dioxide Level


 


 


 


 28 mmol/L


(21-32)


 


Anion Gap    8 (6-14) 


 


Blood Urea Nitrogen


 


 


 


 13 mg/dL


(8-26)


 


Creatinine


 


 


 


 0.9 mg/dL


(0.7-1.3)


 


Estimated GFR


(Cockcroft-Gault) 


 


 


 112.0 





 


Glucose Level


 


 


 


 176 mg/dL


(70-99)


 


Calcium Level


 


 


 


 7.5 mg/dL


(8.5-10.1)


 


Test


 4/16/21


08:02 


 


 





 


Glucose (Fingerstick)


 176 mg/dL


(70-99) 


 


 














Objective:


Assessment:





1.  Sepsis from gram-negative bacteremia.


2.  Proteus Mirabella's bacteremia.


3.  Fever.


4.  Leukocytosis.


5.  Lactic acidosis.


6.  Proteus mirabilis urinary tract infection.


7.  Nausea.


8.  Morbid obesity.


9.  Diabetes mellitus type 2.


10.  Covid PCR negative


11.  Cholelithiasis





Plan:


Plan of Care


DC Zosyn


Ceftriaxone for now until ready for discharge


Condition to cefdinir 100 mg p.o. twice daily for a total of 10 days


Probiotics


We will reassess patient on Monday


Call ID MD on call with any questions over the weekend


Maintain aspiration precaution


Continue supportive care





Discussed with NICA Mendiola MD           Apr 16, 2021 10:51

## 2021-04-17 VITALS — DIASTOLIC BLOOD PRESSURE: 88 MMHG | SYSTOLIC BLOOD PRESSURE: 135 MMHG

## 2021-04-17 VITALS — DIASTOLIC BLOOD PRESSURE: 68 MMHG | SYSTOLIC BLOOD PRESSURE: 135 MMHG

## 2021-04-17 VITALS — DIASTOLIC BLOOD PRESSURE: 80 MMHG | SYSTOLIC BLOOD PRESSURE: 136 MMHG

## 2021-04-17 VITALS — DIASTOLIC BLOOD PRESSURE: 96 MMHG | SYSTOLIC BLOOD PRESSURE: 140 MMHG

## 2021-04-17 VITALS — DIASTOLIC BLOOD PRESSURE: 93 MMHG | SYSTOLIC BLOOD PRESSURE: 137 MMHG

## 2021-04-17 VITALS — SYSTOLIC BLOOD PRESSURE: 132 MMHG | DIASTOLIC BLOOD PRESSURE: 87 MMHG

## 2021-04-17 LAB
ANION GAP SERPL CALC-SCNC: 10 MMOL/L (ref 6–14)
BASOPHILS # BLD AUTO: 0 X10^3/UL (ref 0–0.2)
BASOPHILS NFR BLD: 0 % (ref 0–3)
BUN SERPL-MCNC: 15 MG/DL (ref 8–26)
CALCIUM SERPL-MCNC: 7.8 MG/DL (ref 8.5–10.1)
CHLORIDE SERPL-SCNC: 102 MMOL/L (ref 98–107)
CO2 SERPL-SCNC: 27 MMOL/L (ref 21–32)
CREAT SERPL-MCNC: 0.9 MG/DL (ref 0.7–1.3)
EOSINOPHIL NFR BLD: 0.2 X10^3/UL (ref 0–0.7)
EOSINOPHIL NFR BLD: 2 % (ref 0–3)
ERYTHROCYTE [DISTWIDTH] IN BLOOD BY AUTOMATED COUNT: 15.6 % (ref 11.5–14.5)
GFR SERPLBLD BASED ON 1.73 SQ M-ARVRAT: 112 ML/MIN
GLUCOSE SERPL-MCNC: 176 MG/DL (ref 70–99)
HCT VFR BLD CALC: 35.8 % (ref 39–53)
HGB BLD-MCNC: 11.6 G/DL (ref 13–17.5)
LYMPHOCYTES # BLD: 1.8 X10^3/UL (ref 1–4.8)
LYMPHOCYTES NFR BLD AUTO: 15 % (ref 24–48)
MCH RBC QN AUTO: 25 PG (ref 25–35)
MCHC RBC AUTO-ENTMCNC: 32 G/DL (ref 31–37)
MCV RBC AUTO: 76 FL (ref 79–100)
MONO #: 1.7 X10^3/UL (ref 0–1.1)
MONOCYTES NFR BLD: 14 % (ref 0–9)
NEUT #: 8.6 X10^3/UL (ref 1.8–7.7)
NEUTROPHILS NFR BLD AUTO: 70 % (ref 31–73)
PLATELET # BLD AUTO: 340 X10^3/UL (ref 140–400)
POTASSIUM SERPL-SCNC: 4.2 MMOL/L (ref 3.5–5.1)
RBC # BLD AUTO: 4.7 X10^6/UL (ref 4.3–5.7)
SODIUM SERPL-SCNC: 139 MMOL/L (ref 136–145)
WBC # BLD AUTO: 12.4 X10^3/UL (ref 4–11)

## 2021-04-17 RX ADMIN — MONTELUKAST SODIUM SCH MG: 10 TABLET, FILM COATED ORAL at 21:07

## 2021-04-17 RX ADMIN — INSULIN GLARGINE SCH UNIT: 100 INJECTION, SOLUTION SUBCUTANEOUS at 21:13

## 2021-04-17 RX ADMIN — ENOXAPARIN SODIUM SCH MG: 100 INJECTION SUBCUTANEOUS at 21:07

## 2021-04-17 RX ADMIN — HYDROCODONE BITARTRATE AND ACETAMINOPHEN PRN TAB: 5; 325 TABLET ORAL at 17:58

## 2021-04-17 RX ADMIN — HYDROCODONE BITARTRATE AND ACETAMINOPHEN PRN TAB: 5; 325 TABLET ORAL at 13:09

## 2021-04-17 RX ADMIN — INSULIN LISPRO SCH UNITS: 100 INJECTION, SOLUTION INTRAVENOUS; SUBCUTANEOUS at 17:13

## 2021-04-17 RX ADMIN — HYDROCODONE BITARTRATE AND ACETAMINOPHEN PRN TAB: 5; 325 TABLET ORAL at 00:50

## 2021-04-17 RX ADMIN — CEFTRIAXONE SODIUM SCH GM: 2 INJECTION, POWDER, FOR SOLUTION INTRAMUSCULAR; INTRAVENOUS at 13:00

## 2021-04-17 RX ADMIN — INSULIN LISPRO SCH UNITS: 100 INJECTION, SOLUTION INTRAVENOUS; SUBCUTANEOUS at 17:00

## 2021-04-17 RX ADMIN — ENOXAPARIN SODIUM SCH MG: 100 INJECTION SUBCUTANEOUS at 09:12

## 2021-04-17 RX ADMIN — FLUTICASONE FUROATE AND VILANTEROL TRIFENATATE SCH PUFF: 100; 25 POWDER RESPIRATORY (INHALATION) at 09:00

## 2021-04-17 RX ADMIN — ATORVASTATIN CALCIUM SCH MG: 40 TABLET, FILM COATED ORAL at 21:07

## 2021-04-17 RX ADMIN — FLUTICASONE PROPIONATE SCH SPRAY: 50 SPRAY, METERED NASAL at 09:11

## 2021-04-17 RX ADMIN — INSULIN LISPRO SCH UNITS: 100 INJECTION, SOLUTION INTRAVENOUS; SUBCUTANEOUS at 09:21

## 2021-04-17 RX ADMIN — Medication SCH CAP: at 21:07

## 2021-04-17 RX ADMIN — INSULIN GLARGINE SCH UNIT: 100 INJECTION, SOLUTION SUBCUTANEOUS at 09:22

## 2021-04-17 RX ADMIN — INSULIN LISPRO SCH UNITS: 100 INJECTION, SOLUTION INTRAVENOUS; SUBCUTANEOUS at 12:00

## 2021-04-17 RX ADMIN — Medication SCH CAP: at 09:11

## 2021-04-17 RX ADMIN — HYDROCODONE BITARTRATE AND ACETAMINOPHEN PRN TAB: 5; 325 TABLET ORAL at 09:10

## 2021-04-17 RX ADMIN — LOSARTAN POTASSIUM SCH MG: 50 TABLET ORAL at 09:11

## 2021-04-17 RX ADMIN — INSULIN LISPRO SCH UNITS: 100 INJECTION, SOLUTION INTRAVENOUS; SUBCUTANEOUS at 09:20

## 2021-04-17 RX ADMIN — ASPIRIN 81 MG SCH MG: 81 TABLET ORAL at 09:11

## 2021-04-17 NOTE — PDOC
[facility] PROGRESS NOTE


Date of Service


DOS:


DATE: 4/17/21 


TIME: 10:01





Chief Complaint


Chief Complaint


Sepsis


Acute respiratory failure with hypoxia


Right pyelonephritis


DM2 with hyperglycemia


Moderate malnutrition


COVID-19 PUI





Plan:


Patient received fluids and broad-spectrum antibiotics and the ED.


Will continue treatment of asthma exacerbation and pyelonephritis with Rocephin 

daily and doxycycline IV twice daily.


Consultation to pulmonology acute hypoxia COVID-19 PUI


Treat empirically with Decadron 6 mg daily


COVID-19 and influenza pending


Basal/prandial insulin; A1c pending.


Resume home medications


FEN - Cardiac diet


PPX  - Lovenox


FULL CODE


Dispo - inpatient for above; patient names his girlfriend as his surrogate 

decision-maker.





History of Present Illness


History of Present Illness


Patient is a 40 old male with past medical history asthma, hypertension, DM2, 

presents to the ER with complaints of worsening shortness of breath over the 

past 2 days.  Reports associated nausea, vomiting, diarrhea, body aches, fever, 

chills, and polyuria.  He has been taking his home albuterol inhaler, symptoms 

acutely worsened when he ran out with this medication.  Upon arrival to the ED 

he was tachypnea, febrile, saturating 99% on 5 L nasal cannula.  CT 

chest/abdomen/pelvis obtained on admission showed subtle right perinephric 

stranding; gallstones, no PE or chest abnormality.  Will admit patient for 

further medical management.





4/17/2021


Afebrile.  Feeling well today.  Speaking to me on room air at the time of my 

evaluation.  Continue IV Rocephin.  Anticipate discharge Monday on cefdinir.





4/16/2021


Patient seen and evaluated bedside.  States he feels better, denies fever.  

Denies chest pain or diarrhea.  Discussed with ID, will continue on IV Rocephin 

for now, and likely switch to cefdinir 100 mg p.o. twice daily for 10 days when 

ready to discharge likely on Monday.  At that time will obtain 6-minute walk. 

Still breathing on 2 L nasal cannula; will need 6-minute walk prior to discharge

due to suspected obesity hypoventilation syndrome or BC.





4/15/2021


Patient seen and evaluated bedside.  He is afebrile, currently breathing on 2 L 

nasal cannula.  He is COVID-19 negative.  Urine and blood cultures positive for 

Proteus mirabilis; sensitivities largely pansensitive except resistant to nitro

furantoin and tetracycline..  Will continue treatment of his pyelonephritis with

Zosyn, per ID.  He still complains of epigastric pain pain with deep 

inspiration, and flank pain.  Some right upper quadrant tenderness on exam, he 

does report some pain after eating.  On admission CT did show cholelithiasis.  

Will obtain ultrasound to rule out cholecystitis.  Discussed with RN.





4/14/2021


Afebrile, currently breathing on 2 L nasal cannula.  He denies any sensation of 

shortness of breath.  Discussed with Dr. Yun, will concern for COVID-19 based 

on CT results.  COVID-19 test still pending at this time.  Urine cultures 

positive for Proteus mirabilis; blood cultures positive for Proteus mirabilis as

well.  Continue treatment for pyelonephritis with Zosyn, per ID.  Will follow 

sensitivities.  If COVID-19 positive, will initiate remdesivir.





Vitals/I&O


Vitals/I&O:





                                   Vital Signs








  Date Time  Temp Pulse Resp B/P (MAP) Pulse Ox O2 Delivery O2 Flow Rate FiO2


 


4/17/21 09:11  96  132/87    


 


4/17/21 09:10      Nasal Cannula  


 


4/17/21 07:00 98.2  30  95  3.0 





 98.2       














                                    I & O   


 


 4/16/21 4/16/21 4/17/21





 15:00 23:00 07:00


 


Intake Total 620 ml 760 ml 1000 ml


 


Output Total 825 ml 1200 ml 500 ml


 


Balance -205 ml -440 ml 500 ml











Physical Exam


Physical Exam:


GENERAL:  Alert, oriented x 3, morbidly obese male, nontoxic appearing, lying in


bed comfortably,


HEENT:  Normocephalic, atraumatic.  Anicteric.  No thrush.


NECK:  Fullness present.


LUNGS: Decreased breath sounds bilaterally


HEART:  S1, S2.


ABDOMEN:  Morbidly obese.  Bowel sounds present, no rebound or guarding


EXTREMITIES:  No edema, no cyanosis.


DERMATOLOGIC:  Warm and dry.  No generalized rash.


NEUROLOGIC:  Alert, oriented x 3, grossly nonfocal.


PSYCHIATRIC:  Cooperative, appropriate mood and affect.


General:  Alert


Heart:  Regular rate


Lungs:  Clear


Abdomen:  Soft, Other (Right upper quadrant tenderness)


Extremities:  No clubbing, No cyanosis


Skin:  No rashes





Labs


Labs:





Laboratory Tests








Test


 4/16/21


11:31 4/16/21


16:24 4/16/21


17:21 4/16/21


20:22


 


Glucose (Fingerstick)


 113 mg/dL


(70-99) 65 mg/dL


(70-99) 109 mg/dL


(70-99) 157 mg/dL


(70-99)


 


Test


 4/17/21


04:30 4/17/21


07:33 


 





 


White Blood Count


 12.4 x10^3/uL


(4.0-11.0) 


 


 





 


Red Blood Count


 4.70 x10^6/uL


(4.30-5.70) 


 


 





 


Hemoglobin


 11.6 g/dL


(13.0-17.5) 


 


 





 


Hematocrit


 35.8 %


(39.0-53.0) 


 


 





 


Mean Corpuscular Volume 76 fL ()    


 


Mean Corpuscular Hemoglobin 25 pg (25-35)    


 


Mean Corpuscular Hemoglobin


Concent 32 g/dL


(31-37) 


 


 





 


Red Cell Distribution Width


 15.6 %


(11.5-14.5) 


 


 





 


Platelet Count


 340 x10^3/uL


(140-400) 


 


 





 


Neutrophils (%) (Auto) 70 % (31-73)    


 


Lymphocytes (%) (Auto) 15 % (24-48)    


 


Monocytes (%) (Auto) 14 % (0-9)    


 


Eosinophils (%) (Auto) 2 % (0-3)    


 


Basophils (%) (Auto) 0 % (0-3)    


 


Neutrophils # (Auto)


 8.6 x10^3/uL


(1.8-7.7) 


 


 





 


Lymphocytes # (Auto)


 1.8 x10^3/uL


(1.0-4.8) 


 


 





 


Monocytes # (Auto)


 1.7 x10^3/uL


(0.0-1.1) 


 


 





 


Eosinophils # (Auto)


 0.2 x10^3/uL


(0.0-0.7) 


 


 





 


Basophils # (Auto)


 0.0 x10^3/uL


(0.0-0.2) 


 


 





 


Sodium Level


 139 mmol/L


(136-145) 


 


 





 


Potassium Level


 4.2 mmol/L


(3.5-5.1) 


 


 





 


Chloride Level


 102 mmol/L


() 


 


 





 


Carbon Dioxide Level


 27 mmol/L


(21-32) 


 


 





 


Anion Gap 10 (6-14)    


 


Blood Urea Nitrogen


 15 mg/dL


(8-26) 


 


 





 


Creatinine


 0.9 mg/dL


(0.7-1.3) 


 


 





 


Estimated GFR


(Cockcroft-Gault) 112.0 


 


 


 





 


Glucose Level


 176 mg/dL


(70-99) 


 


 





 


Calcium Level


 7.8 mg/dL


(8.5-10.1) 


 


 





 


Glucose (Fingerstick)


 


 185 mg/dL


(70-99) 


 














Assessment and Plan


Assessmemt and Plan


Problems


Medical Problems:


(1) Hypomagnesemia


Status: Acute  





(2) Pyelonephritis


Status: Acute  





(3) Septic shock


Status: Acute  





(4) Suspected 2019 novel coronavirus infection


Status: Acute  








Goals of Care:


Advance Care Planning: Total time spent face-to-face with patient greater than 

16 minutes in discussion with goals of care, comfort care, end-of-life care, 

pain management, code status





Comment


Review of Relevant


I have reviewed the following items martha (where applicable) has been applied.


Medications:





Current Medications








 Medications


  (Trade)  Dose


 Ordered  Sig/Pratik


 Route


 PRN Reason  Start Time


 Stop Time Status Last Admin


Dose Admin


 


 Ceftriaxone Sodium


  (Rocephin)  2 gm  Q24H


 IVP


   4/16/21 13:00


    4/16/21 13:17





 


 Lactobacillus


 Rhamnosus


  (Culturelle)  1 cap  BID


 PO


   4/16/21 21:00


    4/17/21 09:11














Justifications for Admission


Other Justification


Acute respiratory failure with hypoxia, pyelonephritis, COVID-19 GARY ENRIQUEZ MD            Apr 17, 2021 10:06

## 2021-04-17 NOTE — PDOC
Infectious Disease Note


Subjective:


Subjective


Patient feels better today


Fever has resolved


Right upper quadrant and flank pain are improving


Still remains on 3 L O2 by nasal cannula


Tolerating p.o. intake well





Vital Signs:


Vital Signs





Vital Signs








  Date Time  Temp Pulse Resp B/P (MAP) Pulse Ox O2 Delivery O2 Flow Rate FiO2


 


4/17/21 03:00 98.0 87 24 135/68 (90) 99 Nasal Cannula 3.0 





 98.0       











Physical Exam:


PHYSICAL EXAM


GENERAL:  Alert, oriented x 3, morbidly obese male, nontoxic appearing, lying in


bed comfortably,


HEENT:  Normocephalic, atraumatic.  Anicteric.  No thrush.


NECK:  Fullness present.


LUNGS: Decreased breath sounds bilaterally


HEART:  S1, S2.


ABDOMEN:  Morbidly obese.  Bowel sounds present, no rebound or guarding


EXTREMITIES:  No edema, no cyanosis.


DERMATOLOGIC:  Warm and dry.  No generalized rash.


NEUROLOGIC:  Alert, oriented x 3, grossly nonfocal.


PSYCHIATRIC:  Cooperative, appropriate mood and affect.





Medications:


Inpatient Meds:


Medications reviewed.





Labs:


Lab





Laboratory Tests








Test


 4/16/21


08:02 4/16/21


11:31 4/16/21


16:24 4/16/21


17:21


 


Glucose (Fingerstick)


 176 mg/dL


(70-99) 113 mg/dL


(70-99) 65 mg/dL


(70-99) 109 mg/dL


(70-99)


 


Test


 4/16/21


20:22 4/17/21


04:30 4/17/21


07:33 





 


Glucose (Fingerstick)


 157 mg/dL


(70-99) 


 185 mg/dL


(70-99) 





 


White Blood Count


 


 12.4 x10^3/uL


(4.0-11.0) 


 





 


Red Blood Count


 


 4.70 x10^6/uL


(4.30-5.70) 


 





 


Hemoglobin


 


 11.6 g/dL


(13.0-17.5) 


 





 


Hematocrit


 


 35.8 %


(39.0-53.0) 


 





 


Mean Corpuscular Volume  76 fL ()   


 


Mean Corpuscular Hemoglobin  25 pg (25-35)   


 


Mean Corpuscular Hemoglobin


Concent 


 32 g/dL


(31-37) 


 





 


Red Cell Distribution Width


 


 15.6 %


(11.5-14.5) 


 





 


Platelet Count


 


 340 x10^3/uL


(140-400) 


 





 


Neutrophils (%) (Auto)  70 % (31-73)   


 


Lymphocytes (%) (Auto)  15 % (24-48)   


 


Monocytes (%) (Auto)  14 % (0-9)   


 


Eosinophils (%) (Auto)  2 % (0-3)   


 


Basophils (%) (Auto)  0 % (0-3)   


 


Neutrophils # (Auto)


 


 8.6 x10^3/uL


(1.8-7.7) 


 





 


Lymphocytes # (Auto)


 


 1.8 x10^3/uL


(1.0-4.8) 


 





 


Monocytes # (Auto)


 


 1.7 x10^3/uL


(0.0-1.1) 


 





 


Eosinophils # (Auto)


 


 0.2 x10^3/uL


(0.0-0.7) 


 





 


Basophils # (Auto)


 


 0.0 x10^3/uL


(0.0-0.2) 


 





 


Sodium Level


 


 139 mmol/L


(136-145) 


 





 


Potassium Level


 


 4.2 mmol/L


(3.5-5.1) 


 





 


Chloride Level


 


 102 mmol/L


() 


 





 


Carbon Dioxide Level


 


 27 mmol/L


(21-32) 


 





 


Anion Gap  10 (6-14)   


 


Blood Urea Nitrogen


 


 15 mg/dL


(8-26) 


 





 


Creatinine


 


 0.9 mg/dL


(0.7-1.3) 


 





 


Estimated GFR


(Cockcroft-Gault) 


 112.0 


 


 





 


Glucose Level


 


 176 mg/dL


(70-99) 


 





 


Calcium Level


 


 7.8 mg/dL


(8.5-10.1) 


 














Objective:


Assessment:





1.  Sepsis from gram-negative bacteremia.


2.  Proteus Mirabella's bacteremia.


3.  Fever.


4.  Leukocytosis.


5.  Lactic acidosis.


6.  Proteus mirabilis urinary tract infection.


7.  Nausea.


8.  Morbid obesity.


9.  Diabetes mellitus type 2.


10.  Covid PCR negative


11.  Cholelithiasis





Plan:


Plan of Care


 


Cont Ceftriaxone 


Trend WBC 12 K today


Probiotics


Maintain aspiration precaution


Continue supportive care











NICA MINA MD           Apr 17, 2021 07:50

## 2021-04-17 NOTE — PDOC
PULMONARY PROGRESS NOTES


DATE: 4/17/21 


TIME: 09:02


Subjective


pt. resting on 3 liters NC   has nasal congestion


no SOA, cough or CP


Vitals





Vital Signs








  Date Time  Temp Pulse Resp B/P (MAP) Pulse Ox O2 Delivery O2 Flow Rate FiO2


 


4/17/21 07:00 98.2 96 30 132/87 (102) 95 Nasal Cannula 3.0 





 98.2       








ROS:  No Nausea, No Chest Pain, No Abdominal Pain, No Increase Cough


General:  Alert, Oriented X4, No acute distress


Lungs:  Clear


Cardiovascular:  S1


Abdomen:  Soft, Other (obese)


Neuro Exam:  Alert


Extremities:  No Edema, Other (Some edema)


Labs





Laboratory Tests








Test


 4/15/21


11:44 4/15/21


16:40 4/15/21


21:27 4/16/21


04:00


 


Glucose (Fingerstick)


 149 mg/dL


(70-99) 148 mg/dL


(70-99) 230 mg/dL


(70-99) 





 


White Blood Count


 


 


 


 11.6 x10^3/uL


(4.0-11.0)


 


Red Blood Count


 


 


 


 4.51 x10^6/uL


(4.30-5.70)


 


Hemoglobin


 


 


 


 11.1 g/dL


(13.0-17.5)


 


Hematocrit


 


 


 


 34.7 %


(39.0-53.0)


 


Mean Corpuscular Volume    77 fL () 


 


Mean Corpuscular Hemoglobin    25 pg (25-35) 


 


Mean Corpuscular Hemoglobin


Concent 


 


 


 32 g/dL


(31-37)


 


Red Cell Distribution Width


 


 


 


 15.2 %


(11.5-14.5)


 


Platelet Count


 


 


 


 346 x10^3/uL


(140-400)


 


Neutrophils (%) (Auto)    71 % (31-73) 


 


Lymphocytes (%) (Auto)    13 % (24-48) 


 


Monocytes (%) (Auto)    15 % (0-9) 


 


Eosinophils (%) (Auto)    1 % (0-3) 


 


Basophils (%) (Auto)    0 % (0-3) 


 


Neutrophils # (Auto)


 


 


 


 8.3 x10^3/uL


(1.8-7.7)


 


Lymphocytes # (Auto)


 


 


 


 1.5 x10^3/uL


(1.0-4.8)


 


Monocytes # (Auto)


 


 


 


 1.7 x10^3/uL


(0.0-1.1)


 


Eosinophils # (Auto)


 


 


 


 0.1 x10^3/uL


(0.0-0.7)


 


Basophils # (Auto)


 


 


 


 0.0 x10^3/uL


(0.0-0.2)


 


Sodium Level


 


 


 


 138 mmol/L


(136-145)


 


Potassium Level


 


 


 


 4.0 mmol/L


(3.5-5.1)


 


Chloride Level


 


 


 


 102 mmol/L


()


 


Carbon Dioxide Level


 


 


 


 28 mmol/L


(21-32)


 


Anion Gap    8 (6-14) 


 


Blood Urea Nitrogen


 


 


 


 13 mg/dL


(8-26)


 


Creatinine


 


 


 


 0.9 mg/dL


(0.7-1.3)


 


Estimated GFR


(Cockcroft-Gault) 


 


 


 112.0 





 


Glucose Level


 


 


 


 176 mg/dL


(70-99)


 


Calcium Level


 


 


 


 7.5 mg/dL


(8.5-10.1)


 


Test


 4/16/21


08:02 4/16/21


11:31 4/16/21


16:24 4/16/21


17:21


 


Glucose (Fingerstick)


 176 mg/dL


(70-99) 113 mg/dL


(70-99) 65 mg/dL


(70-99) 109 mg/dL


(70-99)


 


Test


 4/16/21


20:22 4/17/21


04:30 4/17/21


07:33 





 


Glucose (Fingerstick)


 157 mg/dL


(70-99) 


 185 mg/dL


(70-99) 





 


White Blood Count


 


 12.4 x10^3/uL


(4.0-11.0) 


 





 


Red Blood Count


 


 4.70 x10^6/uL


(4.30-5.70) 


 





 


Hemoglobin


 


 11.6 g/dL


(13.0-17.5) 


 





 


Hematocrit


 


 35.8 %


(39.0-53.0) 


 





 


Mean Corpuscular Volume  76 fL ()   


 


Mean Corpuscular Hemoglobin  25 pg (25-35)   


 


Mean Corpuscular Hemoglobin


Concent 


 32 g/dL


(31-37) 


 





 


Red Cell Distribution Width


 


 15.6 %


(11.5-14.5) 


 





 


Platelet Count


 


 340 x10^3/uL


(140-400) 


 





 


Neutrophils (%) (Auto)  70 % (31-73)   


 


Lymphocytes (%) (Auto)  15 % (24-48)   


 


Monocytes (%) (Auto)  14 % (0-9)   


 


Eosinophils (%) (Auto)  2 % (0-3)   


 


Basophils (%) (Auto)  0 % (0-3)   


 


Neutrophils # (Auto)


 


 8.6 x10^3/uL


(1.8-7.7) 


 





 


Lymphocytes # (Auto)


 


 1.8 x10^3/uL


(1.0-4.8) 


 





 


Monocytes # (Auto)


 


 1.7 x10^3/uL


(0.0-1.1) 


 





 


Eosinophils # (Auto)


 


 0.2 x10^3/uL


(0.0-0.7) 


 





 


Basophils # (Auto)


 


 0.0 x10^3/uL


(0.0-0.2) 


 





 


Sodium Level


 


 139 mmol/L


(136-145) 


 





 


Potassium Level


 


 4.2 mmol/L


(3.5-5.1) 


 





 


Chloride Level


 


 102 mmol/L


() 


 





 


Carbon Dioxide Level


 


 27 mmol/L


(21-32) 


 





 


Anion Gap  10 (6-14)   


 


Blood Urea Nitrogen


 


 15 mg/dL


(8-26) 


 





 


Creatinine


 


 0.9 mg/dL


(0.7-1.3) 


 





 


Estimated GFR


(Cockcroft-Gault) 


 112.0 


 


 





 


Glucose Level


 


 176 mg/dL


(70-99) 


 





 


Calcium Level


 


 7.8 mg/dL


(8.5-10.1) 


 











Laboratory Tests








Test


 4/16/21


11:31 4/16/21


16:24 4/16/21


17:21 4/16/21


20:22


 


Glucose (Fingerstick)


 113 mg/dL


(70-99) 65 mg/dL


(70-99) 109 mg/dL


(70-99) 157 mg/dL


(70-99)


 


Test


 4/17/21


04:30 4/17/21


07:33 


 





 


White Blood Count


 12.4 x10^3/uL


(4.0-11.0) 


 


 





 


Red Blood Count


 4.70 x10^6/uL


(4.30-5.70) 


 


 





 


Hemoglobin


 11.6 g/dL


(13.0-17.5) 


 


 





 


Hematocrit


 35.8 %


(39.0-53.0) 


 


 





 


Mean Corpuscular Volume 76 fL ()    


 


Mean Corpuscular Hemoglobin 25 pg (25-35)    


 


Mean Corpuscular Hemoglobin


Concent 32 g/dL


(31-37) 


 


 





 


Red Cell Distribution Width


 15.6 %


(11.5-14.5) 


 


 





 


Platelet Count


 340 x10^3/uL


(140-400) 


 


 





 


Neutrophils (%) (Auto) 70 % (31-73)    


 


Lymphocytes (%) (Auto) 15 % (24-48)    


 


Monocytes (%) (Auto) 14 % (0-9)    


 


Eosinophils (%) (Auto) 2 % (0-3)    


 


Basophils (%) (Auto) 0 % (0-3)    


 


Neutrophils # (Auto)


 8.6 x10^3/uL


(1.8-7.7) 


 


 





 


Lymphocytes # (Auto)


 1.8 x10^3/uL


(1.0-4.8) 


 


 





 


Monocytes # (Auto)


 1.7 x10^3/uL


(0.0-1.1) 


 


 





 


Eosinophils # (Auto)


 0.2 x10^3/uL


(0.0-0.7) 


 


 





 


Basophils # (Auto)


 0.0 x10^3/uL


(0.0-0.2) 


 


 





 


Sodium Level


 139 mmol/L


(136-145) 


 


 





 


Potassium Level


 4.2 mmol/L


(3.5-5.1) 


 


 





 


Chloride Level


 102 mmol/L


() 


 


 





 


Carbon Dioxide Level


 27 mmol/L


(21-32) 


 


 





 


Anion Gap 10 (6-14)    


 


Blood Urea Nitrogen


 15 mg/dL


(8-26) 


 


 





 


Creatinine


 0.9 mg/dL


(0.7-1.3) 


 


 





 


Estimated GFR


(Cockcroft-Gault) 112.0 


 


 


 





 


Glucose Level


 176 mg/dL


(70-99) 


 


 





 


Calcium Level


 7.8 mg/dL


(8.5-10.1) 


 


 





 


Glucose (Fingerstick)


 


 185 mg/dL


(70-99) 


 











Medications





Active Scripts








 Medications  Dose


 Route/Sig


 Max Daily Dose Days Date Category


 


 Novolog (Insulin


 Aspart) 100


 Unit/1 Ml


 Cartridge  30 Unit


 SQ TIDWMEALS


   4/13/21 Reported


 


 Norco 5-325


 Tablet


  (Acetaminophen/Hydrocodone


 Bitart) 1 Each


 Tablet  1 Tab


 PO PRN Q6HRS PRN


  5 1/5/21 Rx


 


 Pantoprazole


 Sodium  **


  (Pantoprazole


 Sodium) 40 Mg


 Tablet.dr  40 Mg


 PO DAILYAC


  30 1/5/21 Rx


 


 Montelukast


 Sodium Tablet  **


  (Montelukast


 Sodium) 10 Mg


 Tablet  10 Mg


 PO QHS


  30 1/5/21 Rx


 


 Cozaar **


  (Losartan


 Potassium) 50 Mg


 Tablet  50 Mg


 PO DAILY


  30 1/5/21 Rx


 


 Combivent


 Respimat Inhal


  (Ipratropium/Albuterol


 Sulfate) 4 Gm


 Aer.w.adap  2 Inh


 IH QID


  30 1/5/21 Rx


 


 Prednisone 20 Mg


 Tablet  1 Tab


 PO DAILY


  5 1/5/21 Rx


 


 Doxycycline


 Hyclate 100 Mg


 Tablet  100 Mg


 PO BID


  7 1/5/21 Rx


 


 Sertraline Hcl


 100 Mg Tablet  100 Mg


 PO DAILY


  30 1/5/21 Rx


 


 Trazodone Hcl 100


 Mg Tablet  100 Mg


 PO PRN QHS PRN


  30 1/5/21 Rx


 


 Lantus Solostar


  (Insulin


 Glargine,Hum.rec.anlog)


 100 Unit/1 Ml


 Insuln.pen  30 Unit


 SQ BID


  30 1/5/21 Rx


 


 Atorvastatin


 Calcium 40 Mg


 Tablet  40 Mg


 PO QHS


   3/20/19 Reported


 


 Fluticasone


 Propionate Nasal


 Spray


  (Fluticasone


 Propionate) 16 Gm


 Spray.susp  2 Spr


 ASIM DAILY


   3/20/19 Reported


 


 Aspirin 81 Mg


 Tab.chew  1 Tab


 PO DAILY


   1/25/16 Reported











Impression


.


IMPRESSION:


1.  Acute hypoxic respiratory failure with high-grade fever.  No definite


infiltrates seen on the CT chest.  


2.  No significant tobacco history.


3.  Morbid obesity with underlying sleep apnea.  He is awaiting CPAP,  details


of his sleep study not available.


4.  Influenza screen negative. 


5.  Leukocytosis.


6.  SARS-CoV-2 negative


7.  UTI, per PCP


8. Sepsis from gram-negative bacteremia.


9. Proteus Mirabella's bacteremia.











--------------------------------------

------------------------------------------------------





  URINE CULTURE  Final  


        Final





        GREATER THAN 100,000 CFU/ML GRAM NEGATIVE RODS on 04/14/21


        at 0821


        FINAL ID= [PROTEUS MIRABILIS]


                           Testing Performed by:


                         [address] [address]


          For Inquires, the Physician may contact the Microbiology


                        department at [phone]


        PROTEUS MIRABILIS





Plan


.


Updated 4/17


titrate oxygen to keep sats 90%,  6 min walk at [facility]


SARS-CoV-2 negative


Continue ABX per ID


Educated on importance of weight loss 


Pt. would benefit from out patient sleep study   [place] the importance of diagnosis

tx discussed


advised to lose wt


DVT/GI PPX 


D/W RN 








Updated 4/15


SARS-CoV-2 negative, continue current support


Discontinue steroids


DVT GI prophylaxis


Antibiotics per ID


DVT GI prophylax








4/14


1.  Continue present oxygen to keep saturation 92 and above.


2.  Follow final BC/ Urine has Proteus


3.  Rule out COVID-19.


4.  Continue broad-spectrum antibiotics.


5.  Lovenox for DVT prophylaxis.


6.  The patient was initiated on dexamethasone.  If COVID tests come back


negative, then we will discontinue steroids. 


 Discussed with PCP











MAYNOR RUSHING MD               Apr 17, 2021 09:04

## 2021-04-18 VITALS — DIASTOLIC BLOOD PRESSURE: 82 MMHG | SYSTOLIC BLOOD PRESSURE: 142 MMHG

## 2021-04-18 VITALS — SYSTOLIC BLOOD PRESSURE: 173 MMHG | DIASTOLIC BLOOD PRESSURE: 99 MMHG

## 2021-04-18 VITALS — DIASTOLIC BLOOD PRESSURE: 73 MMHG | SYSTOLIC BLOOD PRESSURE: 129 MMHG

## 2021-04-18 VITALS — SYSTOLIC BLOOD PRESSURE: 155 MMHG | DIASTOLIC BLOOD PRESSURE: 72 MMHG

## 2021-04-18 VITALS — DIASTOLIC BLOOD PRESSURE: 78 MMHG | SYSTOLIC BLOOD PRESSURE: 166 MMHG

## 2021-04-18 VITALS — DIASTOLIC BLOOD PRESSURE: 85 MMHG | SYSTOLIC BLOOD PRESSURE: 155 MMHG

## 2021-04-18 LAB
ANION GAP SERPL CALC-SCNC: 11 MMOL/L (ref 6–14)
BASOPHILS # BLD AUTO: 0 X10^3/UL (ref 0–0.2)
BASOPHILS NFR BLD: 0 % (ref 0–3)
BUN SERPL-MCNC: 14 MG/DL (ref 8–26)
CALCIUM SERPL-MCNC: 7.9 MG/DL (ref 8.5–10.1)
CHLORIDE SERPL-SCNC: 101 MMOL/L (ref 98–107)
CO2 SERPL-SCNC: 28 MMOL/L (ref 21–32)
CREAT SERPL-MCNC: 0.9 MG/DL (ref 0.7–1.3)
EOSINOPHIL NFR BLD: 0.2 X10^3/UL (ref 0–0.7)
EOSINOPHIL NFR BLD: 2 % (ref 0–3)
ERYTHROCYTE [DISTWIDTH] IN BLOOD BY AUTOMATED COUNT: 15.1 % (ref 11.5–14.5)
GFR SERPLBLD BASED ON 1.73 SQ M-ARVRAT: 112 ML/MIN
GLUCOSE SERPL-MCNC: 125 MG/DL (ref 70–99)
HCT VFR BLD CALC: 36.3 % (ref 39–53)
HGB BLD-MCNC: 11.8 G/DL (ref 13–17.5)
LYMPHOCYTES # BLD: 1.8 X10^3/UL (ref 1–4.8)
LYMPHOCYTES NFR BLD AUTO: 14 % (ref 24–48)
MCH RBC QN AUTO: 25 PG (ref 25–35)
MCHC RBC AUTO-ENTMCNC: 33 G/DL (ref 31–37)
MCV RBC AUTO: 76 FL (ref 79–100)
MONO #: 1.2 X10^3/UL (ref 0–1.1)
MONOCYTES NFR BLD: 9 % (ref 0–9)
NEUT #: 9.6 X10^3/UL (ref 1.8–7.7)
NEUTROPHILS NFR BLD AUTO: 75 % (ref 31–73)
PLATELET # BLD AUTO: 379 X10^3/UL (ref 140–400)
POTASSIUM SERPL-SCNC: 4.2 MMOL/L (ref 3.5–5.1)
RBC # BLD AUTO: 4.77 X10^6/UL (ref 4.3–5.7)
SODIUM SERPL-SCNC: 140 MMOL/L (ref 136–145)
WBC # BLD AUTO: 12.9 X10^3/UL (ref 4–11)

## 2021-04-18 RX ADMIN — INSULIN GLARGINE SCH UNIT: 100 INJECTION, SOLUTION SUBCUTANEOUS at 21:15

## 2021-04-18 RX ADMIN — INSULIN LISPRO SCH UNITS: 100 INJECTION, SOLUTION INTRAVENOUS; SUBCUTANEOUS at 12:00

## 2021-04-18 RX ADMIN — ASPIRIN 81 MG SCH MG: 81 TABLET ORAL at 08:41

## 2021-04-18 RX ADMIN — INSULIN LISPRO SCH UNITS: 100 INJECTION, SOLUTION INTRAVENOUS; SUBCUTANEOUS at 11:58

## 2021-04-18 RX ADMIN — AMOXICILLIN AND CLAVULANATE POTASSIUM SCH TAB: 875; 125 TABLET, FILM COATED ORAL at 21:11

## 2021-04-18 RX ADMIN — ENOXAPARIN SODIUM SCH MG: 100 INJECTION SUBCUTANEOUS at 21:11

## 2021-04-18 RX ADMIN — HYDROCODONE BITARTRATE AND ACETAMINOPHEN PRN TAB: 5; 325 TABLET ORAL at 08:42

## 2021-04-18 RX ADMIN — HYDROCODONE BITARTRATE AND ACETAMINOPHEN PRN TAB: 5; 325 TABLET ORAL at 01:26

## 2021-04-18 RX ADMIN — INSULIN LISPRO SCH UNITS: 100 INJECTION, SOLUTION INTRAVENOUS; SUBCUTANEOUS at 17:24

## 2021-04-18 RX ADMIN — ENOXAPARIN SODIUM SCH MG: 100 INJECTION SUBCUTANEOUS at 08:42

## 2021-04-18 RX ADMIN — HYDROCODONE BITARTRATE AND ACETAMINOPHEN PRN TAB: 5; 325 TABLET ORAL at 17:25

## 2021-04-18 RX ADMIN — Medication SCH CAP: at 21:11

## 2021-04-18 RX ADMIN — LOSARTAN POTASSIUM SCH MG: 50 TABLET ORAL at 08:41

## 2021-04-18 RX ADMIN — ATORVASTATIN CALCIUM SCH MG: 40 TABLET, FILM COATED ORAL at 21:11

## 2021-04-18 RX ADMIN — Medication SCH CAP: at 08:41

## 2021-04-18 RX ADMIN — AMOXICILLIN AND CLAVULANATE POTASSIUM SCH TAB: 875; 125 TABLET, FILM COATED ORAL at 08:45

## 2021-04-18 RX ADMIN — MONTELUKAST SODIUM SCH MG: 10 TABLET, FILM COATED ORAL at 21:11

## 2021-04-18 RX ADMIN — FLUTICASONE FUROATE AND VILANTEROL TRIFENATATE SCH PUFF: 100; 25 POWDER RESPIRATORY (INHALATION) at 08:43

## 2021-04-18 RX ADMIN — INSULIN GLARGINE SCH UNIT: 100 INJECTION, SOLUTION SUBCUTANEOUS at 08:54

## 2021-04-18 RX ADMIN — INSULIN LISPRO SCH UNITS: 100 INJECTION, SOLUTION INTRAVENOUS; SUBCUTANEOUS at 08:00

## 2021-04-18 RX ADMIN — FLUTICASONE PROPIONATE SCH SPRAY: 50 SPRAY, METERED NASAL at 08:42

## 2021-04-18 RX ADMIN — INSULIN LISPRO SCH UNITS: 100 INJECTION, SOLUTION INTRAVENOUS; SUBCUTANEOUS at 17:00

## 2021-04-18 NOTE — PDOC
PULMONARY PROGRESS NOTES


DATE: 4/18/21 


TIME: 10:14


Subjective


pt. resting on 3 liters NC    nasal congestion better  is tired


no SOA, cough or CP


Vitals





Vital Signs








  Date Time  Temp Pulse Resp B/P (MAP) Pulse Ox O2 Delivery O2 Flow Rate FiO2


 


4/18/21 08:42      Nasal Cannula 3.0 


 


4/18/21 08:41  73  129/73    


 


4/18/21 07:00 97.7  20  97   





 97.7       








ROS:  No Nausea, No Chest Pain, No Abdominal Pain, No Increase Cough


General:  Alert, Oriented X4, No acute distress


Lungs:  Clear


Cardiovascular:  S1


Abdomen:  Soft, Other (obese)


Neuro Exam:  Alert


Extremities:  No Edema, Other (Some edema)


Labs





Laboratory Tests








Test


 4/16/21


11:31 4/16/21


16:24 4/16/21


17:21 4/16/21


20:22


 


Glucose (Fingerstick)


 113 mg/dL


(70-99) 65 mg/dL


(70-99) 109 mg/dL


(70-99) 157 mg/dL


(70-99)


 


Test


 4/17/21


04:30 4/17/21


07:33 4/17/21


12:04 4/17/21


16:55


 


White Blood Count


 12.4 x10^3/uL


(4.0-11.0) 


 


 





 


Red Blood Count


 4.70 x10^6/uL


(4.30-5.70) 


 


 





 


Hemoglobin


 11.6 g/dL


(13.0-17.5) 


 


 





 


Hematocrit


 35.8 %


(39.0-53.0) 


 


 





 


Mean Corpuscular Volume 76 fL ()    


 


Mean Corpuscular Hemoglobin 25 pg (25-35)    


 


Mean Corpuscular Hemoglobin


Concent 32 g/dL


(31-37) 


 


 





 


Red Cell Distribution Width


 15.6 %


(11.5-14.5) 


 


 





 


Platelet Count


 340 x10^3/uL


(140-400) 


 


 





 


Neutrophils (%) (Auto) 70 % (31-73)    


 


Lymphocytes (%) (Auto) 15 % (24-48)    


 


Monocytes (%) (Auto) 14 % (0-9)    


 


Eosinophils (%) (Auto) 2 % (0-3)    


 


Basophils (%) (Auto) 0 % (0-3)    


 


Neutrophils # (Auto)


 8.6 x10^3/uL


(1.8-7.7) 


 


 





 


Lymphocytes # (Auto)


 1.8 x10^3/uL


(1.0-4.8) 


 


 





 


Monocytes # (Auto)


 1.7 x10^3/uL


(0.0-1.1) 


 


 





 


Eosinophils # (Auto)


 0.2 x10^3/uL


(0.0-0.7) 


 


 





 


Basophils # (Auto)


 0.0 x10^3/uL


(0.0-0.2) 


 


 





 


Sodium Level


 139 mmol/L


(136-145) 


 


 





 


Potassium Level


 4.2 mmol/L


(3.5-5.1) 


 


 





 


Chloride Level


 102 mmol/L


() 


 


 





 


Carbon Dioxide Level


 27 mmol/L


(21-32) 


 


 





 


Anion Gap 10 (6-14)    


 


Blood Urea Nitrogen


 15 mg/dL


(8-26) 


 


 





 


Creatinine


 0.9 mg/dL


(0.7-1.3) 


 


 





 


Estimated GFR


(Cockcroft-Gault) 112.0 


 


 


 





 


Glucose Level


 176 mg/dL


(70-99) 


 


 





 


Calcium Level


 7.8 mg/dL


(8.5-10.1) 


 


 





 


Glucose (Fingerstick)


 


 185 mg/dL


(70-99) 78 mg/dL


(70-99) 124 mg/dL


(70-99)


 


Test


 4/17/21


19:51 4/18/21


03:00 4/18/21


07:34 





 


Glucose (Fingerstick)


 190 mg/dL


(70-99) 


 116 mg/dL


(70-99) 





 


White Blood Count


 


 12.9 x10^3/uL


(4.0-11.0) 


 





 


Red Blood Count


 


 4.77 x10^6/uL


(4.30-5.70) 


 





 


Hemoglobin


 


 11.8 g/dL


(13.0-17.5) 


 





 


Hematocrit


 


 36.3 %


(39.0-53.0) 


 





 


Mean Corpuscular Volume  76 fL ()   


 


Mean Corpuscular Hemoglobin  25 pg (25-35)   


 


Mean Corpuscular Hemoglobin


Concent 


 33 g/dL


(31-37) 


 





 


Red Cell Distribution Width


 


 15.1 %


(11.5-14.5) 


 





 


Platelet Count


 


 379 x10^3/uL


(140-400) 


 





 


Neutrophils (%) (Auto)  75 % (31-73)   


 


Lymphocytes (%) (Auto)  14 % (24-48)   


 


Monocytes (%) (Auto)  9 % (0-9)   


 


Eosinophils (%) (Auto)  2 % (0-3)   


 


Basophils (%) (Auto)  0 % (0-3)   


 


Neutrophils # (Auto)


 


 9.6 x10^3/uL


(1.8-7.7) 


 





 


Lymphocytes # (Auto)


 


 1.8 x10^3/uL


(1.0-4.8) 


 





 


Monocytes # (Auto)


 


 1.2 x10^3/uL


(0.0-1.1) 


 





 


Eosinophils # (Auto)


 


 0.2 x10^3/uL


(0.0-0.7) 


 





 


Basophils # (Auto)


 


 0.0 x10^3/uL


(0.0-0.2) 


 





 


Sodium Level


 


 140 mmol/L


(136-145) 


 





 


Potassium Level


 


 4.2 mmol/L


(3.5-5.1) 


 





 


Chloride Level


 


 101 mmol/L


() 


 





 


Carbon Dioxide Level


 


 28 mmol/L


(21-32) 


 





 


Anion Gap  11 (6-14)   


 


Blood Urea Nitrogen


 


 14 mg/dL


(8-26) 


 





 


Creatinine


 


 0.9 mg/dL


(0.7-1.3) 


 





 


Estimated GFR


(Cockcroft-Gault) 


 112.0 


 


 





 


Glucose Level


 


 125 mg/dL


(70-99) 


 





 


Calcium Level


 


 7.9 mg/dL


(8.5-10.1) 


 











Laboratory Tests








Test


 4/17/21


12:04 4/17/21


16:55 4/17/21


19:51 4/18/21


03:00


 


Glucose (Fingerstick)


 78 mg/dL


(70-99) 124 mg/dL


(70-99) 190 mg/dL


(70-99) 





 


White Blood Count


 


 


 


 12.9 x10^3/uL


(4.0-11.0)


 


Red Blood Count


 


 


 


 4.77 x10^6/uL


(4.30-5.70)


 


Hemoglobin


 


 


 


 11.8 g/dL


(13.0-17.5)


 


Hematocrit


 


 


 


 36.3 %


(39.0-53.0)


 


Mean Corpuscular Volume    76 fL () 


 


Mean Corpuscular Hemoglobin    25 pg (25-35) 


 


Mean Corpuscular Hemoglobin


Concent 


 


 


 33 g/dL


(31-37)


 


Red Cell Distribution Width


 


 


 


 15.1 %


(11.5-14.5)


 


Platelet Count


 


 


 


 379 x10^3/uL


(140-400)


 


Neutrophils (%) (Auto)    75 % (31-73) 


 


Lymphocytes (%) (Auto)    14 % (24-48) 


 


Monocytes (%) (Auto)    9 % (0-9) 


 


Eosinophils (%) (Auto)    2 % (0-3) 


 


Basophils (%) (Auto)    0 % (0-3) 


 


Neutrophils # (Auto)


 


 


 


 9.6 x10^3/uL


(1.8-7.7)


 


Lymphocytes # (Auto)


 


 


 


 1.8 x10^3/uL


(1.0-4.8)


 


Monocytes # (Auto)


 


 


 


 1.2 x10^3/uL


(0.0-1.1)


 


Eosinophils # (Auto)


 


 


 


 0.2 x10^3/uL


(0.0-0.7)


 


Basophils # (Auto)


 


 


 


 0.0 x10^3/uL


(0.0-0.2)


 


Sodium Level


 


 


 


 140 mmol/L


(136-145)


 


Potassium Level


 


 


 


 4.2 mmol/L


(3.5-5.1)


 


Chloride Level


 


 


 


 101 mmol/L


()


 


Carbon Dioxide Level


 


 


 


 28 mmol/L


(21-32)


 


Anion Gap    11 (6-14) 


 


Blood Urea Nitrogen


 


 


 


 14 mg/dL


(8-26)


 


Creatinine


 


 


 


 0.9 mg/dL


(0.7-1.3)


 


Estimated GFR


(Cockcroft-Gault) 


 


 


 112.0 





 


Glucose Level


 


 


 


 125 mg/dL


(70-99)


 


Calcium Level


 


 


 


 7.9 mg/dL


(8.5-10.1)


 


Test


 4/18/21


07:34 


 


 





 


Glucose (Fingerstick)


 116 mg/dL


(70-99) 


 


 











Medications





Active Scripts








 Medications  Dose


 Route/Sig


 Max Daily Dose Days Date Category


 


 Novolog (Insulin


 Aspart) 100


 Unit/1 Ml


 Cartridge  30 Unit


 SQ TIDWMEALS


   4/13/21 Reported


 


 Norco 5-325


 Tablet


  (Acetaminophen/Hydrocodone


 Bitart) 1 Each


 Tablet  1 Tab


 PO PRN Q6HRS PRN


  5 1/5/21 Rx


 


 Pantoprazole


 Sodium  **


  (Pantoprazole


 Sodium) 40 Mg


 Tablet.dr  40 Mg


 PO DAILYAC


  30 1/5/21 Rx


 


 Montelukast


 Sodium Tablet  **


  (Montelukast


 Sodium) 10 Mg


 Tablet  10 Mg


 PO QHS


  30 1/5/21 Rx


 


 Cozaar **


  (Losartan


 Potassium) 50 Mg


 Tablet  50 Mg


 PO DAILY


  30 1/5/21 Rx


 


 Combivent


 Respimat Inhal


  (Ipratropium/Albuterol


 Sulfate) 4 Gm


 Aer.w.adap  2 Inh


 IH QID


  30 1/5/21 Rx


 


 Prednisone 20 Mg


 Tablet  1 Tab


 PO DAILY


  5 1/5/21 Rx


 


 Doxycycline


 Hyclate 100 Mg


 Tablet  100 Mg


 PO BID


  7 1/5/21 Rx


 


 Sertraline Hcl


 100 Mg Tablet  100 Mg


 PO DAILY


  30 1/5/21 Rx


 


 Trazodone Hcl 100


 Mg Tablet  100 Mg


 PO PRN QHS PRN


  30 1/5/21 Rx


 


 Lantus Solostar


  (Insulin


 Glargine,Hum.rec.anlog)


 100 Unit/1 Ml


 Insuln.pen  30 Unit


 SQ BID


  30 1/5/21 Rx


 


 Atorvastatin


 Calcium 40 Mg


 Tablet  40 Mg


 PO QHS


   3/20/19 Reported


 


 Fluticasone


 Propionate Nasal


 Spray


  (Fluticasone


 Propionate) 16 Gm


 Spray.susp  2 Spr


 ASIM DAILY


   3/20/19 Reported


 


 Aspirin 81 Mg


 Tab.chew  1 Tab


 PO DAILY


   1/25/16 Reported











Impression


.


IMPRESSION:


1.  Acute hypoxic respiratory failure with high-grade fever.  No definite


infiltrates seen on the CT chest.  


2.  No significant tobacco history.


3.  Morbid obesity with underlying sleep apnea.  He is awaiting CPAP,  details


of his sleep study not available.


4.  Influenza screen negative. 


5.  Leukocytosis.


6.  SARS-CoV-2 negative


7.  UTI, per PCP


8. Sepsis from gram-negative bacteremia.


9. Proteus Mirabella's bacteremia.











------------

--------------------------------------------------------------------------------





  URINE CULTURE  Final  


        Final





        GREATER THAN 100,000 CFU/ML GRAM NEGATIVE RODS on 04/14/21


        at 0821


        FINAL ID= [PROTEUS MIRABILIS]


                           Testing Performed by:


                         [address] [address]


          For Inquires, the Physician may contact the Microbiology


                        department at [phone]


        PROTEUS MIRABILIS





Plan


.


Updated 4/18


titrate oxygen to keep sats 90%,  6 min walk at [facility]


SARS-CoV-2 negative


Continue ABX per ID changed to po


Educated on importance of weight loss 


Pt. would benefit from out patient sleep study   [place] the importance of diagnosis

tx discussed


advised to lose wt and exercise


DVT/GI PPX 


D/W RN 








Updated 4/15


SARS-CoV-2 negative, continue current support


Discontinue steroids


DVT GI prophylaxis


Antibiotics per ID


DVT GI prophylax








4/14


1.  Continue present oxygen to keep saturation 92 and above.


2.  Follow final BC/ Urine has Proteus


3.  Rule out COVID-19.


4.  Continue broad-spectrum antibiotics.


5.  Lovenox for DVT prophylaxis.


6.  The patient was initiated on dexamethasone.  If COVID tests come back


negative, then we will discontinue steroids. 


 Discussed with PCP











MAYNOR RUSHING MD               Apr 18, 2021 10:15

## 2021-04-18 NOTE — PDOC
[facility] PROGRESS NOTE


Date of Service


DOS:


DATE: 4/18/21 


TIME: 11:57





Chief Complaint


Chief Complaint


Sepsis


Acute respiratory failure with hypoxia


Right pyelonephritis


DM2 with hyperglycemia


Moderate malnutrition


COVID-19 PUI





Plan:


Patient received fluids and broad-spectrum antibiotics and the ED.


Will continue treatment of asthma exacerbation and pyelonephritis with Rocephin 

daily and doxycycline IV twice daily.


Consultation to pulmonology acute hypoxia COVID-19 PUI


Treat empirically with Decadron 6 mg daily


COVID-19 and influenza pending


Basal/prandial insulin; A1c pending.


Resume home medications


FEN - Cardiac diet


PPX  - Lovenox


FULL CODE


Dispo - inpatient for above; patient names his girlfriend as his surrogate 

decision-maker.





History of Present Illness


History of Present Illness


Patient is a 40 old male with past medical history asthma, hypertension, DM2, 

presents to the ER with complaints of worsening shortness of breath over the 

past 2 days.  Reports associated nausea, vomiting, diarrhea, body aches, fever, 

chills, and polyuria.  He has been taking his home albuterol inhaler, symptoms 

acutely worsened when he ran out with this medication.  Upon arrival to the ED 

he was tachypnea, febrile, saturating 99% on 5 L nasal cannula.  CT 

chest/abdomen/pelvis obtained on admission showed subtle right perinephric 

stranding; gallstones, no PE or chest abnormality.  Will admit patient for 

further medical management.





4/18/2021


Afebrile, some hypertension this morning.  Breathing on 3 L nasal cannula; 

apparently is waiting for home CPAP machine.  Will obtain 6-minute walk prior to

discharge tomorrow.  Will discharge tomorrow on Augmentin twice daily to 

complete a 2-week course.





4/17/2021


Afebrile.  Feeling well today.  Speaking to me on room air at the time of my 

evaluation.  Continue IV Rocephin.  Anticipate discharge Monday on cefdinir.





4/16/2021


Patient seen and evaluated bedside.  States he feels better, denies fever.  

Denies chest pain or diarrhea.  Discussed with ID, will continue on IV Rocephin 

for now, and likely switch to cefdinir 100 mg p.o. twice daily for 10 days when 

ready to discharge likely on Monday.  At that time will obtain 6-minute walk. 

Still breathing on 2 L nasal cannula; will need 6-minute walk prior to discharge

due to suspected obesity hypoventilation syndrome or BC.





4/15/2021


Patient seen and evaluated bedside.  He is afebrile, currently breathing on 2 L 

nasal cannula.  He is COVID-19 negative.  Urine and blood cultures positive for 

Proteus mirabilis; sensitivities largely pansensitive except resistant to 

nitrofurantoin and tetracycline..  Will continue treatment of his pyelonephritis

with Zosyn, per ID.  He still complains of epigastric pain pain with deep 

inspiration, and flank pain.  Some right upper quadrant tenderness on exam, he 

does report some pain after eating.  On admission CT did show cholelithiasis.  

Will obtain ultrasound to rule out cholecystitis.  Discussed with RN.





4/14/2021


Afebrile, currently breathing on 2 L nasal cannula.  He denies any sensation of 

shortness of breath.  Discussed with Dr. Yun, will concern for COVID-19 based 

on CT results.  COVID-19 test still pending at this time.  Urine cultures 

positive for Proteus mirabilis; blood cultures positive for Proteus mirabilis as

well.  Continue treatment for pyelonephritis with Zosyn, per ID.  Will follow 

sensitivities.  If COVID-19 positive, will initiate remdesivir.





Vitals/I&O


Vitals/I&O:





                                   Vital Signs








  Date Time  Temp Pulse Resp B/P (MAP) Pulse Ox O2 Delivery O2 Flow Rate FiO2


 


4/18/21 11:00 98.0 99 20 173/99 (123) 99 Nasal Cannula 3.0 





 98.0       














                                    I & O   


 


 4/17/21 4/17/21 4/18/21





 15:00 23:00 07:00


 


Intake Total  600 ml 360 ml


 


Output Total 2100 ml 1450 ml 500 ml


 


Balance -2100 ml -850 ml -140 ml











Physical Exam


Physical Exam:


GENERAL:  Alert, oriented x 3, morbidly obese male, nontoxic appearing, lying in


bed comfortably,


HEENT:  Normocephalic, atraumatic.  Anicteric.  No thrush.


NECK:  Fullness present.


LUNGS: Decreased breath sounds bilaterally


HEART:  S1, S2.


ABDOMEN:  Morbidly obese.  Bowel sounds present, no rebound or guarding


EXTREMITIES:  No edema, no cyanosis.


DERMATOLOGIC:  Warm and dry.  No generalized rash.


NEUROLOGIC:  Alert, oriented x 3, grossly nonfocal.


PSYCHIATRIC:  Cooperative, appropriate mood and affect.


General:  Alert


Heart:  Regular rate


Lungs:  Clear


Abdomen:  Soft, Other (Right upper quadrant tenderness)


Extremities:  No clubbing, No cyanosis


Skin:  No rashes





Labs


Labs:





Laboratory Tests








Test


 4/17/21


12:04 4/17/21


16:55 4/17/21


19:51 4/18/21


03:00


 


Glucose (Fingerstick)


 78 mg/dL


(70-99) 124 mg/dL


(70-99) 190 mg/dL


(70-99) 





 


White Blood Count


 


 


 


 12.9 x10^3/uL


(4.0-11.0)


 


Red Blood Count


 


 


 


 4.77 x10^6/uL


(4.30-5.70)


 


Hemoglobin


 


 


 


 11.8 g/dL


(13.0-17.5)


 


Hematocrit


 


 


 


 36.3 %


(39.0-53.0)


 


Mean Corpuscular Volume    76 fL () 


 


Mean Corpuscular Hemoglobin    25 pg (25-35) 


 


Mean Corpuscular Hemoglobin


Concent 


 


 


 33 g/dL


(31-37)


 


Red Cell Distribution Width


 


 


 


 15.1 %


(11.5-14.5)


 


Platelet Count


 


 


 


 379 x10^3/uL


(140-400)


 


Neutrophils (%) (Auto)    75 % (31-73) 


 


Lymphocytes (%) (Auto)    14 % (24-48) 


 


Monocytes (%) (Auto)    9 % (0-9) 


 


Eosinophils (%) (Auto)    2 % (0-3) 


 


Basophils (%) (Auto)    0 % (0-3) 


 


Neutrophils # (Auto)


 


 


 


 9.6 x10^3/uL


(1.8-7.7)


 


Lymphocytes # (Auto)


 


 


 


 1.8 x10^3/uL


(1.0-4.8)


 


Monocytes # (Auto)


 


 


 


 1.2 x10^3/uL


(0.0-1.1)


 


Eosinophils # (Auto)


 


 


 


 0.2 x10^3/uL


(0.0-0.7)


 


Basophils # (Auto)


 


 


 


 0.0 x10^3/uL


(0.0-0.2)


 


Sodium Level


 


 


 


 140 mmol/L


(136-145)


 


Potassium Level


 


 


 


 4.2 mmol/L


(3.5-5.1)


 


Chloride Level


 


 


 


 101 mmol/L


()


 


Carbon Dioxide Level


 


 


 


 28 mmol/L


(21-32)


 


Anion Gap    11 (6-14) 


 


Blood Urea Nitrogen


 


 


 


 14 mg/dL


(8-26)


 


Creatinine


 


 


 


 0.9 mg/dL


(0.7-1.3)


 


Estimated GFR


(Cockcroft-Gault) 


 


 


 112.0 





 


Glucose Level


 


 


 


 125 mg/dL


(70-99)


 


Calcium Level


 


 


 


 7.9 mg/dL


(8.5-10.1)


 


Test


 4/18/21


07:34 4/18/21


11:39 


 





 


Glucose (Fingerstick)


 116 mg/dL


(70-99) 185 mg/dL


(70-99) 


 














Assessment and Plan


Assessmemt and Plan


Problems


Medical Problems:


(1) Hypomagnesemia


Status: Acute  





(2) Pyelonephritis


Status: Acute  





(3) Septic shock


Status: Acute  





(4) Suspected 2019 novel coronavirus infection


Status: Acute  








Goals of Care:


Advance Care Planning: Total time spent face-to-face with patient greater than 

16 minutes in discussion with goals of care, comfort care, end-of-life care, 

pain management, code status





Comment


Review of Relevant


I have reviewed the following items martha (where applicable) has been applied.


Medications:





Current Medications








 Medications


  (Trade)  Dose


 Ordered  Sig/Pratik


 Route


 PRN Reason  Start Time


 Stop Time Status Last Admin


Dose Admin


 


 Amoxicillin/


 Clavulanate


 Potassium


  (Augmentin 875/


 125mg)  1 tab  BID


 PO


   4/18/21 09:00


    4/18/21 08:45














Justifications for Admission


Other Justification


Acute respiratory failure with hypoxia, pyelonephritis, COVID-19 GARY ENRIQUEZ MD            Apr 18, 2021 11:58

## 2021-04-18 NOTE — PDOC
Infectious Disease Note


Subjective:


Subjective


Patient without new complaints


Has green sputum production


Still remains on 3 L O2 by nasal cannula


Tolerating p.o. intake well





Vital Signs:


Vital Signs





Vital Signs








  Date Time  Temp Pulse Resp B/P (MAP) Pulse Ox O2 Delivery O2 Flow Rate FiO2


 


4/18/21 07:00 97.7 73 20 129/73 (91) 97 Nasal Cannula 3.0 





 97.7       











Physical Exam:


PHYSICAL EXAM


GENERAL:  Alert, oriented x 3, morbidly obese male, nontoxic appearing, lying in


bed comfortably,


HEENT:  Normocephalic, atraumatic.  Anicteric.  No thrush.


NECK:  Fullness present.


LUNGS: Decreased breath sounds bilaterally


HEART:  S1, S2.


ABDOMEN:  Morbidly obese.  Bowel sounds present, no rebound or guarding


EXTREMITIES:  No edema, no cyanosis.


DERMATOLOGIC:  Warm and dry.  No generalized rash.


NEUROLOGIC:  Alert, oriented x 3, grossly nonfocal.


PSYCHIATRIC:  Cooperative, appropriate mood and affect.





Medications:


Inpatient Meds:


Medications reviewed.





Labs:


Lab





Laboratory Tests








Test


 4/17/21


12:04 4/17/21


16:55 4/17/21


19:51 4/18/21


03:00


 


Glucose (Fingerstick)


 78 mg/dL


(70-99) 124 mg/dL


(70-99) 190 mg/dL


(70-99) 





 


White Blood Count


 


 


 


 12.9 x10^3/uL


(4.0-11.0)


 


Red Blood Count


 


 


 


 4.77 x10^6/uL


(4.30-5.70)


 


Hemoglobin


 


 


 


 11.8 g/dL


(13.0-17.5)


 


Hematocrit


 


 


 


 36.3 %


(39.0-53.0)


 


Mean Corpuscular Volume    76 fL () 


 


Mean Corpuscular Hemoglobin    25 pg (25-35) 


 


Mean Corpuscular Hemoglobin


Concent 


 


 


 33 g/dL


(31-37)


 


Red Cell Distribution Width


 


 


 


 15.1 %


(11.5-14.5)


 


Platelet Count


 


 


 


 379 x10^3/uL


(140-400)


 


Neutrophils (%) (Auto)    75 % (31-73) 


 


Lymphocytes (%) (Auto)    14 % (24-48) 


 


Monocytes (%) (Auto)    9 % (0-9) 


 


Eosinophils (%) (Auto)    2 % (0-3) 


 


Basophils (%) (Auto)    0 % (0-3) 


 


Neutrophils # (Auto)


 


 


 


 9.6 x10^3/uL


(1.8-7.7)


 


Lymphocytes # (Auto)


 


 


 


 1.8 x10^3/uL


(1.0-4.8)


 


Monocytes # (Auto)


 


 


 


 1.2 x10^3/uL


(0.0-1.1)


 


Eosinophils # (Auto)


 


 


 


 0.2 x10^3/uL


(0.0-0.7)


 


Basophils # (Auto)


 


 


 


 0.0 x10^3/uL


(0.0-0.2)


 


Sodium Level


 


 


 


 140 mmol/L


(136-145)


 


Potassium Level


 


 


 


 4.2 mmol/L


(3.5-5.1)


 


Chloride Level


 


 


 


 101 mmol/L


()


 


Carbon Dioxide Level


 


 


 


 28 mmol/L


(21-32)


 


Anion Gap    11 (6-14) 


 


Blood Urea Nitrogen


 


 


 


 14 mg/dL


(8-26)


 


Creatinine


 


 


 


 0.9 mg/dL


(0.7-1.3)


 


Estimated GFR


(Cockcroft-Gault) 


 


 


 112.0 





 


Glucose Level


 


 


 


 125 mg/dL


(70-99)


 


Calcium Level


 


 


 


 7.9 mg/dL


(8.5-10.1)


 


Test


 4/18/21


07:34 


 


 





 


Glucose (Fingerstick)


 116 mg/dL


(70-99) 


 


 














Objective:


Assessment:





1.  Sepsis from gram-negative bacteremia.


2.  Proteus Mirabella's bacteremia.


3.  Fever.


4.  Leukocytosis.


5.  Lactic acidosis.


6.  Proteus mirabilis urinary tract infection.


7.  Nausea.


8.  Morbid obesity.


9.  Diabetes mellitus type 2.


10.  Covid PCR negative


11.  Cholelithiasis


12.  Hypoxic respiratory failure, possible BC





Plan:


Plan of Care


 


DC ceftriaxone


Start Augmentin


Trend WBC 12.9 K today


Probiotics


Maintain aspiration precaution


Continue supportive care











NICA MINA MD           Apr 18, 2021 08:20

## 2021-04-19 VITALS — SYSTOLIC BLOOD PRESSURE: 121 MMHG | DIASTOLIC BLOOD PRESSURE: 73 MMHG

## 2021-04-19 VITALS — SYSTOLIC BLOOD PRESSURE: 124 MMHG | DIASTOLIC BLOOD PRESSURE: 88 MMHG

## 2021-04-19 VITALS — DIASTOLIC BLOOD PRESSURE: 76 MMHG | SYSTOLIC BLOOD PRESSURE: 132 MMHG

## 2021-04-19 VITALS — DIASTOLIC BLOOD PRESSURE: 68 MMHG | SYSTOLIC BLOOD PRESSURE: 135 MMHG

## 2021-04-19 VITALS — SYSTOLIC BLOOD PRESSURE: 128 MMHG | DIASTOLIC BLOOD PRESSURE: 74 MMHG

## 2021-04-19 VITALS — SYSTOLIC BLOOD PRESSURE: 122 MMHG | DIASTOLIC BLOOD PRESSURE: 70 MMHG

## 2021-04-19 LAB
ALBUMIN SERPL-MCNC: 2.6 G/DL (ref 3.4–5)
ALP SERPL-CCNC: 130 U/L (ref 46–116)
ALT SERPL-CCNC: 68 U/L (ref 16–63)
ANION GAP SERPL CALC-SCNC: 9 MMOL/L (ref 6–14)
AST SERPL-CCNC: 32 U/L (ref 15–37)
BASOPHILS # BLD AUTO: 0 X10^3/UL (ref 0–0.2)
BASOPHILS NFR BLD: 0 % (ref 0–3)
BILIRUB DIRECT SERPL-MCNC: 0.1 MG/DL (ref 0–0.2)
BILIRUB SERPL-MCNC: 0.3 MG/DL (ref 0.2–1)
BUN SERPL-MCNC: 11 MG/DL (ref 8–26)
CALCIUM SERPL-MCNC: 8.3 MG/DL (ref 8.5–10.1)
CHLORIDE SERPL-SCNC: 99 MMOL/L (ref 98–107)
CO2 SERPL-SCNC: 30 MMOL/L (ref 21–32)
CREAT SERPL-MCNC: 0.8 MG/DL (ref 0.7–1.3)
EOSINOPHIL NFR BLD: 0.3 X10^3/UL (ref 0–0.7)
EOSINOPHIL NFR BLD: 2 % (ref 0–3)
ERYTHROCYTE [DISTWIDTH] IN BLOOD BY AUTOMATED COUNT: 15.3 % (ref 11.5–14.5)
GFR SERPLBLD BASED ON 1.73 SQ M-ARVRAT: 128.3 ML/MIN
GLUCOSE SERPL-MCNC: 162 MG/DL (ref 70–99)
HCT VFR BLD CALC: 38.4 % (ref 39–53)
HGB BLD-MCNC: 12.2 G/DL (ref 13–17.5)
LYMPHOCYTES # BLD: 2.2 X10^3/UL (ref 1–4.8)
LYMPHOCYTES NFR BLD AUTO: 16 % (ref 24–48)
MCH RBC QN AUTO: 24 PG (ref 25–35)
MCHC RBC AUTO-ENTMCNC: 32 G/DL (ref 31–37)
MCV RBC AUTO: 77 FL (ref 79–100)
MONO #: 1 X10^3/UL (ref 0–1.1)
MONOCYTES NFR BLD: 7 % (ref 0–9)
NEUT #: 10.4 X10^3/UL (ref 1.8–7.7)
NEUTROPHILS NFR BLD AUTO: 74 % (ref 31–73)
PLATELET # BLD AUTO: 444 X10^3/UL (ref 140–400)
POTASSIUM SERPL-SCNC: 4.5 MMOL/L (ref 3.5–5.1)
PROT SERPL-MCNC: 7.8 G/DL (ref 6.4–8.2)
RBC # BLD AUTO: 5 X10^6/UL (ref 4.3–5.7)
SODIUM SERPL-SCNC: 138 MMOL/L (ref 136–145)
WBC # BLD AUTO: 14 X10^3/UL (ref 4–11)

## 2021-04-19 RX ADMIN — AMOXICILLIN AND CLAVULANATE POTASSIUM SCH TAB: 875; 125 TABLET, FILM COATED ORAL at 09:12

## 2021-04-19 RX ADMIN — PANTOPRAZOLE SODIUM SCH MG: 40 TABLET, DELAYED RELEASE ORAL at 17:14

## 2021-04-19 RX ADMIN — ENOXAPARIN SODIUM SCH MG: 100 INJECTION SUBCUTANEOUS at 20:58

## 2021-04-19 RX ADMIN — ATORVASTATIN CALCIUM SCH MG: 40 TABLET, FILM COATED ORAL at 20:57

## 2021-04-19 RX ADMIN — Medication SCH CAP: at 20:57

## 2021-04-19 RX ADMIN — HYDROCODONE BITARTRATE AND ACETAMINOPHEN PRN TAB: 5; 325 TABLET ORAL at 17:14

## 2021-04-19 RX ADMIN — HYDROCODONE BITARTRATE AND ACETAMINOPHEN PRN TAB: 5; 325 TABLET ORAL at 01:36

## 2021-04-19 RX ADMIN — Medication SCH CAP: at 09:13

## 2021-04-19 RX ADMIN — INSULIN LISPRO SCH UNITS: 100 INJECTION, SOLUTION INTRAVENOUS; SUBCUTANEOUS at 09:35

## 2021-04-19 RX ADMIN — INSULIN LISPRO SCH UNITS: 100 INJECTION, SOLUTION INTRAVENOUS; SUBCUTANEOUS at 09:34

## 2021-04-19 RX ADMIN — HYDROCODONE BITARTRATE AND ACETAMINOPHEN PRN TAB: 5; 325 TABLET ORAL at 09:38

## 2021-04-19 RX ADMIN — INSULIN LISPRO SCH UNITS: 100 INJECTION, SOLUTION INTRAVENOUS; SUBCUTANEOUS at 16:35

## 2021-04-19 RX ADMIN — ASPIRIN 81 MG SCH MG: 81 TABLET ORAL at 09:13

## 2021-04-19 RX ADMIN — INSULIN LISPRO SCH UNITS: 100 INJECTION, SOLUTION INTRAVENOUS; SUBCUTANEOUS at 12:34

## 2021-04-19 RX ADMIN — ENOXAPARIN SODIUM SCH MG: 100 INJECTION SUBCUTANEOUS at 09:13

## 2021-04-19 RX ADMIN — AMOXICILLIN AND CLAVULANATE POTASSIUM SCH TAB: 875; 125 TABLET, FILM COATED ORAL at 20:57

## 2021-04-19 RX ADMIN — INSULIN LISPRO SCH UNITS: 100 INJECTION, SOLUTION INTRAVENOUS; SUBCUTANEOUS at 17:23

## 2021-04-19 RX ADMIN — INSULIN GLARGINE SCH UNIT: 100 INJECTION, SOLUTION SUBCUTANEOUS at 21:03

## 2021-04-19 RX ADMIN — FLUTICASONE PROPIONATE SCH SPRAY: 50 SPRAY, METERED NASAL at 09:12

## 2021-04-19 RX ADMIN — INSULIN LISPRO SCH UNITS: 100 INJECTION, SOLUTION INTRAVENOUS; SUBCUTANEOUS at 12:00

## 2021-04-19 RX ADMIN — FLUTICASONE FUROATE AND VILANTEROL TRIFENATATE SCH PUFF: 100; 25 POWDER RESPIRATORY (INHALATION) at 09:15

## 2021-04-19 RX ADMIN — LOSARTAN POTASSIUM SCH MG: 50 TABLET ORAL at 09:14

## 2021-04-19 RX ADMIN — INSULIN GLARGINE SCH UNIT: 100 INJECTION, SOLUTION SUBCUTANEOUS at 09:36

## 2021-04-19 RX ADMIN — MONTELUKAST SODIUM SCH MG: 10 TABLET, FILM COATED ORAL at 20:57

## 2021-04-19 NOTE — PDOC
Infectious Disease Note


Subjective:


Subjective


Patient feels better


Has diarrhea


Still remains on 3 L O2 by nasal cannula





Vital Signs:


Vital Signs





Vital Signs








  Date Time  Temp Pulse Resp B/P (MAP) Pulse Ox O2 Delivery O2 Flow Rate FiO2


 


4/19/21 09:43      Nasal Cannula 3.0 


 


4/19/21 09:38   20  99   


 


4/19/21 09:14  86  135/68    


 


4/19/21 07:49 98.2       





 98.2       











Physical Exam:


PHYSICAL EXAM


GENERAL:  Alert, oriented x 3, morbidly obese male, nontoxic appearing, lying in


bed comfortably,


HEENT:  Normocephalic, atraumatic.  Anicteric.  No thrush.


NECK:  Fullness present.


LUNGS: Decreased breath sounds bilaterally


HEART:  S1, S2.


ABDOMEN:  Morbidly obese.  Bowel sounds present, no rebound or guarding


EXTREMITIES:  No edema, no cyanosis.


DERMATOLOGIC:  Warm and dry.  No generalized rash.


NEUROLOGIC:  Alert, oriented x 3, grossly nonfocal.


PSYCHIATRIC:  Cooperative, appropriate mood and affect.





Medications:


Inpatient Meds:


Medications reviewed.





Labs:


Lab





Laboratory Tests








Test


 4/18/21


11:39 4/18/21


16:55 4/18/21


19:41 4/19/21


07:42


 


Glucose (Fingerstick)


 185 mg/dL


(70-99) 116 mg/dL


(70-99) 193 mg/dL


(70-99) 158 mg/dL


(70-99)


 


Test


 4/19/21


08:20 4/19/21


11:15 


 





 


White Blood Count


 14.0 x10^3/uL


(4.0-11.0) 


 


 





 


Red Blood Count


 5.00 x10^6/uL


(4.30-5.70) 


 


 





 


Hemoglobin


 12.2 g/dL


(13.0-17.5) 


 


 





 


Hematocrit


 38.4 %


(39.0-53.0) 


 


 





 


Mean Corpuscular Volume 77 fL ()    


 


Mean Corpuscular Hemoglobin 24 pg (25-35)    


 


Mean Corpuscular Hemoglobin


Concent 32 g/dL


(31-37) 


 


 





 


Red Cell Distribution Width


 15.3 %


(11.5-14.5) 


 


 





 


Platelet Count


 444 x10^3/uL


(140-400) 


 


 





 


Neutrophils (%) (Auto) 74 % (31-73)    


 


Lymphocytes (%) (Auto) 16 % (24-48)    


 


Monocytes (%) (Auto) 7 % (0-9)    


 


Eosinophils (%) (Auto) 2 % (0-3)    


 


Basophils (%) (Auto) 0 % (0-3)    


 


Neutrophils # (Auto)


 10.4 x10^3/uL


(1.8-7.7) 


 


 





 


Lymphocytes # (Auto)


 2.2 x10^3/uL


(1.0-4.8) 


 


 





 


Monocytes # (Auto)


 1.0 x10^3/uL


(0.0-1.1) 


 


 





 


Eosinophils # (Auto)


 0.3 x10^3/uL


(0.0-0.7) 


 


 





 


Basophils # (Auto)


 0.0 x10^3/uL


(0.0-0.2) 


 


 





 


Sodium Level


 138 mmol/L


(136-145) 


 


 





 


Potassium Level


 4.5 mmol/L


(3.5-5.1) 


 


 





 


Chloride Level


 99 mmol/L


() 


 


 





 


Carbon Dioxide Level


 30 mmol/L


(21-32) 


 


 





 


Anion Gap 9 (6-14)    


 


Blood Urea Nitrogen


 11 mg/dL


(8-26) 


 


 





 


Creatinine


 0.8 mg/dL


(0.7-1.3) 


 


 





 


Estimated GFR


(Cockcroft-Gault) 128.3 


 


 


 





 


Glucose Level


 162 mg/dL


(70-99) 


 


 





 


Calcium Level


 8.3 mg/dL


(8.5-10.1) 


 


 





 


Glucose (Fingerstick)


 


 105 mg/dL


(70-99) 


 














Objective:


Assessment:





1.  Sepsis from gram-negative bacteremia.


2.  Proteus Mirabella's bacteremia.


3.  Fever.


4.  Leukocytosis.


5.  Lactic acidosis.


6.  Proteus mirabilis urinary tract infection.


7.  Nausea.


8.  Morbid obesity.


9.  Diabetes mellitus type 2.


10.  Covid PCR negative


11.  Cholelithiasis


12.  Hypoxic respiratory failure, possible BC





Plan:


Plan of Care


 


Continue Augmentin


Check C. difficile PCR


Trend WBC 14 K today


Probiotics


May need repeat imaging


Pulmonary team following


Continue supportive care











NCIA MINA MD           Apr 19, 2021 11:33

## 2021-04-19 NOTE — RAD
EXAM: Chest, single view.



HISTORY: Hypoxia.



COMPARISON: 1/4/2021



FINDINGS: A frontal view of the chest is obtained. There is stable mild diffuse interstitial prominen
ce without thierry congestion. There is suspected linear atelectasis within the right middle lobe. Ther
e is no consolidation, pleural effusion or pneumothorax. The heart is normal in size.



IMPRESSION: Stable diffuse interstitial prominence with suspected linear right middle lobe atelectasi
s. No consolidated infiltrate is seen.



Electronically signed by: Katheryn Martinez MD (4/19/2021 2:35 PM) [id number]

## 2021-04-19 NOTE — PDOC
PROGRESS NOTES


Date of Service:


DATE: 21 


TIME: 08:36





Chief Complaint


Chief Complaint





IMPRESSION =====================








Sepsis


Acute respiratory failure with hypoxia REMAINS ON 3 LITERS NC 


Right pyelonephritis


DM2 with hyperglycemia


Moderate malnutrition


COVID-19 PUI


Sepsis from gram-negative bacteremia.


Proteus Mirabella's bacteremia.

















Plan:





Patient received fluids and broad-spectrum antibiotics and the ED.


Will continue treatment of asthma exacerbation and pyelonephritis with Rocephin 

daily and doxycycline IV twice daily.


Consultation to pulmonology acute hypoxia COVID-19 PUI


Treat empirically with Decadron 6 mg daily


COVID-19 and influenza NEG 


Basal/prandial insulin; A1c pending.


Resume home medications


FEN - Cardiac diet


PPX  - Lovenox


FULL CODE


Continue Augmentin


Check C. difficile PCR ASAP


Trend WBC 14 K today


Probiotics


May need repeat imaging


Dispo - inpatient for above; patient names his girlfriend as his surrogate dec

mari-maker.











2021





Afebrile, some hypertension YESTERDAY   Breathing on 3 L nasal cannula; 

apparently is waiting for home CPAP machine.  Will obtain 6-minute walk OL TODAY

ON RA   Will discharge  on Augmentin twice daily to complete a 2-week course.


3 LARGE WATERY FOUL SMELLING STOOLS TODAY SINCE 3 AM





History of Present Illness


History of Present Illness


Patient is a 40 old male with past medical history asthma, hypertension, DM2, 

presents to the ER with complaints of worsening shortness of breath over the 

past 2 days.  Reports associated nausea, vomiting, diarrhea, body aches, fever, 

chills, and polyuria.  He has been taking his home albuterol inhaler, symptoms 

acutely worsened when he ran out with this medication.  Upon arrival to the ED 

he was tachypnea, febrile, saturating 99% on 5 L nasal cannula.  CT 

chest/abdomen/pelvis obtained on admission showed subtle right perinephric 

stranding; gallstones, no PE or chest abnormality.  Will admit patient for 

further medical management.





2021


Afebrile, some hypertension this morning.  Breathing on 3 L nasal cannula; 

apparently is waiting for home CPAP machine.  Will obtain 6-minute walk prior to

discharge tomorrow.  Will discharge tomorrow on Augmentin twice daily to comp

lete a 2-week course.





2021


Afebrile.  Feeling well today.  Speaking to me on room air at the time of my 

evaluation.  Continue IV Rocephin.  Anticipate discharge Monday on cefdinir.





2021


Patient seen and evaluated bedside.  States he feels better, denies fever.  

Denies chest pain or diarrhea.  Discussed with ID, will continue on IV Rocephin 

for now, and likely switch to cefdinir 100 mg p.o. twice daily for 10 days when 

ready to discharge likely on Monday.  At that time will obtain 6-minute walk. 

Still breathing on 2 L nasal cannula; will need 6-minute walk prior to discharge

due to suspected obesity hypoventilation syndrome or BC.





4/15/2021


Patient seen and evaluated bedside.  He is afebrile, currently breathing on 2 L 

nasal cannula.  He is COVID-19 negative.  Urine and blood cultures positive for 

Proteus mirabilis; sensitivities largely pansensitive except resistant to 

nitrofurantoin and tetracycline..  Will continue treatment of his pyelonephritis

with Zosyn, per ID.  He still complains of epigastric pain pain with deep 

inspiration, and flank pain.  Some right upper quadrant tenderness on exam, he 

does report some pain after eating.  On admission CT did show cholelithiasis.  

Will obtain ultrasound to rule out cholecystitis.  Discussed with RN.





2021


Afebrile, currently breathing on 2 L nasal cannula.  He denies any sensation of 

shortness of breath.  Discussed with Dr. Yun, will concern for COVID-19 based 

on CT results.  COVID-19 test still pending at this time.  Urine cultures 

positive for Proteus mirabilis; blood cultures positive for Proteus mirabilis as

well.  Continue treatment for pyelonephritis with Zosyn, per ID.  Will follow 

sensitivities.  If COVID-19 positive, will initiate remdesivir.





Vitals


Vitals





Vital Signs








  Date Time  Temp Pulse Resp B/P (MAP) Pulse Ox O2 Delivery O2 Flow Rate FiO2


 


21 07:49 98.2 86 22 135/68 (90) 99 Nasal Cannula 3.0 





 98.2       











Physical Exam


Physical Exam


GENERAL:  Alert, oriented x 3, morbidly obese male, nontoxic appearing, lying in


bed comfortably,


HEENT:  Normocephalic, atraumatic.  Anicteric.  No thrush.


NECK:  Fullness present.


LUNGS: Decreased breath sounds bilaterally


HEART:  S1, S2.


ABDOMEN:  Morbidly obese.  Bowel sounds present, no rebound or guarding


EXTREMITIES:  No edema, no cyanosis.


DERMATOLOGIC:  Warm and dry.  No generalized rash.


NEUROLOGIC:  Alert, oriented x 3, grossly nonfocal.


PSYCHIATRIC:  Cooperative, appropriate mood and affect.


General:  Alert, Oriented X3, Cooperative, No acute distress


Heart:  Regular rate


Lungs:  Clear


Abdomen:  Normal bowel sounds, Soft, No tenderness, Other (Right upper quadrant 

tenderness)


Extremities:  No clubbing, No cyanosis


Skin:  No rashes





Labs


LABS


 

--------------------------------------------------------------------------------


-----------





  Procedure                         Result                                      

         


----------------------------------------

----------------------------------------------------





  URINE CULTURE  Final  


        Final





        GREATER THAN 100,000 CFU/ML GRAM NEGATIVE RODS on 21


        at 0821


        FINAL ID= [PROTEUS MIRABILIS]


                           Testing Performed by:


                         [address] [address]


          For Inquires, the Physician may contact the Microbiology


                        department at [phone]


        PROTEUS MIRABILIS





  ANTIMICROBIAL SUSCEPTIBILITY  Final  


        Comment





        NEG KAR 56


        PROTEUS MIRABILIS


        ANTIBIOTIC                        RESULT          INTERPRETATION


        AMPICILLIN/SULBACTAM              <=4/2                 S


        AMIKACIN                          <=16                  S


        AMPICILLIN                        <=8                   S


        AMOXICILLIN/K CLAVULANATE         <=8/4                 S


        AZTREONAM                         <=4                   S


        CEFTRIAXONE                       <=1                   S


        CEFTAZIDIME                       <=1                   S


        CEFOTAXIME                        <=2                   S


        CEFOXITIN                         <=8                   S


        CEFAZOLIN                         4                     S


        CIPROFLOXACIN                     <=0.25                S


        CEFEPIME                          <=2                   S


        CEFUROXIME                        <=4                   S


        CEFTAZIDIME/AVIBACTAM             <=4                   S


        ERTAPENEM                         <=0.5                 S


        NITROFURANTOIN                    >64                   R*


        GENTAMICIN                        <=2                   S


        LEVOFLOXACIN                      <=0.5                 S


        MEROPENEM                         <=1                   S


        PIPERACILLIN/TAZOBACTAM           <=8                   S


        TRIMETHOPRIM/SULFAMETHOXAZOLE     <=0.5/9.5             S


        TETRACYCLINE                      >8                    R


        TOBRAMYCIN                        <=2                   S


PATIENT: TERESA SAMSON AACCOUNT: [id number]     MRN#: [id number]


: 1980           LOCATION: [address] [place] [address]         AGE: 40


SEX: M                    EXAM DT: 04/15/21         ACCESSION#: [id number]


STATUS: ADM IN            ORD. PHYSICIAN: GARY MARINO MD


REASON: cholecyctisitis


PROCEDURE: ABDOMEN LTD





EXAMINATION: RIGHT UPPER QUADRANT ULTRASOUND  





CLINICAL HISTORY: Right upper quadrant pain concerning for cholecystitis





TECHNIQUE: Sonography of the right upper quadrant was performed.  





COMPARISON: CT chest/abdomen/pelvis 2021








FINDINGS: 





Significantly limited evaluation secondary to patient body habitus, limited 

mobility, and prominent bowel gas.





Pancreas: Obscured by prominent overlying bowel gas.


   


Liver: Mild to moderately enlarged.


- Echotexture: Homogeneous


- Echogenicity: Increased, suggestive of steatosis


- Surface contour: Smooth


- Lesions: None.


     


Biliary: No intrahepatic biliary duct dilation. 


     - CBD: 4 mm.


     - Gallbladder: Normal caliber.


          - Contents: Cholelithiasis.


          - Wall: No abnormal thickening.


          - Other: No pericholecystic fluid.





Right Kidney: Not visualized. 





Ascites: None.





Aorta/IVC: Poorly visualized.








IMPRESSION:  





Significantly limited evaluation as described.





Cholelithiasis without evidence of acute cholecystitis.





Hepatomegaly with findings suggestive of steatosis.





Electronically signed by: Judah Mary DO (4/15/2021 5:50 PM) [id number]














DICTATED and SIGNED BY:     JUDAH MARY DO


DATE:     04/15/21 [id number] 0





INDICATION: Flank pain, shortness of breath





TECHNIQUE: Sequential axial images through the chest, abdomen and pelvis 

obtained following the administration of 100 mL of Omni 350 IV contrast. 

Sagittal and coronal reformatted images were reconstructed from the axial data 

and reviewed.





Comparisons: None





FINDINGS:


Visualized portions of the thyroid are unremarkable. No enlarged mediastinal 

lymph nodes.





Heart size is normal. No pericardial effusion. Thoracic aorta has a normal 

course and caliber. Pulmonary artery is not enlarged. Airways are patent. No 

consolidation or pneumothorax. No suspicious lung nodules are identified.





No pleural effusion or thickening





Liver, spleen, pancreas, and adrenals are unremarkable. Gallstones are noted 

within the gallbladder.





Subtle right perinephric stranding. No hydronephrosis. No renal or ureteral 

calculi are identified.





Bladder is decompressed not well evaluated. Prostate is not enlarged.





Large and small bowel are unremarkable. Appendix is normal. No free intra-

abdominal air or fluid. No obstruction.





Abdominal aorta has a normal course and caliber. Abdominal vasculature is 

patent.





No enlarged intra-abdominal lymph nodes are identified.





No suspicious osseous lesions or acute fractures.





IMPRESSION:


1.  Markedly Limited evaluation secondary to body habitus and movement at time 

of contrast bolus timing. Examination is nondiagnostic for pulmonary embolus.


2.  Essentially a noncontrast CT of the chest was performed. No abnormalities 

are identified in the chest.


3.  Gallstones and within the gallbladder. Correlate with symptomatology.


4.  Subtle right perinephric stranding. Correlate with urinalysis for infection.








Exposure: One or more of the following in the visualized dose reduction 

techniques were utilized for this examination:


1.  Automated exposure control


2.  Adjustment of the MA and/or KV according to patient size


3.  Use of iterative of reconstructive technique





Electronically signed by: Loren Pfeiffer MD (2021 11:29 PM) [facility]














DICTATED and SIGNED BY:     LOREN PFEIFFER MD


DATE:     21 [id number] 0





Laboratory Tests








Test


 21


11:39 21


16:55 21


19:41 21


07:42


 


Glucose (Fingerstick)


 185 mg/dL


(70-99) 116 mg/dL


(70-99) 193 mg/dL


(70-99) 158 mg/dL


(70-99)











Assessment and Plan


Assessmemt and Plan


Problems


Medical Problems:


(1) Hypomagnesemia


Status: Acute  





(2) Pyelonephritis


Status: Acute  





(3) Septic shock


Status: Acute  





(4) Suspected 2019 novel coronavirus infection


Status: Acute  











Comment


Review of Relevant


I have reviewed the following items martha (where applicable) has been applied.


Labs





Laboratory Tests








Test


 21


12:04 21


16:55 21


19:51 21


03:00


 


Glucose (Fingerstick)


 78 mg/dL


(70-99) 124 mg/dL


(70-99) 190 mg/dL


(70-99) 





 


White Blood Count


 


 


 


 12.9 x10^3/uL


(4.0-11.0)


 


Red Blood Count


 


 


 


 4.77 x10^6/uL


(4.30-5.70)


 


Hemoglobin


 


 


 


 11.8 g/dL


(13.0-17.5)


 


Hematocrit


 


 


 


 36.3 %


(39.0-53.0)


 


Mean Corpuscular Volume    76 fL () 


 


Mean Corpuscular Hemoglobin    25 pg (25-35) 


 


Mean Corpuscular Hemoglobin


Concent 


 


 


 33 g/dL


(31-37)


 


Red Cell Distribution Width


 


 


 


 15.1 %


(11.5-14.5)


 


Platelet Count


 


 


 


 379 x10^3/uL


(140-400)


 


Neutrophils (%) (Auto)    75 % (31-73) 


 


Lymphocytes (%) (Auto)    14 % (24-48) 


 


Monocytes (%) (Auto)    9 % (0-9) 


 


Eosinophils (%) (Auto)    2 % (0-3) 


 


Basophils (%) (Auto)    0 % (0-3) 


 


Neutrophils # (Auto)


 


 


 


 9.6 x10^3/uL


(1.8-7.7)


 


Lymphocytes # (Auto)


 


 


 


 1.8 x10^3/uL


(1.0-4.8)


 


Monocytes # (Auto)


 


 


 


 1.2 x10^3/uL


(0.0-1.1)


 


Eosinophils # (Auto)


 


 


 


 0.2 x10^3/uL


(0.0-0.7)


 


Basophils # (Auto)


 


 


 


 0.0 x10^3/uL


(0.0-0.2)


 


Sodium Level


 


 


 


 140 mmol/L


(136-145)


 


Potassium Level


 


 


 


 4.2 mmol/L


(3.5-5.1)


 


Chloride Level


 


 


 


 101 mmol/L


()


 


Carbon Dioxide Level


 


 


 


 28 mmol/L


(21-32)


 


Anion Gap    11 (6-14) 


 


Blood Urea Nitrogen


 


 


 


 14 mg/dL


(8-26)


 


Creatinine


 


 


 


 0.9 mg/dL


(0.7-1.3)


 


Estimated GFR


(Cockcroft-Gault) 


 


 


 112.0 





 


Glucose Level


 


 


 


 125 mg/dL


(70-99)


 


Calcium Level


 


 


 


 7.9 mg/dL


(8.5-10.1)


 


Test


 21


07:34 21


11:39 21


16:55 21


19:41


 


Glucose (Fingerstick)


 116 mg/dL


(70-99) 185 mg/dL


(70-99) 116 mg/dL


(70-99) 193 mg/dL


(70-99)


 


Test


 21


07:42 


 


 





 


Glucose (Fingerstick)


 158 mg/dL


(70-99) 


 


 











Laboratory Tests








Test


 21


11:39 21


16:55 21


19:41 21


07:42


 


Glucose (Fingerstick)


 185 mg/dL


(70-99) 116 mg/dL


(70-99) 193 mg/dL


(70-99) 158 mg/dL


(70-99)








Microbiology


21 Urine Culture - Final, Complete


          


21 Antimicrobic Susceptibility - Final, Complete


          


21 Blood Culture - Final, Complete


          


21 Antimicrobic Susceptibility - Final, Complete


Medications





Current Medications


Sodium Chloride 1,000 ml @  1,000 mls/hr Q1H IV  Last administered on 21at 

21:52;  Start 21 at 22:00;  Stop 21 at 22:59;  Status DC


Fentanyl Citrate (Fentanyl 2ml Vial) 50 mcg 1X  ONCE IVP  Last administered on 

21at 21:54;  Start 21 at 22:00;  Stop 21 at 22:01;  Status DC


Ondansetron HCl (Zofran) 4 mg 1X  ONCE IVP  Last administered on 21at 

21:54;  Start 21 at 22:00;  Stop 21 at 22:01;  Status DC


Acetaminophen (Tylenol) 500 mg 1X  ONCE PO  Last administered on 21at 

21:54;  Start 21 at 22:00;  Stop 21 at 22:01;  Status DC


Dexamethasone Sodium Phosphate (Decadron) 10 mg 1X  ONCE IVP  Last administered 

on 21at 21:55;  Start 21 at 22:00;  Stop 21 at 22:01;  Status DC


Ceftriaxone Sodium (Rocephin) 1 gm 1X  ONCE IVP ;  Start 21 at 22:30;  Stop

21 at 22:20;  Status DC


Levofloxacin/ Dextrose 150 ml @  100 mls/hr 1X  ONCE IV  Last administered on 

21at 00:22;  Start 21 at 23:00;  Stop 21 at 00:29;  Status DC


Piperacillin Sod/ Tazobactam Sod 4.5 gm/Sodium Chloride 100 ml @  200 mls/hr 1X 

ONCE IV  Last administered on 21at 00:15;  Start 21 at 23:00;  Stop 

21 at 23:29;  Status DC


Iohexol (Omnipaque 350 Mg/ml) 100 ml 1X  ONCE IV ;  Start 21 at 23:30;  

Stop 21 at 23:31;  Status DC


Info (CONTRAST GIVEN -- Rx MONITORING) 1 each PRN DAILY  PRN MC SEE COMMENTS;  

Start 21 at 23:15;  Stop 21 at 23:14;  Status DC


Morphine Sulfate (Morphine Sulfate) 4 mg 1X  ONCE IV  Last administered on 

21at 00:21;  Start 21 at 00:30;  Stop 21 at 00:31;  Status DC


Sodium Chloride 1,000 ml @  1,000 mls/hr 1X  ONCE IV  Last administered on 

21at 02:18;  Start 21 at 00:30;  Stop 21 at 01:29;  Status DC


Sodium Chloride 500 ml @  500 mls/hr 1X  ONCE IV  Last administered on 21at

03:20;  Start 21 at 00:30;  Stop 21 at 01:29;  Status DC


Ondansetron HCl (Zofran) 4 mg PRN Q8HRS  PRN IV NAUSEA/VOMITING 1ST CHOICE;  

Start 21 at 02:45;  Stop 21 at 02:44;  Status DC


Fentanyl Citrate (Fentanyl 2ml Vial) 50 mcg PRN Q2HRS  PRN IV SEVERE PAIN 7-10 

Last administered on 21at 03:21;  Start 21 at 02:45


Sodium Chloride 1,000 ml @  125 mls/hr Q8H IV  Last administered on 21at 

17:38;  Start 21 at 03:00;  Stop 21 at 02:59;  Status DC


Acetaminophen (Tylenol) 650 mg PRN Q4HRS  PRN PO FEVER > 100.3'F;  Start 21

at 02:45;  Stop 21 at 02:44;  Status DC


Insulin Human Lispro (HumaLOG) 0-5 UNITS TIDWMEALS SQ ;  Start 21 at 08:00;

 Stop 21 at 07:18;  Status DC


Dextrose (Dextrose 50%-Water Syringe) 12.5 gm PRN Q15MIN  PRN IV SEE COMMENTS;  

Start 21 at 02:45;  Status Cancel


Fluticasone/ Vilanterol (Breo Ellipta 100-25 Mcg) 1 puff DAILY INH  Last 

administered on 21at 08:43;  Start 21 at 09:00


Aspirin (Aspirin Chewable) 81 mg DAILY PO  Last administered on 21at 08:41;

 Start 21 at 09:00


Atorvastatin Calcium (Lipitor) 40 mg QHS PO  Last administered on  

21:11;  Start 21 at 21:00


Fluticasone Propionate (Flonase) 2 spray DAILY NS  Last administered on 

 08:42;  Start 21 at 09:00


Losartan Potassium (Cozaar) 50 mg DAILY PO  Last administered on  08:41

;  Start 21 at 09:00


Montelukast Sodium (Singulair) 10 mg QHS PO  Last administered on  

21:11;  Start 21 at 21:00


Trazodone HCl (Desyrel) 100 mg PRN QHS  PRN PO INSOMNIA Last administered on 

 21:04;  Start 21 at 07:15


Insulin Glargine (Lantus Syringe) 30 unit BID SQ  Last administered on 

21:15;  Start 21 at 09:00


Insulin Human Lispro (HumaLOG) 30 units TIDWMEALS SQ  Last administered on 

4/18/21at 17:24;  Start 21 at 08:00


Dextrose (Dextrose 50%-Water Syringe) 12.5 gm PRN Q15MIN  PRN IV SEE COMMENTS;  

Start 21 at 07:15


Dextrose (Iv Dextrose 5%) 250 ml PRN Q15MIN  PRN IV SEE COMMENTS;  Start 21

at 07:15;  Status Cancel


Insulin Human Lispro (HumaLOG) 0-9 UNITS TIDWMEALS SQ  Last administered on at 11:58;  Start 21 at 08:00


Ondansetron HCl (Zofran) 4 mg PRN Q6HRS  PRN IVP NAUSEA/VOMITING Last 

administered on 21at 08:41;  Start 21 at 07:15


Al Hydroxide/Mg Hydroxide (Mylanta Plus Xs) 30 ml PRN Q3HRS  PRN PO HEARTBURN / 

GAS;  Start 21 at 07:15


Calcium Carbonate/ Glycine (Tums) 500 mg PRN Q3HRS  PRN PO UPSET STOMACH;  Start

21 at 07:15


Morphine Sulfate (Morphine Sulfate) 2 mg PRN Q1HR  PRN IV PAIN;  Start 21 

at 07:15


Acetaminophen/ Hydrocodone Bitart (Lortab 5/325) 1 tab PRN Q4HRS  PRN PO MILD 

PAIN 1-3;  Start 21 at 07:15


Acetaminophen/ Hydrocodone Bitart (Lortab 5/325) 2 tab PRN Q4HRS  PRN PO 

MODERATE PAIN, SEVERE PAIN Last administered on 21at 01:36;  Start 21 

at 07:15


Acetaminophen (Tylenol) 650 mg PRN Q6HRS  PRN PO Headaches, Temp > 101.5F;  

Start 21 at 07:15


Magnesium Hydroxide (Milk Of Magnesia) 2,400 mg PRN Q12HR  PRN PO CONSTIPATION; 

Start 21 at 07:15


Bisacodyl (Dulcolax Supp) 10 mg PRN DAILY  PRN DE CONSTIPATION-2ND CHOICE;  

Start 21 at 07:15


Enoxaparin Sodium (Lovenox 40mg Syringe) 40 mg Q24H SQ ;  Start 21 at 

07:15;  Status UNV


Dexamethasone Sodium Phosphate (Decadron) 6 mg DAILY IVP  Last administered on 

4/15/21at 09:06;  Start 21 at 09:00;  Stop 4/15/21 at 13:05;  Status DC


Enoxaparin Sodium (Lovenox 60mg Syringe) 60 mg Q12HR SQ  Last administered on 

21at 21:11;  Start 21 at 09:00


Levofloxacin/ Dextrose 150 ml @  100 mls/hr Q24H IV ;  Start 21 at 21:00;  

Stop 21 at 10:00;  Status DC


Albuterol Sulfate (Ventolin Hfa) 1 puff PRN Q4HRS  PRN INH WHEEZING Last 

administered on 4/15/21at 09:05;  Start 21 at 09:30


Doxycycline Hyclate 100 mg/ Dextrose 100 ml @  50 mls/hr Q12HR IV ;  Start 

21 at 11:00;  Stop 21 at 12:17;  Status DC


Ceftriaxone Sodium (Rocephin) 2 gm Q24H IVP ;  Start 21 at 11:00;  Stop 

21 at 12:17;  Status DC


Piperacillin Sod/ Tazobactam Sod (Zosyn Per Pharmacy) 1 each PRN DAILY  PRN MC 

SEE COMMENTS;  Start 21 at 12:15;  Stop 21 at 13:50;  Status DC


Piperacillin Sod/ Tazobactam Sod 3.375 gm/Sodium Chloride 50 ml @  100 mls/hr 

Q6HRS IV  Last administered on 21at 11:51;  Start 21 at 13:00;  Stop 

21 at 12:04;  Status DC


Ceftriaxone Sodium (Rocephin) 2 gm Q24H IVP  Last administered on 21at 

13:00;  Start 21 at 13:00;  Stop 21 at 08:20;  Status DC


Lactobacillus Rhamnosus (Culturelle) 1 cap BID PO  Last administered on 

21at 21:11;  Start 21 at 21:00


Amoxicillin/ Clavulanate Potassium (Augmentin 875/ 125mg) 1 tab BID PO  Last 

administered on 21at 21:11;  Start 21 at 09:00


Hydralazine HCl (Apresoline Inj) 10 mg PRN Q4HRS  PRN IVP ELEVATED BP, SEE CO

MMENTS;  Start 21 at 12:00





Active Scripts


Active


Norco 5-325 Tablet (Acetaminophen/Hydrocodone Bitart) 1 Each Tablet 1 Tab PO PRN

Q6HRS PRN 5 Days


Pantoprazole Sodium  ** (Pantoprazole Sodium) 40 Mg Tablet.dr 40 Mg PO DAILYAC 

30 Days


Montelukast Sodium Tablet  ** (Montelukast Sodium) 10 Mg Tablet 10 Mg PO QHS 30 

Days


Cozaar ** (Losartan Potassium) 50 Mg Tablet 50 Mg PO DAILY 30 Days


Combivent Respimat Inhal (Ipratropium/Albuterol Sulfate) 4 Gm Aer.w.adap 2 Inh 

IH QID 30 Days


Prednisone 20 Mg Tablet 1 Tab PO DAILY 5 Days


Doxycycline Hyclate 100 Mg Tablet 100 Mg PO BID 7 Days


Sertraline Hcl 100 Mg Tablet 100 Mg PO DAILY 30 Days


Trazodone Hcl 100 Mg Tablet 100 Mg PO PRN QHS PRN 30 Days


Lantus Solostar (Insulin Glargine,Hum.rec.anlog) 100 Unit/1 Ml Insuln.pen 30 

Unit SQ BID 30 Days


Reported


Novolog (Insulin Aspart) 100 Unit/1 Ml Cartridge 30 Unit SQ TIDWMEALS


Atorvastatin Calcium 40 Mg Tablet 40 Mg PO QHS


Fluticasone Propionate Nasal Spray (Fluticasone Propionate) 16 Gm Spray.susp 2 

Spr ASIM DAILY


Aspirin 81 Mg Tab.chew 1 Tab PO DAILY


Vitals/I & O





Vital Sign - Last 24 Hours








 21





 08:41 08:42 11:00 15:00


 


Temp   98.0 97.6





   98.0 97.6


 


Pulse 73  99 87


 


Resp   20 16


 


B/P (MAP) 129/73  173/99 (123) 166/78 (107)


 


Pulse Ox   99 100


 


O2 Delivery  Nasal Cannula Nasal Cannula Nasal Cannula


 


O2 Flow Rate  3.0 3.0 3.0


 


    





    





 21





 17:25 19:00 20:10 23:00


 


Temp  98.1  97.5





  98.1  97.5


 


Pulse  89  88


 


Resp  20  20


 


B/P (MAP)  155/72 (99)  155/85 (108)


 


Pulse Ox  100  100


 


O2 Delivery Nasal Cannula Nasal Cannula Nasal Cannula Nasal Cannula


 


O2 Flow Rate  3.0 3.0 3.0


 


    





    





 21





 01:36 03:00 03:03 07:49


 


Temp  98.0  98.2





  98.0  98.2


 


Pulse  86  86


 


Resp 18 22 18 22


 


B/P (MAP)  124/88 (100)  135/68 (90)


 


Pulse Ox 100 99 100 99


 


O2 Delivery Nasal Cannula Nasal Cannula Nasal Cannula Nasal Cannula


 


O2 Flow Rate 3.0 3.0 3.0 3.0














Intake and Output   


 


 21





 15:00 23:00 07:00


 


Intake Total 240 ml 480 ml 


 


Output Total 1400 ml 1500 ml 850 ml


 


Balance -1160 ml -1020 ml -850 ml











Justicifation of Admission Dx:


Justifications for Admission:


Justification of Admission Dx:  Yes











CHRISSIE GAN MD          2021 08:36

## 2021-04-19 NOTE — PDOC
PULMONARY PROGRESS NOTES


DATE: 4/19/21 


TIME: 08:47


Subjective


Patient not more short of air, has clinical symptoms and signs of obstructive 

sleep apnea.


Vitals





Vital Signs








  Date Time  Temp Pulse Resp B/P (MAP) Pulse Ox O2 Delivery O2 Flow Rate FiO2


 


4/19/21 07:49 98.2 86 22 135/68 (90) 99 Nasal Cannula 3.0 





 98.2       








ROS:  No Nausea, No Chest Pain, No Abdominal Pain, No Increase Cough


General:  Alert, Oriented X4, No acute distress


Lungs:  Clear


Cardiovascular:  S1


Abdomen:  Soft, Other (obese)


Neuro Exam:  Alert


Extremities:  No Edema, Other (Some edema)


Labs





Laboratory Tests








Test


 4/17/21


12:04 4/17/21


16:55 4/17/21


19:51 4/18/21


03:00


 


Glucose (Fingerstick)


 78 mg/dL


(70-99) 124 mg/dL


(70-99) 190 mg/dL


(70-99) 





 


White Blood Count


 


 


 


 12.9 x10^3/uL


(4.0-11.0)


 


Red Blood Count


 


 


 


 4.77 x10^6/uL


(4.30-5.70)


 


Hemoglobin


 


 


 


 11.8 g/dL


(13.0-17.5)


 


Hematocrit


 


 


 


 36.3 %


(39.0-53.0)


 


Mean Corpuscular Volume    76 fL () 


 


Mean Corpuscular Hemoglobin    25 pg (25-35) 


 


Mean Corpuscular Hemoglobin


Concent 


 


 


 33 g/dL


(31-37)


 


Red Cell Distribution Width


 


 


 


 15.1 %


(11.5-14.5)


 


Platelet Count


 


 


 


 379 x10^3/uL


(140-400)


 


Neutrophils (%) (Auto)    75 % (31-73) 


 


Lymphocytes (%) (Auto)    14 % (24-48) 


 


Monocytes (%) (Auto)    9 % (0-9) 


 


Eosinophils (%) (Auto)    2 % (0-3) 


 


Basophils (%) (Auto)    0 % (0-3) 


 


Neutrophils # (Auto)


 


 


 


 9.6 x10^3/uL


(1.8-7.7)


 


Lymphocytes # (Auto)


 


 


 


 1.8 x10^3/uL


(1.0-4.8)


 


Monocytes # (Auto)


 


 


 


 1.2 x10^3/uL


(0.0-1.1)


 


Eosinophils # (Auto)


 


 


 


 0.2 x10^3/uL


(0.0-0.7)


 


Basophils # (Auto)


 


 


 


 0.0 x10^3/uL


(0.0-0.2)


 


Sodium Level


 


 


 


 140 mmol/L


(136-145)


 


Potassium Level


 


 


 


 4.2 mmol/L


(3.5-5.1)


 


Chloride Level


 


 


 


 101 mmol/L


()


 


Carbon Dioxide Level


 


 


 


 28 mmol/L


(21-32)


 


Anion Gap    11 (6-14) 


 


Blood Urea Nitrogen


 


 


 


 14 mg/dL


(8-26)


 


Creatinine


 


 


 


 0.9 mg/dL


(0.7-1.3)


 


Estimated GFR


(Cockcroft-Gault) 


 


 


 112.0 





 


Glucose Level


 


 


 


 125 mg/dL


(70-99)


 


Calcium Level


 


 


 


 7.9 mg/dL


(8.5-10.1)


 


Test


 4/18/21


07:34 4/18/21


11:39 4/18/21


16:55 4/18/21


19:41


 


Glucose (Fingerstick)


 116 mg/dL


(70-99) 185 mg/dL


(70-99) 116 mg/dL


(70-99) 193 mg/dL


(70-99)


 


Test


 4/19/21


07:42 


 


 





 


Glucose (Fingerstick)


 158 mg/dL


(70-99) 


 


 











Laboratory Tests








Test


 4/18/21


11:39 4/18/21


16:55 4/18/21


19:41 4/19/21


07:42


 


Glucose (Fingerstick)


 185 mg/dL


(70-99) 116 mg/dL


(70-99) 193 mg/dL


(70-99) 158 mg/dL


(70-99)








Medications





Active Scripts








 Medications  Dose


 Route/Sig


 Max Daily Dose Days Date Category


 


 Novolog (Insulin


 Aspart) 100


 Unit/1 Ml


 Cartridge  30 Unit


 SQ TIDWMEALS


   4/13/21 Reported


 


 Norco 5-325


 Tablet


  (Acetaminophen/Hydrocodone


 Bitart) 1 Each


 Tablet  1 Tab


 PO PRN Q6HRS PRN


  5 1/5/21 Rx


 


 Pantoprazole


 Sodium  **


  (Pantoprazole


 Sodium) 40 Mg


 Tablet.dr  40 Mg


 PO DAILYAC


  30 1/5/21 Rx


 


 Montelukast


 Sodium Tablet  **


  (Montelukast


 Sodium) 10 Mg


 Tablet  10 Mg


 PO QHS


  30 1/5/21 Rx


 


 Cozaar **


  (Losartan


 Potassium) 50 Mg


 Tablet  50 Mg


 PO DAILY


  30 1/5/21 Rx


 


 Combivent


 Respimat Inhal


  (Ipratropium/Albuterol


 Sulfate) 4 Gm


 Aer.w.adap  2 Inh


 IH QID


  30 1/5/21 Rx


 


 Prednisone 20 Mg


 Tablet  1 Tab


 PO DAILY


  5 1/5/21 Rx


 


 Doxycycline


 Hyclate 100 Mg


 Tablet  100 Mg


 PO BID


  7 1/5/21 Rx


 


 Sertraline Hcl


 100 Mg Tablet  100 Mg


 PO DAILY


  30 1/5/21 Rx


 


 Trazodone Hcl 100


 Mg Tablet  100 Mg


 PO PRN QHS PRN


  30 1/5/21 Rx


 


 Lantus Solostar


  (Insulin


 Glargine,Hum.rec.anlog)


 100 Unit/1 Ml


 Insuln.pen  30 Unit


 SQ BID


  30 1/5/21 Rx


 


 Atorvastatin


 Calcium 40 Mg


 Tablet  40 Mg


 PO QHS


   3/20/19 Reported


 


 Fluticasone


 Propionate Nasal


 Spray


  (Fluticasone


 Propionate) 16 Gm


 Spray.susp  2 Spr


 ASIM DAILY


   3/20/19 Reported


 


 Aspirin 81 Mg


 Tab.chew  1 Tab


 PO DAILY


   1/25/16 Reported











Impression


.


IMPRESSION:


1.  Acute hypoxic respiratory failure with high-grade fever.  No definite


infiltrates seen on the CT chest.  


2.  No significant tobacco history.


3.  Morbid obesity with underlying sleep apnea. 


4.  Influenza screen negative. 


5.  Leukocytosis.


6.  SARS-CoV-2 negative


7.  UTI, per PCP


8. Sepsis from gram-negative bacteremia.


9. Proteus Mirabella's bacteremia.











------------------------------------------

--------------------------------------------------





  URINE CULTURE  Final  


        Final





        GREATER THAN 100,000 CFU/ML GRAM NEGATIVE RODS on 04/14/21


        at 0821


        FINAL ID= [PROTEUS MIRABILIS]


                           Testing Performed by:


                         [address] [address]


          For Inquires, the Physician may contact the Microbiology


                        department at [phone]


        PROTEUS MIRABILIS





Plan


.


Updated 4/19 


Okay to discharge from my standpoint of view


Outpatient polysomnogram


Antibiotics per ID


GI following up on C. difficile, and LFT








updated 4/18


titrate oxygen to keep sats 90%,  6 min walk at DC


SARS-CoV-2 negative


Continue ABX per ID changed to po


Educated on importance of weight loss 


Pt. would benefit from out patient sleep study   [place] the importance of diagnosis

tx discussed


advised to lose wt and exercise


DVT/GI PPX 


D/W RN 








Updated 4/15


SARS-CoV-2 negative, continue current support


Discontinue steroids


DVT GI prophylaxis


Antibiotics per ID


DVT GI prophylax











MANFRED RUTLEDGE MD              Apr 19, 2021 08:48

## 2021-04-19 NOTE — NUR
SW following for discharge planning. SW spoke with RN and reviewed chart. Pt will likely 
discharge home self-care today, 4/19. Pt on PO Augmentin. Spoke with RT. 6 min walk results 
indicated no home 02 setup needed. No anticipated SW needs on discharge.  

-------------------------------------------------------------------------------

Addendum: 04/20/21 at 1618 by JOSY BATRES

-------------------------------------------------------------------------------

Discharge held 4/19. Pt discharge home today, 4/20 self-care. No further SW needs at this 
time.

## 2021-04-20 VITALS — SYSTOLIC BLOOD PRESSURE: 152 MMHG | DIASTOLIC BLOOD PRESSURE: 78 MMHG

## 2021-04-20 VITALS — SYSTOLIC BLOOD PRESSURE: 129 MMHG | DIASTOLIC BLOOD PRESSURE: 67 MMHG

## 2021-04-20 VITALS — DIASTOLIC BLOOD PRESSURE: 70 MMHG | SYSTOLIC BLOOD PRESSURE: 134 MMHG

## 2021-04-20 RX ADMIN — INSULIN LISPRO SCH UNITS: 100 INJECTION, SOLUTION INTRAVENOUS; SUBCUTANEOUS at 12:13

## 2021-04-20 RX ADMIN — INSULIN LISPRO SCH UNITS: 100 INJECTION, SOLUTION INTRAVENOUS; SUBCUTANEOUS at 11:45

## 2021-04-20 RX ADMIN — PANTOPRAZOLE SODIUM SCH MG: 40 TABLET, DELAYED RELEASE ORAL at 08:12

## 2021-04-20 RX ADMIN — AMOXICILLIN AND CLAVULANATE POTASSIUM SCH TAB: 875; 125 TABLET, FILM COATED ORAL at 08:12

## 2021-04-20 RX ADMIN — ASPIRIN 81 MG SCH MG: 81 TABLET ORAL at 08:09

## 2021-04-20 RX ADMIN — Medication SCH CAP: at 08:09

## 2021-04-20 RX ADMIN — ENOXAPARIN SODIUM SCH MG: 100 INJECTION SUBCUTANEOUS at 08:13

## 2021-04-20 RX ADMIN — INSULIN LISPRO SCH UNITS: 100 INJECTION, SOLUTION INTRAVENOUS; SUBCUTANEOUS at 08:00

## 2021-04-20 RX ADMIN — INSULIN GLARGINE SCH UNIT: 100 INJECTION, SOLUTION SUBCUTANEOUS at 09:00

## 2021-04-20 RX ADMIN — HYDROCODONE BITARTRATE AND ACETAMINOPHEN PRN TAB: 5; 325 TABLET ORAL at 08:13

## 2021-04-20 RX ADMIN — FLUTICASONE FUROATE AND VILANTEROL TRIFENATATE SCH PUFF: 100; 25 POWDER RESPIRATORY (INHALATION) at 08:14

## 2021-04-20 RX ADMIN — FLUTICASONE PROPIONATE SCH SPRAY: 50 SPRAY, METERED NASAL at 08:11

## 2021-04-20 RX ADMIN — INSULIN LISPRO SCH UNITS: 100 INJECTION, SOLUTION INTRAVENOUS; SUBCUTANEOUS at 08:29

## 2021-04-20 RX ADMIN — LOSARTAN POTASSIUM SCH MG: 50 TABLET ORAL at 08:18

## 2021-04-20 NOTE — NUR
Patient discharged home per w/c to family.  Patient has received discharge instructions, and 
verbalized understanding of same.  Prescriptions were sent electronically to patient 
pharmacy, nurse confirmed receipt.  Patient belongings from room taken with him, double 
check of room made for belongings.

## 2021-04-20 NOTE — PDOC
Infectious Disease Note


Subjective:


Subjective


Patient feels better


Diarrhea has resolved





Vital Signs:


Vital Signs





Vital Signs








  Date Time  Temp Pulse Resp B/P (MAP) Pulse Ox O2 Delivery O2 Flow Rate FiO2


 


4/20/21 08:18  94  152/78    


 


4/20/21 08:13   20  96 Room Air  


 


4/20/21 07:00 98.4      3.0 





 98.4       











Physical Exam:


PHYSICAL EXAM


GENERAL:  Alert, oriented x 3, morbidly obese male, nontoxic appearing, lying in


bed comfortably,


HEENT:  Normocephalic, atraumatic.  Anicteric.  No thrush.


NECK:  Fullness present.


LUNGS: Decreased breath sounds bilaterally


HEART:  S1, S2.


ABDOMEN:  Morbidly obese.  Bowel sounds present, no rebound or guarding


EXTREMITIES:  No edema, no cyanosis.


DERMATOLOGIC:  Warm and dry.  No generalized rash.


NEUROLOGIC:  Alert, oriented x 3, grossly nonfocal.


PSYCHIATRIC:  Cooperative, appropriate mood and affect.





Medications:


Inpatient Meds:


Medications reviewed.





Labs:


Lab





Laboratory Tests








Test


 4/19/21


11:15 4/19/21


16:21 4/19/21


20:05 4/19/21


23:49


 


Glucose (Fingerstick)


 105 mg/dL


(70-99) 101 mg/dL


(70-99) 126 mg/dL


(70-99) 62 mg/dL


(70-99)


 


Test


 4/20/21


00:13 4/20/21


07:33 


 





 


Glucose (Fingerstick)


 73 mg/dL


(70-99) 137 mg/dL


(70-99) 


 














Objective:


Assessment:





1.  Sepsis from gram-negative bacteremia.


2.  Proteus Mirabella's bacteremia.


3.  Fever.


4.  Leukocytosis.


5.  Lactic acidosis.


6.  Proteus mirabilis urinary tract infection.


7.  Nausea.


8.  Morbid obesity.


9.  Diabetes mellitus type 2.


10.  Covid PCR negative


11.  Cholelithiasis


12.  Hypoxic respiratory failure, possible BC





Plan:


Plan of Care


 


Continue Augmentin for 7 days


Probiotics


C. difficile if patient has recurrence of diarrhea


Trend WBC


Continue supportive care











NICA MINA MD           Apr 20, 2021 08:48

## 2021-04-20 NOTE — DISCH
DISCHARGE INSTRUCTIONS


Condition on Discharge


Condition on Discharge:  Stable





Activity After Discharge


Activity Instructions for Disc:  Resume previous activity, Activity as tolerated


Exercise Instruction after Dis:  Progress as tolerated


Driving Instructions after Dis:  Do not drive today


Weight Bearing Status after Di:  As tolerated





Diet after Discharge


Diet after Discharge:  Diabetic No Calorie Level


Liquid Texture:  Thin Liquid





Wound Incision Care


Wound/Incision Care:  No wound care needed





Checks after Discharge


Checks after discharge:  Check blood press - daily, Check blood sugar, ac/hs, 

Weigh Yourself Daily





Contacting the DR. after DC


Call your doctor for:  If your condition worsens





Follow-Up


Follow up with:  pcp this week





Treatment/Equipment after DC


Adaptive Equipment Issued:  None











CHRISSIE GAN MD          Apr 20, 2021 11:04

## 2021-04-20 NOTE — PDOC
PULMONARY PROGRESS NOTES


DATE: 4/20/21 


TIME: 09:23


Subjective


Patient not more short of air, has clinical symptoms and signs of obstructive 

sleep apnea.


Vitals





Vital Signs








  Date Time  Temp Pulse Resp B/P (MAP) Pulse Ox O2 Delivery O2 Flow Rate FiO2


 


4/20/21 08:18  94  152/78    


 


4/20/21 08:13   20  96 Room Air  


 


4/20/21 07:00 98.4      3.0 





 98.4       








ROS:  No Nausea, No Chest Pain, No Abdominal Pain, No Increase Cough


General:  Alert, Oriented X4, No acute distress


Lungs:  Clear


Cardiovascular:  S1


Abdomen:  Soft, Other (obese)


Neuro Exam:  Alert


Extremities:  No Edema, Other (Some edema)


Labs





Laboratory Tests








Test


 4/18/21


11:39 4/18/21


16:55 4/18/21


19:41 4/19/21


07:42


 


Glucose (Fingerstick)


 185 mg/dL


(70-99) 116 mg/dL


(70-99) 193 mg/dL


(70-99) 158 mg/dL


(70-99)


 


Test


 4/19/21


08:20 4/19/21


11:15 4/19/21


16:21 4/19/21


20:05


 


White Blood Count


 14.0 x10^3/uL


(4.0-11.0) 


 


 





 


Red Blood Count


 5.00 x10^6/uL


(4.30-5.70) 


 


 





 


Hemoglobin


 12.2 g/dL


(13.0-17.5) 


 


 





 


Hematocrit


 38.4 %


(39.0-53.0) 


 


 





 


Mean Corpuscular Volume 77 fL ()    


 


Mean Corpuscular Hemoglobin 24 pg (25-35)    


 


Mean Corpuscular Hemoglobin


Concent 32 g/dL


(31-37) 


 


 





 


Red Cell Distribution Width


 15.3 %


(11.5-14.5) 


 


 





 


Platelet Count


 444 x10^3/uL


(140-400) 


 


 





 


Neutrophils (%) (Auto) 74 % (31-73)    


 


Lymphocytes (%) (Auto) 16 % (24-48)    


 


Monocytes (%) (Auto) 7 % (0-9)    


 


Eosinophils (%) (Auto) 2 % (0-3)    


 


Basophils (%) (Auto) 0 % (0-3)    


 


Neutrophils # (Auto)


 10.4 x10^3/uL


(1.8-7.7) 


 


 





 


Lymphocytes # (Auto)


 2.2 x10^3/uL


(1.0-4.8) 


 


 





 


Monocytes # (Auto)


 1.0 x10^3/uL


(0.0-1.1) 


 


 





 


Eosinophils # (Auto)


 0.3 x10^3/uL


(0.0-0.7) 


 


 





 


Basophils # (Auto)


 0.0 x10^3/uL


(0.0-0.2) 


 


 





 


Sodium Level


 138 mmol/L


(136-145) 


 


 





 


Potassium Level


 4.5 mmol/L


(3.5-5.1) 


 


 





 


Chloride Level


 99 mmol/L


() 


 


 





 


Carbon Dioxide Level


 30 mmol/L


(21-32) 


 


 





 


Anion Gap 9 (6-14)    


 


Blood Urea Nitrogen


 11 mg/dL


(8-26) 


 


 





 


Creatinine


 0.8 mg/dL


(0.7-1.3) 


 


 





 


Estimated GFR


(Cockcroft-Gault) 128.3 


 


 


 





 


Glucose Level


 162 mg/dL


(70-99) 


 


 





 


Calcium Level


 8.3 mg/dL


(8.5-10.1) 


 


 





 


Iron Level


 49 ug/dL


() 


 


 





 


Total Iron Binding Capacity


 232 ug/dL


(250-450) 


 


 





 


Iron Saturation 21 % (15-34)    


 


Total Bilirubin


 0.3 mg/dL


(0.2-1.0) 


 


 





 


Direct Bilirubin


 0.1 mg/dL


(0.0-0.2) 


 


 





 


Aspartate Amino Transf


(AST/SGOT) 32 U/L (15-37) 


 


 


 





 


Alanine Aminotransferase


(ALT/SGPT) 68 U/L (16-63) 


 


 


 





 


Alkaline Phosphatase


 130 U/L


() 


 


 





 


Total Protein


 7.8 g/dL


(6.4-8.2) 


 


 





 


Albumin


 2.6 g/dL


(3.4-5.0) 


 


 





 


Glucose (Fingerstick)


 


 105 mg/dL


(70-99) 101 mg/dL


(70-99) 126 mg/dL


(70-99)


 


Test


 4/19/21


23:49 4/20/21


00:13 4/20/21


07:33 





 


Glucose (Fingerstick)


 62 mg/dL


(70-99) 73 mg/dL


(70-99) 137 mg/dL


(70-99) 











Laboratory Tests








Test


 4/19/21


11:15 4/19/21


16:21 4/19/21


20:05 4/19/21


23:49


 


Glucose (Fingerstick)


 105 mg/dL


(70-99) 101 mg/dL


(70-99) 126 mg/dL


(70-99) 62 mg/dL


(70-99)


 


Test


 4/20/21


00:13 4/20/21


07:33 


 





 


Glucose (Fingerstick)


 73 mg/dL


(70-99) 137 mg/dL


(70-99) 


 











Medications





Active Scripts








 Medications  Dose


 Route/Sig


 Max Daily Dose Days Date Category


 


 Novolog (Insulin


 Aspart) 100


 Unit/1 Ml


 Cartridge  30 Unit


 SQ TIDWMEALS


   4/13/21 Reported


 


 Norco 5-325


 Tablet


  (Acetaminophen/Hydrocodone


 Bitart) 1 Each


 Tablet  1 Tab


 PO PRN Q6HRS PRN


  5 1/5/21 Rx


 


 Pantoprazole


 Sodium  **


  (Pantoprazole


 Sodium) 40 Mg


 Tablet.dr  40 Mg


 PO DAILYAC


  30 1/5/21 Rx


 


 Montelukast


 Sodium Tablet  **


  (Montelukast


 Sodium) 10 Mg


 Tablet  10 Mg


 PO QHS


  30 1/5/21 Rx


 


 Cozaar **


  (Losartan


 Potassium) 50 Mg


 Tablet  50 Mg


 PO DAILY


  30 1/5/21 Rx


 


 Combivent


 Respimat Inhal


  (Ipratropium/Albuterol


 Sulfate) 4 Gm


 Aer.w.adap  2 Inh


 IH QID


  30 1/5/21 Rx


 


 Prednisone 20 Mg


 Tablet  1 Tab


 PO DAILY


  5 1/5/21 Rx


 


 Doxycycline


 Hyclate 100 Mg


 Tablet  100 Mg


 PO BID


  7 1/5/21 Rx


 


 Sertraline Hcl


 100 Mg Tablet  100 Mg


 PO DAILY


  30 1/5/21 Rx


 


 Trazodone Hcl 100


 Mg Tablet  100 Mg


 PO PRN QHS PRN


  30 1/5/21 Rx


 


 Lantus Solostar


  (Insulin


 Glargine,Hum.rec.anlog)


 100 Unit/1 Ml


 Insuln.pen  30 Unit


 SQ BID


  30 1/5/21 Rx


 


 Atorvastatin


 Calcium 40 Mg


 Tablet  40 Mg


 PO QHS


   3/20/19 Reported


 


 Fluticasone


 Propionate Nasal


 Spray


  (Fluticasone


 Propionate) 16 Gm


 Spray.susp  2 Spr


 ASIM DAILY


   3/20/19 Reported


 


 Aspirin 81 Mg


 Tab.chew  1 Tab


 PO DAILY


   1/25/16 Reported











Impression


.


IMPRESSION:


1.  Acute hypoxic respiratory failure with high-grade fever.  No definite


infiltrates seen on the CT chest.  


2.  No significant tobacco history.


3.  Morbid obesity with underlying sleep apnea. 


4.  Influenza screen negative. 


5.  Leukocytosis.


6.  SARS-CoV-2 negative


7.  UTI, per PCP


8. Sepsis from gram-negative bacteremia.


9. Proteus Mirabella's bacteremia.











---------------------

-----------------------------------------------------------------------





  URINE CULTURE  Final  


        Final





        GREATER THAN 100,000 CFU/ML GRAM NEGATIVE RODS on 04/14/21


        at 0821


        FINAL ID= [PROTEUS MIRABILIS]


                           Testing Performed by:


                         [address] [address]


          For Inquires, the Physician may contact the Microbiology


                        department at [phone]


        PROTEUS MIRABILIS





Plan


.


Updated 4/19 


Okay to discharge from my standpoint of view


Outpatient polysomnogram


Antibiotics per ID


GI following up on C. difficile, and LFT








updated 4/18


titrate oxygen to keep sats 90%,  6 min walk at DC


SARS-CoV-2 negative


Continue ABX per ID changed to po


Educated on importance of weight loss 


Pt. would benefit from out patient sleep study   [place] the importance of diagnosis

tx discussed


advised to lose wt and exercise


DVT/GI PPX 


D/W RN 








Updated 4/15


SARS-CoV-2 negative, continue current support


Discontinue steroids


DVT GI prophylaxis


Antibiotics per ID


DVT GI prophylax











MANFRED RUTLEDGE MD              Apr 20, 2021 09:23

## 2021-04-20 NOTE — PDOC
Date of Service:


DATE: 4/20/21 


TIME: 13:11





Subjective:


Subjective:


Diarrhea is better.


Right-sided pain w/ movement and breathing.


Tolerating diet.





Objective:


Vital Signs:





                                   Vital Signs








  Date Time  Temp Pulse Resp B/P (MAP) Pulse Ox O2 Delivery O2 Flow Rate FiO2


 


4/20/21 11:00 98.5 95 20 129/67 (87) 96 Room Air  





 98.5       


 


4/20/21 09:13       3.0 








Labs:





Laboratory Tests








Test


 4/19/21


16:21 4/19/21


20:05 4/19/21


23:49 4/20/21


00:13


 


Glucose (Fingerstick)


 101 mg/dL


(70-99) 126 mg/dL


(70-99) 62 mg/dL


(70-99) 73 mg/dL


(70-99)


 


Test


 4/20/21


07:33 4/20/21


10:11 


 





 


Glucose (Fingerstick)


 137 mg/dL


(70-99) 123 mg/dL


(70-99) 


 














PE:





GEN: NAD


LUNGS: diminished anteriorly


HEART: RRR


ABD: obese, non-tender


NEURO/PSYCH: A & O 3





A/P:


Bacteremia, UTI


Diarrhea - improved, C Diff uncollected


ACD


GERD





--


Has DC orders - okay per GI.  Would send on PPI.


Consider outpt EGD and colonoscopy for GERD and FH colon cancer.





Justicifation of Admission Dx:


Justifications for Admission:


Justification of Admission Dx:  Yes











ODESSA SKELTON         Apr 20, 2021 13:15

## 2021-04-20 NOTE — PDOC
PROGRESS NOTES


Date of Service:


DATE: 4/20/21 


TIME: 08:36





Chief Complaint


Chief Complaint





IMPRESSION =====================








Sepsis


Acute respiratory failure with hypoxia REMAINS ON 3 LITERS NC 


Right pyelonephritis


DM2 with hyperglycemia


Moderate malnutrition


COVID-19 PUI


Sepsis from gram-negative bacteremia.


Proteus Mirabella's bacteremia.


super morbid obesity

















Plan:





Patient received fluids and broad-spectrum antibiotics and the ED.


Will continue treatment of asthma exacerbation and pyelonephritis with Rocephin 

daily and doxycycline IV twice daily.


Consultation to pulmonology acute hypoxia COVID-19 PUI


Treat empirically with Decadron 6 mg daily


COVID-19 and influenza NEG 


Basal/prandial insulin; A1c pending.


Resume home medications


FEN - Cardiac diet


PPX  - Lovenox


FULL CODE


Continue Augmentin


Check C. difficile PCR ASAP


Trend WBC 14 K today


Probiotics


May need repeat imaging


Dispo - inpatient for above; patient names his girlfriend as his surrogate 

decision-maker.











4/20 /2021





Afebrile, some hypertension 4-18    Breathing on 3 L nasal cannula; apparently i

s waiting for home CPAP machine.  Will obtain 6-minute walk  TODAY ON RA   Will 

discharge  on Augmentin twice daily to complete a 2-week course.


3 LARGE WATERY FOUL SMELLING STOOLS YESTERDAY NONE TODAY, C DIFF NOT SENT due to

formed stools








d/c planning 33  min











4/19/2021





Afebrile, some hypertension YESTERDAY   Breathing on 3 L nasal cannula; 

apparently is waiting for home CPAP machine.  Will obtain 6-minute walk OL TODAY

ON RA   Will discharge  on Augmentin twice daily to complete a 2-week course.


3 LARGE WATERY FOUL SMELLING STOOLS TODAY SINCE 3 AM





History of Present Illness


History of Present Illness


Patient is a 40 old male with past medical history asthma, hypertension, DM2, 

presents to the ER with complaints of worsening shortness of breath over the 

past 2 days.  Reports associated nausea, vomiting, diarrhea, body aches, fever, 

chills, and polyuria.  He has been taking his home albuterol inhaler, symptoms 

acutely worsened when he ran out with this medication.  Upon arrival to the ED 

he was tachypnea, febrile, saturating 99% on 5 L nasal cannula.  CT 

chest/abdomen/pelvis obtained on admission showed subtle right perinephric 

stranding; gallstones, no PE or chest abnormality.  Will admit patient for 

further medical management.





4/18/2021


Afebrile, some hypertension this morning.  Breathing on 3 L nasal cannula; 

apparently is waiting for home CPAP machine.  Will obtain 6-minute walk prior to

discharge tomorrow.  Will discharge tomorrow on Augmentin twice daily to 

complete a 2-week course.





4/17/2021


Afebrile.  Feeling well today.  Speaking to me on room air at the time of my 

evaluation.  Continue IV Rocephin.  Anticipate discharge Monday on cefdinir.





4/16/2021


Patient seen and evaluated bedside.  States he feels better, denies fever.  

Denies chest pain or diarrhea.  Discussed with ID, will continue on IV Rocephin 

for now, and likely switch to cefdinir 100 mg p.o. twice daily for 10 days when 

ready to discharge likely on Monday.  At that time will obtain 6-minute walk. 

Still breathing on 2 L nasal cannula; will need 6-minute walk prior to discharge

due to suspected obesity hypoventilation syndrome or BC.





4/15/2021


Patient seen and evaluated bedside.  He is afebrile, currently breathing on 2 L 

nasal cannula.  He is COVID-19 negative.  Urine and blood cultures positive for 

Proteus mirabilis; sensitivities largely pansensitive except resistant to nitro

furantoin and tetracycline..  Will continue treatment of his pyelonephritis with

Zosyn, per ID.  He still complains of epigastric pain pain with deep 

inspiration, and flank pain.  Some right upper quadrant tenderness on exam, he 

does report some pain after eating.  On admission CT did show cholelithiasis.  

Will obtain ultrasound to rule out cholecystitis.  Discussed with RN.





4/14/2021


Afebrile, currently breathing on 2 L nasal cannula.  He denies any sensation of 

shortness of breath.  Discussed with Dr. Yun, will concern for COVID-19 based 

on CT results.  COVID-19 test still pending at this time.  Urine cultures 

positive for Proteus mirabilis; blood cultures positive for Proteus mirabilis as

well.  Continue treatment for pyelonephritis with Zosyn, per ID.  Will follow 

sensitivities.  If COVID-19 positive, will initiate remdesivir.





Vitals


Vitals





Vital Signs








  Date Time  Temp Pulse Resp B/P (MAP) Pulse Ox O2 Delivery O2 Flow Rate FiO2


 


4/20/21 08:18  94  152/78    


 


4/20/21 08:13   20  96 Room Air  


 


4/20/21 07:00 98.4      3.0 





 98.4       











Physical Exam


Physical Exam


GENERAL:  Alert, oriented x 3, morbidly obese male, nontoxic appearing, lying in


bed comfortably,


HEENT:  Normocephalic, atraumatic.  Anicteric.  No thrush.


NECK:  Fullness present.


LUNGS: Decreased breath sounds bilaterally


HEART:  S1, S2.


ABDOMEN:  Morbidly obese.  Bowel sounds present, no rebound or guarding


EXTREMITIES:  No edema, no cyanosis.


DERMATOLOGIC:  Warm and dry.  No generalized rash.


NEUROLOGIC:  Alert, oriented x 3, grossly nonfocal.


PSYCHIATRIC:  Cooperative, appropriate mood and affect.


General:  Alert, Oriented X3, Cooperative, No acute distress


Heart:  Regular rate, Normal S1, Normal S2


Lungs:  Clear


Abdomen:  Normal bowel sounds, Soft, No tenderness, Other (Right upper quadrant 

tenderness resolved  , very obese)


Extremities:  No clubbing, No cyanosis


Skin:  No rashes





Labs


LABS





Laboratory Tests








Test


 4/19/21


11:15 4/19/21


16:21 4/19/21


20:05 4/19/21


23:49


 


Glucose (Fingerstick)


 105 mg/dL


(70-99) 101 mg/dL


(70-99) 126 mg/dL


(70-99) 62 mg/dL


(70-99)


 


Test


 4/20/21


00:13 4/20/21


07:33 


 





 


Glucose (Fingerstick)


 73 mg/dL


(70-99) 137 mg/dL


(70-99) 


 














Assessment and Plan


Assessmemt and Plan


Problems


Medical Problems:


(1) Hypomagnesemia


Status: Acute  





(2) Pyelonephritis


Status: Acute  





(3) Septic shock


Status: Acute  





(4) Suspected 2019 novel coronavirus infection


Status: Acute  











Comment


Review of Relevant


I have reviewed the following items martha (where applicable) has been applied.


Labs





Laboratory Tests








Test


 4/18/21


11:39 4/18/21


16:55 4/18/21


19:41 4/19/21


07:42


 


Glucose (Fingerstick)


 185 mg/dL


(70-99) 116 mg/dL


(70-99) 193 mg/dL


(70-99) 158 mg/dL


(70-99)


 


Test


 4/19/21


08:20 4/19/21


11:15 4/19/21


16:21 4/19/21


20:05


 


White Blood Count


 14.0 x10^3/uL


(4.0-11.0) 


 


 





 


Red Blood Count


 5.00 x10^6/uL


(4.30-5.70) 


 


 





 


Hemoglobin


 12.2 g/dL


(13.0-17.5) 


 


 





 


Hematocrit


 38.4 %


(39.0-53.0) 


 


 





 


Mean Corpuscular Volume 77 fL ()    


 


Mean Corpuscular Hemoglobin 24 pg (25-35)    


 


Mean Corpuscular Hemoglobin


Concent 32 g/dL


(31-37) 


 


 





 


Red Cell Distribution Width


 15.3 %


(11.5-14.5) 


 


 





 


Platelet Count


 444 x10^3/uL


(140-400) 


 


 





 


Neutrophils (%) (Auto) 74 % (31-73)    


 


Lymphocytes (%) (Auto) 16 % (24-48)    


 


Monocytes (%) (Auto) 7 % (0-9)    


 


Eosinophils (%) (Auto) 2 % (0-3)    


 


Basophils (%) (Auto) 0 % (0-3)    


 


Neutrophils # (Auto)


 10.4 x10^3/uL


(1.8-7.7) 


 


 





 


Lymphocytes # (Auto)


 2.2 x10^3/uL


(1.0-4.8) 


 


 





 


Monocytes # (Auto)


 1.0 x10^3/uL


(0.0-1.1) 


 


 





 


Eosinophils # (Auto)


 0.3 x10^3/uL


(0.0-0.7) 


 


 





 


Basophils # (Auto)


 0.0 x10^3/uL


(0.0-0.2) 


 


 





 


Sodium Level


 138 mmol/L


(136-145) 


 


 





 


Potassium Level


 4.5 mmol/L


(3.5-5.1) 


 


 





 


Chloride Level


 99 mmol/L


() 


 


 





 


Carbon Dioxide Level


 30 mmol/L


(21-32) 


 


 





 


Anion Gap 9 (6-14)    


 


Blood Urea Nitrogen


 11 mg/dL


(8-26) 


 


 





 


Creatinine


 0.8 mg/dL


(0.7-1.3) 


 


 





 


Estimated GFR


(Cockcroft-Gault) 128.3 


 


 


 





 


Glucose Level


 162 mg/dL


(70-99) 


 


 





 


Calcium Level


 8.3 mg/dL


(8.5-10.1) 


 


 





 


Iron Level


 49 ug/dL


() 


 


 





 


Total Iron Binding Capacity


 232 ug/dL


(250-450) 


 


 





 


Iron Saturation 21 % (15-34)    


 


Total Bilirubin


 0.3 mg/dL


(0.2-1.0) 


 


 





 


Direct Bilirubin


 0.1 mg/dL


(0.0-0.2) 


 


 





 


Aspartate Amino Transf


(AST/SGOT) 32 U/L (15-37) 


 


 


 





 


Alanine Aminotransferase


(ALT/SGPT) 68 U/L (16-63) 


 


 


 





 


Alkaline Phosphatase


 130 U/L


() 


 


 





 


Total Protein


 7.8 g/dL


(6.4-8.2) 


 


 





 


Albumin


 2.6 g/dL


(3.4-5.0) 


 


 





 


Glucose (Fingerstick)


 


 105 mg/dL


(70-99) 101 mg/dL


(70-99) 126 mg/dL


(70-99)


 


Test


 4/19/21


23:49 4/20/21


00:13 4/20/21


07:33 





 


Glucose (Fingerstick)


 62 mg/dL


(70-99) 73 mg/dL


(70-99) 137 mg/dL


(70-99) 











Laboratory Tests








Test


 4/19/21


11:15 4/19/21


16:21 4/19/21


20:05 4/19/21


23:49


 


Glucose (Fingerstick)


 105 mg/dL


(70-99) 101 mg/dL


(70-99) 126 mg/dL


(70-99) 62 mg/dL


(70-99)


 


Test


 4/20/21


00:13 4/20/21


07:33 


 





 


Glucose (Fingerstick)


 73 mg/dL


(70-99) 137 mg/dL


(70-99) 


 











Microbiology


4/12/21 Urine Culture - Final, Complete


          


4/12/21 Antimicrobic Susceptibility - Final, Complete


          


4/12/21 Blood Culture - Final, Complete


          


4/12/21 Antimicrobic Susceptibility - Final, Complete


Medications





Current Medications


Sodium Chloride 1,000 ml @  1,000 mls/hr Q1H IV  Last administered on 4/12/21at 

21:52;  Start 4/12/21 at 22:00;  Stop 4/12/21 at 22:59;  Status DC


Fentanyl Citrate (Fentanyl 2ml Vial) 50 mcg 1X  ONCE IVP  Last administered on 

4/12/21at 21:54;  Start 4/12/21 at 22:00;  Stop 4/12/21 at 22:01;  Status DC


Ondansetron HCl (Zofran) 4 mg 1X  ONCE IVP  Last administered on 4/12/21at 

21:54;  Start 4/12/21 at 22:00;  Stop 4/12/21 at 22:01;  Status DC


Acetaminophen (Tylenol) 500 mg 1X  ONCE PO  Last administered on 4/12/21at 

21:54;  Start 4/12/21 at 22:00;  Stop 4/12/21 at 22:01;  Status DC


Dexamethasone Sodium Phosphate (Decadron) 10 mg 1X  ONCE IVP  Last administered 

on 4/12/21at 21:55;  Start 4/12/21 at 22:00;  Stop 4/12/21 at 22:01;  Status DC


Ceftriaxone Sodium (Rocephin) 1 gm 1X  ONCE IVP ;  Start 4/12/21 at 22:30;  Stop

4/12/21 at 22:20;  Status DC


Levofloxacin/ Dextrose 150 ml @  100 mls/hr 1X  ONCE IV  Last administered on 

4/13/21at 00:22;  Start 4/12/21 at 23:00;  Stop 4/13/21 at 00:29;  Status DC


Piperacillin Sod/ Tazobactam Sod 4.5 gm/Sodium Chloride 100 ml @  200 mls/hr 1X 

ONCE IV  Last administered on 4/13/21at 00:15;  Start 4/12/21 at 23:00;  Stop 

4/12/21 at 23:29;  Status DC


Iohexol (Omnipaque 350 Mg/ml) 100 ml 1X  ONCE IV ;  Start 4/12/21 at 23:30;  

Stop 4/12/21 at 23:31;  Status DC


Info (CONTRAST GIVEN -- Rx MONITORING) 1 each PRN DAILY  PRN MC SEE COMMENTS;  

Start 4/12/21 at 23:15;  Stop 4/14/21 at 23:14;  Status DC


Morphine Sulfate (Morphine Sulfate) 4 mg 1X  ONCE IV  Last administered on 4/13 /21at 00:21;  Start 4/13/21 at 00:30;  Stop 4/13/21 at 00:31;  Status DC


Sodium Chloride 1,000 ml @  1,000 mls/hr 1X  ONCE IV  Last administered on 

4/13/21at 02:18;  Start 4/13/21 at 00:30;  Stop 4/13/21 at 01:29;  Status DC


Sodium Chloride 500 ml @  500 mls/hr 1X  ONCE IV  Last administered on 4/13/21at

03:20;  Start 4/13/21 at 00:30;  Stop 4/13/21 at 01:29;  Status DC


Ondansetron HCl (Zofran) 4 mg PRN Q8HRS  PRN IV NAUSEA/VOMITING 1ST CHOICE;  

Start 4/13/21 at 02:45;  Stop 4/14/21 at 02:44;  Status DC


Fentanyl Citrate (Fentanyl 2ml Vial) 50 mcg PRN Q2HRS  PRN IV SEVERE PAIN 7-10 

Last administered on 4/13/21at 03:21;  Start 4/13/21 at 02:45


Sodium Chloride 1,000 ml @  125 mls/hr Q8H IV  Last administered on 4/13/21at 

17:38;  Start 4/13/21 at 03:00;  Stop 4/14/21 at 02:59;  Status DC


Acetaminophen (Tylenol) 650 mg PRN Q4HRS  PRN PO FEVER > 100.3'F;  Start 4/13/21

at 02:45;  Stop 4/14/21 at 02:44;  Status DC


Insulin Human Lispro (HumaLOG) 0-5 UNITS TIDWMEALS SQ ;  Start 4/13/21 at 08:00;

 Stop 4/13/21 at 07:18;  Status DC


Dextrose (Dextrose 50%-Water Syringe) 12.5 gm PRN Q15MIN  PRN IV SEE COMMENTS;  

Start 4/13/21 at 02:45;  Status Cancel


Fluticasone/ Vilanterol (Breo Ellipta 100-25 Mcg) 1 puff DAILY INH  Last 

administered on 4/20/21at 08:14;  Start 4/13/21 at 09:00


Aspirin (Aspirin Chewable) 81 mg DAILY PO  Last administered on 4/20/21at 08:09;

 Start 4/13/21 at 09:00


Atorvastatin Calcium (Lipitor) 40 mg QHS PO  Last administered on 4/19/21at 

20:57;  Start 4/13/21 at 21:00


Fluticasone Propionate (Flonase) 2 spray DAILY NS  Last administered on 

4/20/21at 08:11;  Start 4/13/21 at 09:00


Losartan Potassium (Cozaar) 50 mg DAILY PO  Last administered on 4/20/21at 

08:18;  Start 4/13/21 at 09:00


Montelukast Sodium (Singulair) 10 mg QHS PO  Last administered on 4/19/21at 

20:57;  Start 4/13/21 at 21:00


Trazodone HCl (Desyrel) 100 mg PRN QHS  PRN PO INSOMNIA Last administered on 

4/16/21at 21:04;  Start 4/13/21 at 07:15


Insulin Glargine (Lantus Syringe) 30 unit BID SQ  Last administered on 4/19/21at

21:03;  Start 4/13/21 at 09:00


Insulin Human Lispro (HumaLOG) 30 units TIDWMEALS SQ  Last administered on 

4/20/21at 08:29;  Start 4/13/21 at 08:00


Dextrose (Dextrose 50%-Water Syringe) 12.5 gm PRN Q15MIN  PRN IV SEE COMMENTS 

Last administered on 4/20/21at 00:25;  Start 4/13/21 at 07:15


Dextrose (Iv Dextrose 5%) 250 ml PRN Q15MIN  PRN IV SEE COMMENTS;  Start 4/13/21

at 07:15;  Status Cancel


Insulin Human Lispro (HumaLOG) 0-9 UNITS TIDWMEALS SQ  Last administered on 

4/19/21at 09:35;  Start 4/13/21 at 08:00


Ondansetron HCl (Zofran) 4 mg PRN Q6HRS  PRN IVP NAUSEA/VOMITING Last 

administered on 4/18/21at 08:41;  Start 4/13/21 at 07:15


Al Hydroxide/Mg Hydroxide (Mylanta Plus Xs) 30 ml PRN Q3HRS  PRN PO HEARTBURN / 

GAS;  Start 4/13/21 at 07:15


Calcium Carbonate/ Glycine (Tums) 500 mg PRN Q3HRS  PRN PO UPSET STOMACH;  Start

4/13/21 at 07:15


Morphine Sulfate (Morphine Sulfate) 2 mg PRN Q1HR  PRN IV PAIN;  Start 4/13/21 

at 07:15


Acetaminophen/ Hydrocodone Bitart (Lortab 5/325) 1 tab PRN Q4HRS  PRN PO MILD 

PAIN 1-3;  Start 4/13/21 at 07:15


Acetaminophen/ Hydrocodone Bitart (Lortab 5/325) 2 tab PRN Q4HRS  PRN PO 

MODERATE PAIN, SEVERE PAIN Last administered on 4/20/21at 08:13;  Start 4/13/21 

at 07:15


Acetaminophen (Tylenol) 650 mg PRN Q6HRS  PRN PO Headaches, Temp > 101.5F;  

Start 4/13/21 at 07:15


Magnesium Hydroxide (Milk Of Magnesia) 2,400 mg PRN Q12HR  PRN PO CONSTIPATION; 

Start 4/13/21 at 07:15


Bisacodyl (Dulcolax Supp) 10 mg PRN DAILY  PRN [place] CONSTIPATION-2ND CHOICE;  

Start 4/13/21 at 07:15


Enoxaparin Sodium (Lovenox 40mg Syringe) 40 mg Q24H SQ ;  Start 4/13/21 at 

07:15;  Status UNV


Dexamethasone Sodium Phosphate (Decadron) 6 mg DAILY IVP  Last administered on 

4/15/21at 09:06;  Start 4/13/21 at 09:00;  Stop 4/15/21 at 13:05;  Status DC


Enoxaparin Sodium (Lovenox 60mg Syringe) 60 mg Q12HR SQ  Last administered on 

4/20/21at 08:13;  Start 4/13/21 at 09:00


Levofloxacin/ Dextrose 150 ml @  100 mls/hr Q24H IV ;  Start 4/13/21 at 21:00;  

Stop 4/13/21 at 10:00;  Status DC


Albuterol Sulfate (Ventolin Hfa) 1 puff PRN Q4HRS  PRN INH WHEEZING Last 

administered on 4/15/21at 09:05;  Start 4/13/21 at 09:30


Doxycycline Hyclate 100 mg/ Dextrose 100 ml @  50 mls/hr Q12HR IV ;  Start 4/13/ 21 at 11:00;  Stop 4/13/21 at 12:17;  Status DC


Ceftriaxone Sodium (Rocephin) 2 gm Q24H IVP ;  Start 4/13/21 at 11:00;  Stop 4/1

3/21 at 12:17;  Status DC


Piperacillin Sod/ Tazobactam Sod (Zosyn Per Pharmacy) 1 each PRN DAILY  PRN MC 

SEE COMMENTS;  Start 4/13/21 at 12:15;  Stop 4/16/21 at 13:50;  Status DC


Piperacillin Sod/ Tazobactam Sod 3.375 gm/Sodium Chloride 50 ml @  100 mls/hr 

Q6HRS IV  Last administered on 4/16/21at 11:51;  Start 4/13/21 at 13:00;  Stop 

4/16/21 at 12:04;  Status DC


Ceftriaxone Sodium (Rocephin) 2 gm Q24H IVP  Last administered on 4/17/21at 

13:00;  Start 4/16/21 at 13:00;  Stop 4/18/21 at 08:20;  Status DC


Lactobacillus Rhamnosus (Culturelle) 1 cap BID PO  Last administered on 

4/20/21at 08:09;  Start 4/16/21 at 21:00


Amoxicillin/ Clavulanate Potassium (Augmentin 875/ 125mg) 1 tab BID PO  Last 

administered on 4/20/21at 08:12;  Start 4/18/21 at 09:00


Hydralazine HCl (Apresoline Inj) 10 mg PRN Q4HRS  PRN IVP ELEVATED BP, SEE 

COMMENTS;  Start 4/18/21 at 12:00


Pantoprazole Sodium (Protonix) 40 mg DAILYAC PO  Last administered on 4/20/21at 

08:12;  Start 4/19/21 at 16:30





Active Scripts


Active


Norco 5-325 Tablet (Acetaminophen/Hydrocodone Bitart) 1 Each Tablet 1 Tab PO PRN

Q6HRS PRN 5 Days


Pantoprazole Sodium  ** (Pantoprazole Sodium) 40 Mg Tablet.dr 40 Mg PO DAILYAC 

30 Days


Montelukast Sodium Tablet  ** (Montelukast Sodium) 10 Mg Tablet 10 Mg PO QHS 30 

Days


Cozaar ** (Losartan Potassium) 50 Mg Tablet 50 Mg PO DAILY 30 Days


Combivent Respimat Inhal (Ipratropium/Albuterol Sulfate) 4 Gm Aer.w.adap 2 Inh 

IH QID 30 Days


Prednisone 20 Mg Tablet 1 Tab PO DAILY 5 Days


Doxycycline Hyclate 100 Mg Tablet 100 Mg PO BID 7 Days


Sertraline Hcl 100 Mg Tablet 100 Mg PO DAILY 30 Days


Trazodone Hcl 100 Mg Tablet 100 Mg PO PRN QHS PRN 30 Days


Lantus Solostar (Insulin Glargine,Hum.rec.anlog) 100 Unit/1 Ml Insuln.pen 30 

Unit SQ BID 30 Days


Reported


Novolog (Insulin Aspart) 100 Unit/1 Ml Cartridge 30 Unit SQ TIDWMEALS


Atorvastatin Calcium 40 Mg Tablet 40 Mg PO QHS


Fluticasone Propionate Nasal Spray (Fluticasone Propionate) 16 Gm Spray.susp 2 

Spr ASIM DAILY


Aspirin 81 Mg Tab.chew 1 Tab PO DAILY


Vitals/I & O





Vital Sign - Last 24 Hours








 4/19/21 4/19/21 4/19/21 4/19/21





 09:14 09:38 09:43 10:38


 


Pulse 86   


 


Resp  20  20


 


B/P (MAP) 135/68   


 


Pulse Ox  99  98


 


O2 Delivery  Nasal Cannula Nasal Cannula Nasal Cannula


 


O2 Flow Rate  3.0 3.0 





 4/19/21 4/19/21 4/19/21 4/19/21





 11:00 15:00 17:14 18:14


 


Temp 97.9 97.7  





 97.9 97.7  


 


Pulse 96 94  


 


Resp 20 20 20 20


 


B/P (MAP) 132/76 (94) 121/73 (89)  


 


Pulse Ox 98 99 98 98


 


O2 Delivery Room Air Room Air Nasal Cannula Nasal Cannula


 


O2 Flow Rate   3.0 3.0


 


    





    





 4/19/21 4/19/21 4/19/21 4/20/21





 19:00 20:00 22:38 03:00


 


Temp 97.9  97.8 97.8





 97.9  97.8 97.8


 


Pulse 101  101 104


 


Resp 19 19 19


 


B/P (MAP) 122/70 (87)  128/74 (92) 134/70 (91)


 


Pulse Ox 95  96 96


 


O2 Delivery Room Air Room Air Room Air Room Air


 


    





    





 4/20/21 4/20/21 4/20/21 





 07:00 08:13 08:18 


 


Temp 98.4   





 98.4   


 


Pulse 78  94 


 


Resp 18 20  


 


B/P (MAP) 152/78 (102)  152/78 


 


Pulse Ox 97 96  


 


O2 Delivery Nasal Cannula Room Air  


 


O2 Flow Rate 3.0   














Intake and Output   


 


 4/19/21 4/19/21 4/20/21





 15:00 23:00 07:00


 


Intake Total  400 ml 900 ml


 


Output Total 400 ml 300 ml 300 ml


 


Balance -400 ml 100 ml 600 ml











Justicifation of Admission Dx:


Justifications for Admission:


Justification of Admission Dx:  Yes











CHRISSIE GAN MD          Apr 20, 2021 08:36

## 2022-05-02 NOTE — PDOC2
GI CONSULT


Date of Service:


DATE: 4/19/21 


TIME: 14:54





Reason For Consult:


possible C Diff





HPI:


HPI:


41 y/o male admitted several days ago w/ SOA.  


Has bacteremia and UTI.  


He tells me he had some vomiting when he arrived but that diarrhea did not begin

until antibiotics were started after admission.  Tolerating diet now.


Having 4-5 loose stools daily - "uncontrollable."  Also has some right-sided 

pain that is worse w/ movement and was worse w/ urinating at one point.  No 

change w/ eating or stooling.





H/o GERD improved w/ Zantac in the past - currently untreated.  No dysphagia, 

hematemesis, constipation, hematochezia, melena, or weight changes.


No previous EGD or colonoscopy.


No GB, liver, pancreas, or PUD History.


On ASA.





PMH:


PMH:


HTN, asthma, DM, BC, OA, peripheral neuropathy


cataract removal





FH:


Family History:  Cancer (uncle - pancreatic, mother - ?colon)





Social History:


Smoke:  Quit


ALCOHOL:  none


Drugs:  None





ROS:





GEN: Denies fevers, chills, sweats


HEENT: Denies blurred vision, sore throat


CV: Denies chest pain


RESP: +SOA


GI: Per HPI


: Denies hematuria, dysuria


ENDO: Denies weight changes


NEURO: Denies confusion, dizziness


MSK: Denies weakness, joint pain/swelling


SKIN: Denies jaundice, pruritus





Vitals:


Vitals:





                                   Vital Signs








  Date Time  Temp Pulse Resp B/P (MAP) Pulse Ox O2 Delivery O2 Flow Rate FiO2


 


4/19/21 11:00 97.9 96 20 132/76 (94) 98 Room Air  





 97.9       


 


4/19/21 09:43       3.0 











Labs:


Labs:





Laboratory Tests








Test


 4/18/21


16:55 4/18/21


19:41 4/19/21


07:42 4/19/21


08:20


 


Glucose (Fingerstick)


 116 mg/dL


(70-99) 193 mg/dL


(70-99) 158 mg/dL


(70-99) 





 


White Blood Count


 


 


 


 14.0 x10^3/uL


(4.0-11.0)


 


Red Blood Count


 


 


 


 5.00 x10^6/uL


(4.30-5.70)


 


Hemoglobin


 


 


 


 12.2 g/dL


(13.0-17.5)


 


Hematocrit


 


 


 


 38.4 %


(39.0-53.0)


 


Mean Corpuscular Volume    77 fL () 


 


Mean Corpuscular Hemoglobin    24 pg (25-35) 


 


Mean Corpuscular Hemoglobin


Concent 


 


 


 32 g/dL


(31-37)


 


Red Cell Distribution Width


 


 


 


 15.3 %


(11.5-14.5)


 


Platelet Count


 


 


 


 444 x10^3/uL


(140-400)


 


Neutrophils (%) (Auto)    74 % (31-73) 


 


Lymphocytes (%) (Auto)    16 % (24-48) 


 


Monocytes (%) (Auto)    7 % (0-9) 


 


Eosinophils (%) (Auto)    2 % (0-3) 


 


Basophils (%) (Auto)    0 % (0-3) 


 


Neutrophils # (Auto)


 


 


 


 10.4 x10^3/uL


(1.8-7.7)


 


Lymphocytes # (Auto)


 


 


 


 2.2 x10^3/uL


(1.0-4.8)


 


Monocytes # (Auto)


 


 


 


 1.0 x10^3/uL


(0.0-1.1)


 


Eosinophils # (Auto)


 


 


 


 0.3 x10^3/uL


(0.0-0.7)


 


Basophils # (Auto)


 


 


 


 0.0 x10^3/uL


(0.0-0.2)


 


Sodium Level


 


 


 


 138 mmol/L


(136-145)


 


Potassium Level


 


 


 


 4.5 mmol/L


(3.5-5.1)


 


Chloride Level


 


 


 


 99 mmol/L


()


 


Carbon Dioxide Level


 


 


 


 30 mmol/L


(21-32)


 


Anion Gap    9 (6-14) 


 


Blood Urea Nitrogen


 


 


 


 11 mg/dL


(8-26)


 


Creatinine


 


 


 


 0.8 mg/dL


(0.7-1.3)


 


Estimated GFR


(Cockcroft-Gault) 


 


 


 128.3 





 


Glucose Level


 


 


 


 162 mg/dL


(70-99)


 


Calcium Level


 


 


 


 8.3 mg/dL


(8.5-10.1)


 


Test


 4/19/21


11:15 


 


 





 


Glucose (Fingerstick)


 105 mg/dL


(70-99) 


 


 














Allergies:


Coded Allergies:  


     Sulfa (Sulfonamide Antibiotics) (Verified  Allergy, Intermediate, 3/30/16)


     adhesive tape (Verified  Allergy, Intermediate, skin sensitive to tape, 

3/30/16)





Medications:


see emr





Imaging:


Imaging:


C/A/P CT


IMPRESSION:


1.  Markedly Limited evaluation secondary to body habitus and movement at time 

of contrast bolus timing. Examination is nondiagnostic for pulmonary embolus.


2.  Essentially a noncontrast CT of the chest was performed. No abnormalities 

are identified in the chest.


3.  Gallstones and within the gallbladder. Correlate with symptomatology.


4.  Subtle right perinephric stranding. Correlate with urinalysis for infection.





Abd US


IMPRESSION:  


Significantly limited evaluation as described.


Cholelithiasis without evidence of acute cholecystitis.


Hepatomegaly with findings suggestive of steatosis.





CXR


IMPRESSION: Stable diffuse interstitial prominence with suspected linear right 

middle lobe atelectasis. No consolidated infiltrate is seen.





PE:





GEN: NAD


HEENT: Atraumatic, PERRL


LUNGS: diminished anteriorly


HEART: RRR distant


ABD: morbidly obese, soft, non-tender, BS+


SKIN: No rashes, no jaundice


NEURO/PSYCH: A & O 3





A/P:


A/P:


A/C resp failure, bacteremia, UTI, diarrhea


Leukocytosis, microcytic anemia (elevated ALT and Alk Phos on 4/12 - not 

rechecked)


GERD


CRC screen, ?FH colon cancer - no previous colonoscopy


Cholelithiasis


Hepatomegaly, hepatic steatosis


COVID negative


BMI 66





--


Await C Diff.


Add PPI.


Check iron.


Recheck LFTs.











ODESSA SKELTON         Apr 19, 2021 15:06 Imaging Studies/Medications